# Patient Record
Sex: FEMALE | Race: WHITE | Employment: OTHER | URBAN - METROPOLITAN AREA
[De-identification: names, ages, dates, MRNs, and addresses within clinical notes are randomized per-mention and may not be internally consistent; named-entity substitution may affect disease eponyms.]

---

## 2018-04-25 ENCOUNTER — HOSPITAL ENCOUNTER (OUTPATIENT)
Dept: SURGERY | Age: 68
Discharge: HOME | End: 2018-04-25
Attending: SPECIALIST | Admitting: SPECIALIST

## 2018-04-25 VITALS
HEART RATE: 59 BPM | TEMPERATURE: 97.8 F | BODY MASS INDEX: 28.85 KG/M2 | DIASTOLIC BLOOD PRESSURE: 72 MMHG | OXYGEN SATURATION: 98 % | WEIGHT: 169 LBS | RESPIRATION RATE: 16 BRPM | SYSTOLIC BLOOD PRESSURE: 126 MMHG | HEIGHT: 64 IN

## 2018-04-25 RX ORDER — SODIUM CHLORIDE, SODIUM LACTATE, POTASSIUM CHLORIDE, CALCIUM CHLORIDE 600; 310; 30; 20 MG/100ML; MG/100ML; MG/100ML; MG/100ML
INJECTION, SOLUTION INTRAVENOUS CONTINUOUS
Status: DISCONTINUED | OUTPATIENT
Start: 2018-04-25 | End: 2018-04-26 | Stop reason: HOSPADM

## 2018-04-25 RX ADMIN — SODIUM CHLORIDE, SODIUM LACTATE, POTASSIUM CHLORIDE, CALCIUM CHLORIDE: 600; 310; 30; 20 INJECTION, SOLUTION INTRAVENOUS at 09:00

## 2018-04-25 ASSESSMENT — PAIN SCALES - GENERAL
PAINLEVEL_OUTOF10: 0
PAINLEVEL_OUTOF10: 0

## 2018-04-25 ASSESSMENT — PAIN - FUNCTIONAL ASSESSMENT: PAIN_FUNCTIONAL_ASSESSMENT: 0-10

## 2018-12-05 LAB
ALBUMIN SERPL-MCNC: 4.6 G/DL
ALP BLD-CCNC: 97 U/L
ALT SERPL-CCNC: 13 U/L
ANION GAP SERPL CALCULATED.3IONS-SCNC: NORMAL MMOL/L
AST SERPL-CCNC: 16 U/L
BILIRUB SERPL-MCNC: 0.4 MG/DL (ref 0.1–1.4)
BUN BLDV-MCNC: 16 MG/DL
CALCIUM SERPL-MCNC: 9.7 MG/DL
CHLORIDE BLD-SCNC: 104 MMOL/L
CHOLESTEROL, TOTAL: 176 MG/DL
CHOLESTEROL/HDL RATIO: 3.4
CO2: 31 MMOL/L
CREAT SERPL-MCNC: 1.1 MG/DL
GFR CALCULATED: NORMAL
GLUCOSE BLD-MCNC: 92 MG/DL
HDLC SERPL-MCNC: 52 MG/DL (ref 35–70)
LDL CHOLESTEROL CALCULATED: 84 MG/DL (ref 0–160)
POTASSIUM SERPL-SCNC: 4.4 MMOL/L
SODIUM BLD-SCNC: 144 MMOL/L
TOTAL PROTEIN: 6.8
TRIGL SERPL-MCNC: 202 MG/DL
VLDLC SERPL CALC-MCNC: 40 MG/DL

## 2019-05-10 DIAGNOSIS — F41.1 ANXIETY STATE: ICD-10-CM

## 2019-05-10 DIAGNOSIS — I10 BENIGN ESSENTIAL HTN: ICD-10-CM

## 2019-05-10 DIAGNOSIS — N63.0 BREAST LUMP: ICD-10-CM

## 2019-05-10 DIAGNOSIS — L71.9 ROSACEA: ICD-10-CM

## 2019-05-10 DIAGNOSIS — N18.2 CKD (CHRONIC KIDNEY DISEASE), STAGE II: ICD-10-CM

## 2019-06-04 ENCOUNTER — OFFICE VISIT (OUTPATIENT)
Dept: FAMILY MEDICINE CLINIC | Age: 69
End: 2019-06-04
Payer: MEDICARE

## 2019-06-04 VITALS
HEART RATE: 56 BPM | BODY MASS INDEX: 29.41 KG/M2 | WEIGHT: 166 LBS | SYSTOLIC BLOOD PRESSURE: 132 MMHG | DIASTOLIC BLOOD PRESSURE: 70 MMHG | HEIGHT: 63 IN

## 2019-06-04 DIAGNOSIS — I15.9 SECONDARY HYPERTENSION: Primary | ICD-10-CM

## 2019-06-04 DIAGNOSIS — E78.5 HYPERLIPIDEMIA, UNSPECIFIED HYPERLIPIDEMIA TYPE: ICD-10-CM

## 2019-06-04 DIAGNOSIS — I15.9 SECONDARY HYPERTENSION: ICD-10-CM

## 2019-06-04 DIAGNOSIS — K21.9 GASTROESOPHAGEAL REFLUX DISEASE WITHOUT ESOPHAGITIS: ICD-10-CM

## 2019-06-04 DIAGNOSIS — Z23 NEED FOR PROPHYLACTIC VACCINATION AGAINST STREPTOCOCCUS PNEUMONIAE (PNEUMOCOCCUS): ICD-10-CM

## 2019-06-04 DIAGNOSIS — M47.896 OTHER OSTEOARTHRITIS OF SPINE, LUMBAR REGION: ICD-10-CM

## 2019-06-04 PROBLEM — M47.899 OTHER OSTEOARTHRITIS OF SPINE: Status: ACTIVE | Noted: 2019-06-04

## 2019-06-04 LAB
A/G RATIO: 1.7 (ref 1.1–2.2)
ALBUMIN SERPL-MCNC: 4.5 G/DL (ref 3.4–5)
ALP BLD-CCNC: 113 U/L (ref 40–129)
ALT SERPL-CCNC: 15 U/L (ref 10–40)
ANION GAP SERPL CALCULATED.3IONS-SCNC: 14 MMOL/L (ref 3–16)
AST SERPL-CCNC: 21 U/L (ref 15–37)
BILIRUB SERPL-MCNC: 0.6 MG/DL (ref 0–1)
BUN BLDV-MCNC: 18 MG/DL (ref 7–20)
CALCIUM SERPL-MCNC: 9.5 MG/DL (ref 8.3–10.6)
CHLORIDE BLD-SCNC: 101 MMOL/L (ref 99–110)
CO2: 27 MMOL/L (ref 21–32)
CREAT SERPL-MCNC: 1.2 MG/DL (ref 0.6–1.2)
GFR AFRICAN AMERICAN: 54
GFR NON-AFRICAN AMERICAN: 44
GLOBULIN: 2.6 G/DL
GLUCOSE BLD-MCNC: 93 MG/DL (ref 70–99)
POTASSIUM SERPL-SCNC: 3.9 MMOL/L (ref 3.5–5.1)
SODIUM BLD-SCNC: 142 MMOL/L (ref 136–145)
TOTAL PROTEIN: 7.1 G/DL (ref 6.4–8.2)

## 2019-06-04 PROCEDURE — 99214 OFFICE O/P EST MOD 30 MIN: CPT | Performed by: FAMILY MEDICINE

## 2019-06-04 PROCEDURE — G0009 ADMIN PNEUMOCOCCAL VACCINE: HCPCS | Performed by: FAMILY MEDICINE

## 2019-06-04 PROCEDURE — 90732 PPSV23 VACC 2 YRS+ SUBQ/IM: CPT | Performed by: FAMILY MEDICINE

## 2019-06-04 RX ORDER — NABUMETONE 500 MG/1
500 TABLET, FILM COATED ORAL 2 TIMES DAILY
Qty: 180 TABLET | Refills: 1 | Status: SHIPPED | OUTPATIENT
Start: 2019-06-04 | End: 2019-12-04 | Stop reason: SDUPTHER

## 2019-06-04 RX ORDER — TRIAMTERENE AND HYDROCHLOROTHIAZIDE 37.5; 25 MG/1; MG/1
0.5 TABLET ORAL DAILY
Qty: 90 TABLET | Refills: 0 | Status: SHIPPED | OUTPATIENT
Start: 2019-06-04 | End: 2019-12-04 | Stop reason: SDUPTHER

## 2019-06-04 RX ORDER — AMLODIPINE BESYLATE 2.5 MG/1
2.5 TABLET ORAL DAILY
Qty: 90 TABLET | Refills: 1 | Status: SHIPPED | OUTPATIENT
Start: 2019-06-04 | End: 2019-12-04 | Stop reason: SDUPTHER

## 2019-06-04 RX ORDER — ATORVASTATIN CALCIUM 20 MG/1
20 TABLET, FILM COATED ORAL DAILY
Qty: 90 TABLET | Refills: 1 | Status: SHIPPED | OUTPATIENT
Start: 2019-06-04 | End: 2019-12-04 | Stop reason: SDUPTHER

## 2019-06-04 RX ORDER — ESOMEPRAZOLE MAGNESIUM 40 MG/1
40 FOR SUSPENSION ORAL DAILY
Qty: 90 PACKET | Refills: 1 | Status: SHIPPED | OUTPATIENT
Start: 2019-06-04 | End: 2019-06-11

## 2019-06-04 ASSESSMENT — ENCOUNTER SYMPTOMS
VOMITING: 0
EYE PAIN: 0
NAUSEA: 0
TROUBLE SWALLOWING: 0
ABDOMINAL PAIN: 0
CHEST TIGHTNESS: 0
BLOOD IN STOOL: 0
DIARRHEA: 0
WHEEZING: 0
SHORTNESS OF BREATH: 0

## 2019-06-04 ASSESSMENT — PATIENT HEALTH QUESTIONNAIRE - PHQ9
1. LITTLE INTEREST OR PLEASURE IN DOING THINGS: 0
SUM OF ALL RESPONSES TO PHQ QUESTIONS 1-9: 0
2. FEELING DOWN, DEPRESSED OR HOPELESS: 0
SUM OF ALL RESPONSES TO PHQ QUESTIONS 1-9: 0
SUM OF ALL RESPONSES TO PHQ9 QUESTIONS 1 & 2: 0

## 2019-06-04 NOTE — PROGRESS NOTES
6/4/2019    Isaiah Da Silva    Chief Complaint   Patient presents with    Hypertension    Hyperlipidemia    Gastroesophageal Reflux    6 Month Follow-Up     ROV, MED REFILLS, NO C/O       HPI  History was obtained from the patient. Justo Lowry is a 70 y.o. female who presents today with routine follow-up. She does continue to have mild to moderate osteoarthritis pain. Her GE reflux is well-controlled with treatment and I suggest she may try going to every other day on her PPI therapy. Meds. We will tolerated she remains active denies other issues or changes. She still takes care of her mother on a daily basis. Jacqueline Snider REVIEW OF SYMPTOMS    Review of Systems   Constitutional: Negative for activity change and fatigue. HENT: Negative for congestion, hearing loss, mouth sores and trouble swallowing. Eyes: Negative for pain and visual disturbance. Respiratory: Negative for chest tightness, shortness of breath and wheezing. Cardiovascular: Negative for chest pain and palpitations. Gastrointestinal: Negative for abdominal pain, blood in stool, diarrhea, nausea and vomiting. Genitourinary: Negative for dysuria, frequency and urgency. Musculoskeletal: Negative for arthralgias, gait problem and neck stiffness. Skin: Negative for rash. Allergic/Immunologic: Negative for environmental allergies. Neurological: Negative for dizziness, seizures, speech difficulty and weakness. Hematological: Does not bruise/bleed easily. Psychiatric/Behavioral: Negative for agitation, confusion and hallucinations.        PAST MEDICAL HISTORY  Past Medical History:   Diagnosis Date    Anxiety state     Arthritis     Benign essential HTN     Breast lump     Bunion of great toe of right foot     CKD (chronic kidney disease), stage II     Colon polyp 10-    GERD (gastroesophageal reflux disease)     Hyperlipidemia     Internal hemorrhoids 10-    Irritable bowel syndrome     Nausea & vomiting     Osteoarthritis     Rosacea        FAMILY HISTORY  Family History   Problem Relation Age of Onset    Other Son 22        CROHNS       SOCIAL HISTORY  Social History     Socioeconomic History    Marital status:      Spouse name: None    Number of children: None    Years of education: None    Highest education level: None   Occupational History    None   Social Needs    Financial resource strain: None    Food insecurity:     Worry: None     Inability: None    Transportation needs:     Medical: None     Non-medical: None   Tobacco Use    Smoking status: Never Smoker    Smokeless tobacco: Never Used   Substance and Sexual Activity    Alcohol use: No    Drug use: No    Sexual activity: None   Lifestyle    Physical activity:     Days per week: None     Minutes per session: None    Stress: None   Relationships    Social connections:     Talks on phone: None     Gets together: None     Attends Pentecostal service: None     Active member of club or organization: None     Attends meetings of clubs or organizations: None     Relationship status: None    Intimate partner violence:     Fear of current or ex partner: None     Emotionally abused: None     Physically abused: None     Forced sexual activity: None   Other Topics Concern    None   Social History Narrative    None        SURGICAL HISTORY  Past Surgical History:   Procedure Laterality Date    APPENDECTOMY  1963    BREAST SURGERY  2002    BIOPSY    BUNIONECTOMY Left    [address]    COLONOSCOPY  04/25/2018    FOOT SURGERY      Multiple Foot surgeries including pin in ankle and Heel Spurs    FOOT TENDON SURGERY      TUBAL LIGATION  1982    TUMOR REMOVAL      NECK X2                 CURRENT MEDICATIONS  Current Outpatient Medications   Medication Sig Dispense Refill    triamterene-hydrochlorothiazide (MAXZIDE-25) 37.5-25 MG per tablet Take 0.5 tablets by mouth daily 1/2 TABLET PER DAY 90 tablet 0    nabumetone (RELAFEN) 500 MG tablet Take 1 tablet by mouth 2 times daily 180 tablet 1    esomeprazole Magnesium (NEXIUM) 40 MG PACK Take 1 packet by mouth daily 90 packet 1    atorvastatin (LIPITOR) 20 MG tablet Take 1 tablet by mouth daily 90 tablet 1    amLODIPine (NORVASC) 2.5 MG tablet Take 1 tablet by mouth daily 90 tablet 1    Omega-3 Fatty Acids (FISH OIL) 1000 MG CAPS Take 3,000 mg by mouth 3 times daily      Calcium Carbonate-Vitamin D (CALCIUM 600+D HIGH POTENCY PO) Take by mouth      Multiple Vitamins-Minerals (HAIR SKIN AND NAILS FORMULA PO) Take by mouth      simethicone (MYLICON) 80 MG chewable tablet Take 80 mg by mouth every 6 hours as needed for Flatulence      Lactase (LACTAID PO) Take by mouth      hyoscyamine (LEVSIN/SL) 125 MCG sublingual tablet Place 1 tablet under the tongue 3 times daily (before meals) 270 tablet 3     No current facility-administered medications for this visit. ALLERGIES  No Known Allergies    PHYSICAL EXAM    /70 (Site: Right Upper Arm)   Pulse 56   Ht 5' 3\" (1.6 m)   Wt 166 lb (75.3 kg)   BMI 29.41 kg/m²     Physical Exam   Constitutional: She is oriented to person, place, and time. She appears well-developed and well-nourished. HENT:   Head: Normocephalic and atraumatic. Eyes: Pupils are equal, round, and reactive to light. EOM are normal.   Neck: Normal range of motion. Neck supple. Cardiovascular: Normal rate, regular rhythm and normal heart sounds. Pulmonary/Chest: Effort normal and breath sounds normal.   Abdominal: Soft. Musculoskeletal: Normal range of motion. Neurological: She is alert and oriented to person, place, and time. Skin: Skin is warm and dry. Psychiatric: She has a normal mood and affect. Thought content normal.   Nursing note and vitals reviewed. ASSESSMENT & PLAN     Diagnosis Orders   1. Secondary hypertension  Comprehensive Metabolic Panel   2.  Need for prophylactic vaccination against Streptococcus pneumoniae (pneumococcus)  Pneumococcal polysaccharide vaccine 23-valent PPSV23   3. Hyperlipidemia, unspecified hyperlipidemia type     4. Gastroesophageal reflux disease without esophagitis     5. Other osteoarthritis of spine, lumbar region     Continue with healthy lifestyle and current medications refill to be provided as needed we'll check a CMP today she is due for Pneumovax 23 which was given. Call if other problems arise    Return in about 6 months (around 12/4/2019).          Electronically signed by Vinay Vaughn MD on 6/4/2019

## 2019-06-11 ENCOUNTER — TELEPHONE (OUTPATIENT)
Dept: FAMILY MEDICINE CLINIC | Age: 69
End: 2019-06-11

## 2019-06-11 RX ORDER — ESOMEPRAZOLE MAGNESIUM 40 MG/1
40 CAPSULE, DELAYED RELEASE ORAL
Qty: 90 CAPSULE | Refills: 1 | Status: SHIPPED | OUTPATIENT
Start: 2019-06-11 | End: 2019-12-04 | Stop reason: SDUPTHER

## 2019-06-11 RX ORDER — ESOMEPRAZOLE MAGNESIUM 40 MG/1
40 CAPSULE, DELAYED RELEASE ORAL
COMMUNITY
End: 2019-06-11 | Stop reason: SDUPTHER

## 2019-06-11 NOTE — TELEPHONE ENCOUNTER
----- Message from Litzy Ortiz MA sent at 6/11/2019 10:19 AM EDT -----  Contact: PATIENT      ----- Message -----  From: Librado Brady  Sent: 6/11/2019   9:56 AM  To: Litzy Ortiz MA    JONATHAN IS IN A PACKAGE. SHE USUALLY GETS A CAP.  WANTS TO TALK TO NURSE.   [phone]

## 2019-08-26 ENCOUNTER — OFFICE VISIT (OUTPATIENT)
Dept: FAMILY MEDICINE CLINIC | Age: 69
End: 2019-08-26
Payer: MEDICARE

## 2019-08-26 VITALS
HEART RATE: 68 BPM | WEIGHT: 164.69 LBS | DIASTOLIC BLOOD PRESSURE: 62 MMHG | BODY MASS INDEX: 29.18 KG/M2 | HEIGHT: 63 IN | OXYGEN SATURATION: 96 % | SYSTOLIC BLOOD PRESSURE: 136 MMHG

## 2019-08-26 DIAGNOSIS — M10.9 ACUTE GOUT, UNSPECIFIED CAUSE, UNSPECIFIED SITE: Primary | ICD-10-CM

## 2019-08-26 PROCEDURE — 4040F PNEUMOC VAC/ADMIN/RCVD: CPT | Performed by: NURSE PRACTITIONER

## 2019-08-26 PROCEDURE — G8400 PT W/DXA NO RESULTS DOC: HCPCS | Performed by: NURSE PRACTITIONER

## 2019-08-26 PROCEDURE — 99213 OFFICE O/P EST LOW 20 MIN: CPT | Performed by: NURSE PRACTITIONER

## 2019-08-26 PROCEDURE — 1036F TOBACCO NON-USER: CPT | Performed by: NURSE PRACTITIONER

## 2019-08-26 PROCEDURE — 1090F PRES/ABSN URINE INCON ASSESS: CPT | Performed by: NURSE PRACTITIONER

## 2019-08-26 PROCEDURE — 1123F ACP DISCUSS/DSCN MKR DOCD: CPT | Performed by: NURSE PRACTITIONER

## 2019-08-26 PROCEDURE — 3017F COLORECTAL CA SCREEN DOC REV: CPT | Performed by: NURSE PRACTITIONER

## 2019-08-26 PROCEDURE — G8427 DOCREV CUR MEDS BY ELIG CLIN: HCPCS | Performed by: NURSE PRACTITIONER

## 2019-08-26 PROCEDURE — G8419 CALC BMI OUT NRM PARAM NOF/U: HCPCS | Performed by: NURSE PRACTITIONER

## 2019-08-26 RX ORDER — PREDNISONE 20 MG/1
40 TABLET ORAL DAILY
Qty: 10 TABLET | Refills: 0 | Status: SHIPPED | OUTPATIENT
Start: 2019-08-26 | End: 2019-08-31

## 2019-08-26 ASSESSMENT — ENCOUNTER SYMPTOMS
DIARRHEA: 0
VOMITING: 0
NAUSEA: 0
RESPIRATORY NEGATIVE: 1

## 2019-08-26 NOTE — PROGRESS NOTES
Results   Component Value Date     06/04/2019    K 3.9 06/04/2019     06/04/2019    CO2 27 06/04/2019    BUN 18 06/04/2019    CREATININE 1.2 06/04/2019    GLUCOSE 93 06/04/2019    CALCIUM 9.5 06/04/2019    PROT 7.1 06/04/2019    LABALBU 4.5 06/04/2019    BILITOT 0.6 06/04/2019    ALKPHOS 113 06/04/2019    AST 21 06/04/2019    ALT 15 06/04/2019    LABGLOM 44 (A) 06/04/2019    GFRAA 54 (A) 06/04/2019    AGRATIO 1.7 06/04/2019    GLOB 2.6 06/04/2019     Lab Results   Component Value Date    CHOL 176 12/05/2018     Lab Results   Component Value Date    TRIG 202 12/05/2018     Lab Results   Component Value Date    HDL 52 12/05/2018     Lab Results   Component Value Date    LDLCALC 84 12/05/2018     No results found for: LABA1C  No results found for: TSHFT4, TSH, TSHHS      ASSESSMENT/PLAN:      1. Acute gout, unspecified cause, unspecified site  Gout of DIP joint of left index finger. Advised patient if no improvement in the next 24 to 48 hours to call or follow up. - predniSONE (DELTASONE) 20 MG tablet; Take 2 tablets by mouth daily for 5 days  Dispense: 10 tablet; Refill: 0          There are no discontinued medications. No orders of the defined types were placed in this encounter. Care discussed with patient. Questions answered. Patient verbalizes understanding and agrees with plan. After visit summary provided. Advised to call for any problems, questions, or concerns. Return if symptoms worsen or fail to improve.                                              Signed:  KENYATTA Page CNP  08/26/19  4:05 PM

## 2019-10-22 ENCOUNTER — TELEPHONE (OUTPATIENT)
Dept: FAMILY MEDICINE CLINIC | Age: 69
End: 2019-10-22

## 2019-10-23 ENCOUNTER — TELEPHONE (OUTPATIENT)
Dept: FAMILY MEDICINE CLINIC | Age: 69
End: 2019-10-23

## 2019-12-04 ENCOUNTER — OFFICE VISIT (OUTPATIENT)
Dept: FAMILY MEDICINE CLINIC | Age: 69
End: 2019-12-04
Payer: MEDICARE

## 2019-12-04 VITALS
WEIGHT: 164 LBS | HEIGHT: 63 IN | SYSTOLIC BLOOD PRESSURE: 128 MMHG | BODY MASS INDEX: 29.06 KG/M2 | DIASTOLIC BLOOD PRESSURE: 74 MMHG | HEART RATE: 64 BPM

## 2019-12-04 DIAGNOSIS — I10 BENIGN ESSENTIAL HTN: ICD-10-CM

## 2019-12-04 DIAGNOSIS — I10 BENIGN ESSENTIAL HTN: Primary | ICD-10-CM

## 2019-12-04 DIAGNOSIS — E78.5 HYPERLIPIDEMIA, UNSPECIFIED HYPERLIPIDEMIA TYPE: ICD-10-CM

## 2019-12-04 DIAGNOSIS — K21.9 GASTROESOPHAGEAL REFLUX DISEASE WITHOUT ESOPHAGITIS: ICD-10-CM

## 2019-12-04 DIAGNOSIS — R30.0 DYSURIA: ICD-10-CM

## 2019-12-04 DIAGNOSIS — Z00.00 ROUTINE GENERAL MEDICAL EXAMINATION AT A HEALTH CARE FACILITY: ICD-10-CM

## 2019-12-04 LAB
A/G RATIO: 2 (ref 1.1–2.2)
ALBUMIN SERPL-MCNC: 4.4 G/DL (ref 3.4–5)
ALP BLD-CCNC: 105 U/L (ref 40–129)
ALT SERPL-CCNC: 15 U/L (ref 10–40)
ANION GAP SERPL CALCULATED.3IONS-SCNC: 14 MMOL/L (ref 3–16)
AST SERPL-CCNC: 21 U/L (ref 15–37)
BILIRUB SERPL-MCNC: 0.6 MG/DL (ref 0–1)
BUN BLDV-MCNC: 22 MG/DL (ref 7–20)
CALCIUM SERPL-MCNC: 9.4 MG/DL (ref 8.3–10.6)
CHLORIDE BLD-SCNC: 103 MMOL/L (ref 99–110)
CHOLESTEROL, TOTAL: 158 MG/DL (ref 0–199)
CO2: 25 MMOL/L (ref 21–32)
CREAT SERPL-MCNC: 1 MG/DL (ref 0.6–1.2)
EPITHELIAL CELLS, UA: 0 /HPF (ref 0–5)
GFR AFRICAN AMERICAN: >60
GFR NON-AFRICAN AMERICAN: 55
GLOBULIN: 2.2 G/DL
GLUCOSE BLD-MCNC: 89 MG/DL (ref 70–99)
HCT VFR BLD CALC: 37.4 % (ref 36–48)
HDLC SERPL-MCNC: 59 MG/DL (ref 40–60)
HEMOGLOBIN: 12.6 G/DL (ref 12–16)
HYALINE CASTS: 0 /LPF (ref 0–8)
LDL CHOLESTEROL CALCULATED: 68 MG/DL
MCH RBC QN AUTO: 31.4 PG (ref 26–34)
MCHC RBC AUTO-ENTMCNC: 33.7 G/DL (ref 31–36)
MCV RBC AUTO: 93.4 FL (ref 80–100)
PDW BLD-RTO: 13.9 % (ref 12.4–15.4)
PLATELET # BLD: 188 K/UL (ref 135–450)
PMV BLD AUTO: 9.1 FL (ref 5–10.5)
POTASSIUM SERPL-SCNC: 4.1 MMOL/L (ref 3.5–5.1)
RBC # BLD: 4.01 M/UL (ref 4–5.2)
RBC UA: 2 /HPF (ref 0–4)
SODIUM BLD-SCNC: 142 MMOL/L (ref 136–145)
TOTAL PROTEIN: 6.6 G/DL (ref 6.4–8.2)
TRIGL SERPL-MCNC: 156 MG/DL (ref 0–150)
TSH REFLEX: 2.82 UIU/ML (ref 0.27–4.2)
URINE TYPE: NORMAL
VLDLC SERPL CALC-MCNC: 31 MG/DL
WBC # BLD: 6.7 K/UL (ref 4–11)
WBC UA: 0 /HPF (ref 0–5)

## 2019-12-04 PROCEDURE — G0438 PPPS, INITIAL VISIT: HCPCS | Performed by: FAMILY MEDICINE

## 2019-12-04 RX ORDER — ESOMEPRAZOLE MAGNESIUM 40 MG/1
40 CAPSULE, DELAYED RELEASE ORAL
Qty: 90 CAPSULE | Refills: 1 | Status: ON HOLD | OUTPATIENT
Start: 2019-12-04 | End: 2020-07-01 | Stop reason: HOSPADM

## 2019-12-04 RX ORDER — NABUMETONE 500 MG/1
500 TABLET, FILM COATED ORAL 2 TIMES DAILY
Qty: 180 TABLET | Refills: 1 | Status: SHIPPED | OUTPATIENT
Start: 2019-12-04 | End: 2020-06-26 | Stop reason: SDUPTHER

## 2019-12-04 RX ORDER — ATORVASTATIN CALCIUM 20 MG/1
20 TABLET, FILM COATED ORAL DAILY
Qty: 90 TABLET | Refills: 1 | Status: SHIPPED | OUTPATIENT
Start: 2019-12-04 | End: 2020-06-26 | Stop reason: SDUPTHER

## 2019-12-04 RX ORDER — AMLODIPINE BESYLATE 2.5 MG/1
2.5 TABLET ORAL DAILY
Qty: 90 TABLET | Refills: 1 | Status: SHIPPED | OUTPATIENT
Start: 2019-12-04 | End: 2020-06-26 | Stop reason: SDUPTHER

## 2019-12-04 RX ORDER — SODIUM FLUORIDE 1.1 G/100G
1 GEL, DENTIFRICE ORAL NIGHTLY
Refills: 3 | COMMUNITY
Start: 2019-10-14

## 2019-12-04 RX ORDER — TRIAMTERENE AND HYDROCHLOROTHIAZIDE 37.5; 25 MG/1; MG/1
0.5 TABLET ORAL DAILY
Qty: 45 TABLET | Refills: 1 | Status: SHIPPED | OUTPATIENT
Start: 2019-12-04 | End: 2020-06-26 | Stop reason: SDUPTHER

## 2019-12-04 ASSESSMENT — PATIENT HEALTH QUESTIONNAIRE - PHQ9
SUM OF ALL RESPONSES TO PHQ QUESTIONS 1-9: 0
SUM OF ALL RESPONSES TO PHQ QUESTIONS 1-9: 0

## 2019-12-04 ASSESSMENT — LIFESTYLE VARIABLES
HAS A RELATIVE, FRIEND, DOCTOR, OR ANOTHER HEALTH PROFESSIONAL EXPRESSED CONCERN ABOUT YOUR DRINKING OR SUGGESTED YOU CUT DOWN: 0
HAVE YOU OR SOMEONE ELSE BEEN INJURED AS A RESULT OF YOUR DRINKING: 0
HOW OFTEN DURING THE LAST YEAR HAVE YOU FOUND THAT YOU WERE NOT ABLE TO STOP DRINKING ONCE YOU HAD STARTED: 0
HOW MANY STANDARD DRINKS CONTAINING ALCOHOL DO YOU HAVE ON A TYPICAL DAY: 0
HOW OFTEN DURING THE LAST YEAR HAVE YOU FAILED TO DO WHAT WAS NORMALLY EXPECTED FROM YOU BECAUSE OF DRINKING: 0
HOW OFTEN DURING THE LAST YEAR HAVE YOU NEEDED AN ALCOHOLIC DRINK FIRST THING IN THE MORNING TO GET YOURSELF GOING AFTER A NIGHT OF HEAVY DRINKING: 0
AUDIT-C TOTAL SCORE: 1
HOW OFTEN DO YOU HAVE SIX OR MORE DRINKS ON ONE OCCASION: 0
HOW OFTEN DO YOU HAVE A DRINK CONTAINING ALCOHOL: 1
HOW OFTEN DURING THE LAST YEAR HAVE YOU BEEN UNABLE TO REMEMBER WHAT HAPPENED THE NIGHT BEFORE BECAUSE YOU HAD BEEN DRINKING: 0
HOW OFTEN DURING THE LAST YEAR HAVE YOU HAD A FEELING OF GUILT OR REMORSE AFTER DRINKING: 0
AUDIT TOTAL SCORE: 1

## 2020-06-26 ENCOUNTER — VIRTUAL VISIT (OUTPATIENT)
Dept: FAMILY MEDICINE CLINIC | Age: 70
End: 2020-06-26
Payer: MEDICARE

## 2020-06-26 PROCEDURE — 1123F ACP DISCUSS/DSCN MKR DOCD: CPT | Performed by: FAMILY MEDICINE

## 2020-06-26 PROCEDURE — 3017F COLORECTAL CA SCREEN DOC REV: CPT | Performed by: FAMILY MEDICINE

## 2020-06-26 PROCEDURE — G8427 DOCREV CUR MEDS BY ELIG CLIN: HCPCS | Performed by: FAMILY MEDICINE

## 2020-06-26 PROCEDURE — 1036F TOBACCO NON-USER: CPT | Performed by: FAMILY MEDICINE

## 2020-06-26 PROCEDURE — 1090F PRES/ABSN URINE INCON ASSESS: CPT | Performed by: FAMILY MEDICINE

## 2020-06-26 PROCEDURE — 4040F PNEUMOC VAC/ADMIN/RCVD: CPT | Performed by: FAMILY MEDICINE

## 2020-06-26 PROCEDURE — 99213 OFFICE O/P EST LOW 20 MIN: CPT | Performed by: FAMILY MEDICINE

## 2020-06-26 PROCEDURE — G8417 CALC BMI ABV UP PARAM F/U: HCPCS | Performed by: FAMILY MEDICINE

## 2020-06-26 PROCEDURE — G8400 PT W/DXA NO RESULTS DOC: HCPCS | Performed by: FAMILY MEDICINE

## 2020-06-26 RX ORDER — ESOMEPRAZOLE MAGNESIUM 40 MG/1
40 CAPSULE, DELAYED RELEASE ORAL
Qty: 90 CAPSULE | Refills: 1 | Status: CANCELLED | OUTPATIENT
Start: 2020-06-26

## 2020-06-26 RX ORDER — AMLODIPINE BESYLATE 2.5 MG/1
2.5 TABLET ORAL DAILY
Qty: 90 TABLET | Refills: 1 | Status: ON HOLD | OUTPATIENT
Start: 2020-06-26 | End: 2020-07-01 | Stop reason: HOSPADM

## 2020-06-26 RX ORDER — TRIAMTERENE AND HYDROCHLOROTHIAZIDE 37.5; 25 MG/1; MG/1
0.5 TABLET ORAL DAILY
Qty: 45 TABLET | Refills: 1 | Status: SHIPPED | OUTPATIENT
Start: 2020-06-26 | End: 2021-01-05 | Stop reason: SDUPTHER

## 2020-06-26 RX ORDER — ATORVASTATIN CALCIUM 20 MG/1
20 TABLET, FILM COATED ORAL DAILY
Qty: 90 TABLET | Refills: 1 | Status: SHIPPED | OUTPATIENT
Start: 2020-06-26 | End: 2021-01-05 | Stop reason: SDUPTHER

## 2020-06-26 RX ORDER — NABUMETONE 500 MG/1
500 TABLET, FILM COATED ORAL 2 TIMES DAILY
Qty: 180 TABLET | Refills: 1 | Status: SHIPPED | OUTPATIENT
Start: 2020-06-26 | End: 2021-01-05 | Stop reason: SDUPTHER

## 2020-06-26 ASSESSMENT — PATIENT HEALTH QUESTIONNAIRE - PHQ9
SUM OF ALL RESPONSES TO PHQ QUESTIONS 1-9: 0
1. LITTLE INTEREST OR PLEASURE IN DOING THINGS: 0
SUM OF ALL RESPONSES TO PHQ QUESTIONS 1-9: 0
2. FEELING DOWN, DEPRESSED OR HOPELESS: 0
SUM OF ALL RESPONSES TO PHQ9 QUESTIONS 1 & 2: 0

## 2020-06-30 ENCOUNTER — HOSPITAL ENCOUNTER (OUTPATIENT)
Age: 70
Setting detail: OBSERVATION
Discharge: HOME | End: 2020-07-01
Attending: EMERGENCY MEDICINE | Admitting: INTERNAL MEDICINE
Payer: MEDICARE

## 2020-06-30 ENCOUNTER — APPOINTMENT (OUTPATIENT)
Dept: GENERAL RADIOLOGY | Age: 70
End: 2020-06-30
Payer: MEDICARE

## 2020-06-30 ENCOUNTER — APPOINTMENT (OUTPATIENT)
Dept: CT IMAGING | Age: 70
End: 2020-06-30
Payer: MEDICARE

## 2020-06-30 PROBLEM — R07.9 CHEST PAIN: Status: ACTIVE | Noted: 2020-06-30

## 2020-06-30 PROBLEM — R07.2 PRECORDIAL CHEST PAIN: Status: ACTIVE | Noted: 2020-06-30

## 2020-06-30 LAB
ALBUMIN SERPL-MCNC: 3.8 GM/DL (ref 3.4–5)
ALP BLD-CCNC: 95 IU/L (ref 40–129)
ALT SERPL-CCNC: 14 U/L (ref 10–40)
ANION GAP SERPL CALCULATED.3IONS-SCNC: 15 MMOL/L (ref 4–16)
AST SERPL-CCNC: 20 IU/L (ref 15–37)
BASOPHILS ABSOLUTE: 0 K/CU MM
BASOPHILS RELATIVE PERCENT: 0.3 % (ref 0–1)
BILIRUB SERPL-MCNC: 0.5 MG/DL (ref 0–1)
BUN BLDV-MCNC: 13 MG/DL (ref 6–23)
CALCIUM SERPL-MCNC: 9.3 MG/DL (ref 8.3–10.6)
CHLORIDE BLD-SCNC: 105 MMOL/L (ref 99–110)
CO2: 22 MMOL/L (ref 21–32)
CREAT SERPL-MCNC: 1.1 MG/DL (ref 0.6–1.1)
DIFFERENTIAL TYPE: ABNORMAL
EKG ATRIAL RATE: 64 BPM
EKG DIAGNOSIS: NORMAL
EKG P AXIS: 50 DEGREES
EKG P-R INTERVAL: 154 MS
EKG Q-T INTERVAL: 390 MS
EKG QRS DURATION: 84 MS
EKG QTC CALCULATION (BAZETT): 402 MS
EKG R AXIS: -29 DEGREES
EKG T AXIS: 10 DEGREES
EKG VENTRICULAR RATE: 64 BPM
EOSINOPHILS ABSOLUTE: 0.1 K/CU MM
EOSINOPHILS RELATIVE PERCENT: 1.2 % (ref 0–3)
GFR AFRICAN AMERICAN: 59 ML/MIN/1.73M2
GFR NON-AFRICAN AMERICAN: 49 ML/MIN/1.73M2
GLUCOSE BLD-MCNC: 106 MG/DL (ref 70–99)
HCT VFR BLD CALC: 36.7 % (ref 37–47)
HEMOGLOBIN: 11.9 GM/DL (ref 12.5–16)
IMMATURE NEUTROPHIL %: 0.3 % (ref 0–0.43)
LYMPHOCYTES ABSOLUTE: 1.5 K/CU MM
LYMPHOCYTES RELATIVE PERCENT: 22.1 % (ref 24–44)
MCH RBC QN AUTO: 30.5 PG (ref 27–31)
MCHC RBC AUTO-ENTMCNC: 32.4 % (ref 32–36)
MCV RBC AUTO: 94.1 FL (ref 78–100)
MONOCYTES ABSOLUTE: 0.5 K/CU MM
MONOCYTES RELATIVE PERCENT: 6.9 % (ref 0–4)
PDW BLD-RTO: 12.8 % (ref 11.7–14.9)
PLATELET # BLD: 186 K/CU MM (ref 140–440)
PMV BLD AUTO: 10.4 FL (ref 7.5–11.1)
POTASSIUM SERPL-SCNC: 3.9 MMOL/L (ref 3.5–5.1)
RBC # BLD: 3.9 M/CU MM (ref 4.2–5.4)
SEGMENTED NEUTROPHILS ABSOLUTE COUNT: 4.6 K/CU MM
SEGMENTED NEUTROPHILS RELATIVE PERCENT: 69.2 % (ref 36–66)
SODIUM BLD-SCNC: 142 MMOL/L (ref 135–145)
TOTAL IMMATURE NEUTOROPHIL: 0.02 K/CU MM
TOTAL PROTEIN: 6.5 GM/DL (ref 6.4–8.2)
TROPONIN T: <0.01 NG/ML
WBC # BLD: 6.6 K/CU MM (ref 4–10.5)

## 2020-06-30 PROCEDURE — 80053 COMPREHEN METABOLIC PANEL: CPT

## 2020-06-30 PROCEDURE — 6370000000 HC RX 637 (ALT 250 FOR IP): Performed by: EMERGENCY MEDICINE

## 2020-06-30 PROCEDURE — 85025 COMPLETE CBC W/AUTO DIFF WBC: CPT

## 2020-06-30 PROCEDURE — 36415 COLL VENOUS BLD VENIPUNCTURE: CPT

## 2020-06-30 PROCEDURE — 84484 ASSAY OF TROPONIN QUANT: CPT

## 2020-06-30 PROCEDURE — 93010 ELECTROCARDIOGRAM REPORT: CPT | Performed by: INTERNAL MEDICINE

## 2020-06-30 PROCEDURE — 71275 CT ANGIOGRAPHY CHEST: CPT

## 2020-06-30 PROCEDURE — 99204 OFFICE O/P NEW MOD 45 MIN: CPT | Performed by: INTERNAL MEDICINE

## 2020-06-30 PROCEDURE — G0378 HOSPITAL OBSERVATION PER HR: HCPCS

## 2020-06-30 PROCEDURE — 6360000004 HC RX CONTRAST MEDICATION: Performed by: EMERGENCY MEDICINE

## 2020-06-30 PROCEDURE — 99285 EMERGENCY DEPT VISIT HI MDM: CPT

## 2020-06-30 PROCEDURE — 71045 X-RAY EXAM CHEST 1 VIEW: CPT

## 2020-06-30 PROCEDURE — 93005 ELECTROCARDIOGRAM TRACING: CPT | Performed by: EMERGENCY MEDICINE

## 2020-06-30 PROCEDURE — 6370000000 HC RX 637 (ALT 250 FOR IP): Performed by: INTERNAL MEDICINE

## 2020-06-30 PROCEDURE — 2580000003 HC RX 258: Performed by: INTERNAL MEDICINE

## 2020-06-30 RX ORDER — SODIUM CHLORIDE 0.9 % (FLUSH) 0.9 %
10 SYRINGE (ML) INJECTION PRN
Status: DISCONTINUED | OUTPATIENT
Start: 2020-06-30 | End: 2020-07-01 | Stop reason: HOSPADM

## 2020-06-30 RX ORDER — TRIAMTERENE AND HYDROCHLOROTHIAZIDE 37.5; 25 MG/1; MG/1
0.5 TABLET ORAL DAILY
Status: DISCONTINUED | OUTPATIENT
Start: 2020-06-30 | End: 2020-07-01 | Stop reason: HOSPADM

## 2020-06-30 RX ORDER — SODIUM CHLORIDE 0.9 % (FLUSH) 0.9 %
10 SYRINGE (ML) INJECTION EVERY 12 HOURS SCHEDULED
Status: DISCONTINUED | OUTPATIENT
Start: 2020-06-30 | End: 2020-07-01 | Stop reason: HOSPADM

## 2020-06-30 RX ORDER — ONDANSETRON 2 MG/ML
4 INJECTION INTRAMUSCULAR; INTRAVENOUS EVERY 6 HOURS PRN
Status: DISCONTINUED | OUTPATIENT
Start: 2020-06-30 | End: 2020-07-01 | Stop reason: HOSPADM

## 2020-06-30 RX ORDER — ACETAMINOPHEN 650 MG/1
650 SUPPOSITORY RECTAL EVERY 6 HOURS PRN
Status: DISCONTINUED | OUTPATIENT
Start: 2020-06-30 | End: 2020-07-01 | Stop reason: HOSPADM

## 2020-06-30 RX ORDER — ASPIRIN 81 MG/1
81 TABLET ORAL DAILY
Status: DISCONTINUED | OUTPATIENT
Start: 2020-06-30 | End: 2020-07-01 | Stop reason: HOSPADM

## 2020-06-30 RX ORDER — ACETAMINOPHEN 325 MG/1
650 TABLET ORAL EVERY 6 HOURS PRN
Status: DISCONTINUED | OUTPATIENT
Start: 2020-06-30 | End: 2020-07-01 | Stop reason: HOSPADM

## 2020-06-30 RX ORDER — POLYETHYLENE GLYCOL 3350 17 G/17G
17 POWDER, FOR SOLUTION ORAL DAILY PRN
Status: DISCONTINUED | OUTPATIENT
Start: 2020-06-30 | End: 2020-07-01 | Stop reason: HOSPADM

## 2020-06-30 RX ORDER — PANTOPRAZOLE SODIUM 40 MG/1
40 TABLET, DELAYED RELEASE ORAL
Status: DISCONTINUED | OUTPATIENT
Start: 2020-07-01 | End: 2020-07-01 | Stop reason: HOSPADM

## 2020-06-30 RX ORDER — SIMETHICONE 80 MG
80 TABLET,CHEWABLE ORAL EVERY 6 HOURS PRN
Status: DISCONTINUED | OUTPATIENT
Start: 2020-06-30 | End: 2020-07-01 | Stop reason: HOSPADM

## 2020-06-30 RX ORDER — ATORVASTATIN CALCIUM 20 MG/1
20 TABLET, FILM COATED ORAL NIGHTLY
Status: DISCONTINUED | OUTPATIENT
Start: 2020-06-30 | End: 2020-07-01 | Stop reason: HOSPADM

## 2020-06-30 RX ORDER — PROMETHAZINE HYDROCHLORIDE 12.5 MG/1
12.5 TABLET ORAL EVERY 6 HOURS PRN
Status: DISCONTINUED | OUTPATIENT
Start: 2020-06-30 | End: 2020-07-01 | Stop reason: HOSPADM

## 2020-06-30 RX ORDER — NITROGLYCERIN 0.4 MG/1
0.4 TABLET SUBLINGUAL EVERY 5 MIN PRN
Status: DISCONTINUED | OUTPATIENT
Start: 2020-06-30 | End: 2020-07-01 | Stop reason: HOSPADM

## 2020-06-30 RX ORDER — ASPIRIN 81 MG/1
81 TABLET, CHEWABLE ORAL DAILY
Status: DISCONTINUED | OUTPATIENT
Start: 2020-06-30 | End: 2020-06-30 | Stop reason: SDUPTHER

## 2020-06-30 RX ORDER — ASPIRIN 81 MG/1
324 TABLET, CHEWABLE ORAL ONCE
Status: COMPLETED | OUTPATIENT
Start: 2020-06-30 | End: 2020-06-30

## 2020-06-30 RX ORDER — CARVEDILOL 3.12 MG/1
3.12 TABLET ORAL 2 TIMES DAILY WITH MEALS
Status: DISCONTINUED | OUTPATIENT
Start: 2020-06-30 | End: 2020-06-30

## 2020-06-30 RX ORDER — ISOSORBIDE MONONITRATE 30 MG/1
30 TABLET, EXTENDED RELEASE ORAL DAILY
Status: DISCONTINUED | OUTPATIENT
Start: 2020-06-30 | End: 2020-07-01 | Stop reason: HOSPADM

## 2020-06-30 RX ADMIN — ASPIRIN 81 MG 324 MG: 81 TABLET ORAL at 11:41

## 2020-06-30 RX ADMIN — SODIUM CHLORIDE, PRESERVATIVE FREE 10 ML: 5 INJECTION INTRAVENOUS at 20:50

## 2020-06-30 RX ADMIN — ATORVASTATIN CALCIUM 20 MG: 20 TABLET, FILM COATED ORAL at 20:50

## 2020-06-30 RX ADMIN — NITROGLYCERIN 0.4 MG: 0.4 TABLET SUBLINGUAL at 16:51

## 2020-06-30 RX ADMIN — IOPAMIDOL 75 ML: 755 INJECTION, SOLUTION INTRAVENOUS at 11:39

## 2020-06-30 RX ADMIN — ISOSORBIDE MONONITRATE 30 MG: 30 TABLET, EXTENDED RELEASE ORAL at 17:38

## 2020-06-30 RX ADMIN — NITROGLYCERIN 0.4 MG: 0.4 TABLET SUBLINGUAL at 16:46

## 2020-06-30 ASSESSMENT — PAIN DESCRIPTION - DESCRIPTORS
DESCRIPTORS: PRESSURE
DESCRIPTORS: HEAVINESS

## 2020-06-30 ASSESSMENT — PAIN DESCRIPTION - LOCATION
LOCATION: CHEST
LOCATION: CHEST

## 2020-06-30 ASSESSMENT — PAIN SCALES - GENERAL
PAINLEVEL_OUTOF10: 0
PAINLEVEL_OUTOF10: 4
PAINLEVEL_OUTOF10: 0
PAINLEVEL_OUTOF10: 0

## 2020-06-30 ASSESSMENT — PAIN DESCRIPTION - PAIN TYPE
TYPE: ACUTE PAIN
TYPE: ACUTE PAIN

## 2020-06-30 ASSESSMENT — PAIN DESCRIPTION - FREQUENCY
FREQUENCY: CONTINUOUS
FREQUENCY: CONTINUOUS

## 2020-06-30 NOTE — ED NOTES
Dr. Ryne Barnett called back, speaking with Dr. Aldo ELLIS, [facility]. Charles Mak.  [place], RN  06/30/20 8665

## 2020-06-30 NOTE — ED PROVIDER NOTES
46s and 62s, brother had stroke and MI in his 76s. son had MI in 45s other son stroke at young age   [facility] GERD (gastroesophageal reflux disease)     Hyperlipidemia     Internal hemorrhoids 10-    Irritable bowel syndrome     Nausea & vomiting     Osteoarthritis     Rosacea        CURRENT MEDICATIONS:   Home medications reviewed.     SURGICAL HISTORY:   Past Surgical History:   Procedure Laterality Date    APPENDECTOMY  1963    BREAST SURGERY  2002    BIOPSY    BUNIONECTOMY Left     CARDIAC CATHETERIZATION       SECTION      CHOLECYSTECTOMY      COLONOSCOPY  2018    FOOT SURGERY      Multiple Foot surgeries including pin in ankle and Heel Spurs    FOOT TENDON SURGERY      TUBAL LIGATION  1982    TUMOR REMOVAL      NECK X2       FAMILY HISTORY:   Family History   Problem Relation Age of Onset    Other Son 22        CROHNS       SOCIAL HISTORY:   Social History     Socioeconomic History    Marital status:      Spouse name: Not on file    Number of children: Not on file    Years of education: Not on file    Highest education level: Not on file   Occupational History    Not on file   Social Needs    Financial resource strain: Not on file    Food insecurity     Worry: Not on file     Inability: Not on file    Transportation needs     Medical: Not on file     Non-medical: Not on file   Tobacco Use    Smoking status: Never Smoker    Smokeless tobacco: Never Used   Substance and Sexual Activity    Alcohol use: No    Drug use: No    Sexual activity: Yes     Partners: Male   Lifestyle    Physical activity     Days per week: Not on file     Minutes per session: Not on file    Stress: Not on file   Relationships    Social connections     Talks on phone: Not on file     Gets together: Not on file     Attends Islam service: Not on file     Active member of club or organization: Not on file     Attends meetings of clubs or organizations: Not on file Relationship status: Not on file    Intimate partner violence     Fear of current or ex partner: Not on file     Emotionally abused: Not on file     Physically abused: Not on file     Forced sexual activity: Not on file   Other Topics Concern    Not on file   Social History Narrative    Not on file       ALLERGIES: Patient has no known allergies. PHYSICAL EXAM:  VITAL SIGNS:   ED Triage Vitals   Enc Vitals Group      BP       Pulse       Resp       Temp       Temp src       SpO2       Weight       Height       Head Circumference       Peak Flow       Pain Score       Pain Loc       Pain Edu? Excl. in [address]? Constitutional:  Non-toxic appearance  HENT: Normocephalic, Atraumatic, Bilateral external ears normal, Oropharynx moist, No oral exudates, Nose normal.  Eyes:  PERRL, Conjunctiva normal, No discharge. Neck: Normal range of motion, No tenderness, Supple, No stridor, No lymphadenopathy  Cardiovascular:  Normal heart rate, Normal rhythm  Pulmonary/Chest:  Normal breath sounds, No respiratory distress, No wheezing, no chest tenderness  Abdomen:   Bowel sounds normal, Soft, No tenderness, No masses, No pulsatile masses  Back:  No tenderness, No CVA tenderness  Extremities:  Normal range of motion, Intact distal pulses, No edema, No tenderness  Skin:  Warm, Dry, No erythema, No rash    EKG Interpretation  Interpreted by me  No prior to compare to  Rhythm: normal sinus  Rate: normal 64  Axis: normal  Ectopy: none  Conduction: normal  ST Segments: normal  T Waves: inverted in lead 3, otherwise normal  Clinical Impression: normal sinus rhythm, inverted t-waves in lead 3    Cardiac Monitor Strip Interpretation  Interpreted by me  Monitor strip interpreted for greater than 10 seconds  Rhythm: normal sinus  Rate: normal  Ectopy: none  ST Segments: normal      Radiology / Procedures:  CTA CHEST W CONTRAST (Final result)   Result time 06/30/20 11:54:04   Final result by Dieudonne Jauregui MD (06/30/20 11:54:04) Impression:    1. No evidence of aortic dissection or other acute process within the chest.  2. Air trapping is present, suggestive of small airways disease. Narrative:    EXAMINATION:  CTA OF THE CHEST 6/30/2020 11:16 am    TECHNIQUE:  CTA of the chest was performed after the administration of intravenous  contrast.  Multiplanar reformatted images are provided for review.  MIP  images are provided for review. Dose modulation, iterative reconstruction,  and/or weight based adjustment of the mA/kV was utilized to reduce the  radiation dose to as low as reasonably achievable. COMPARISON:  None. HISTORY:  ORDERING SYSTEM PROVIDED HISTORY: Chest pain radiating to back, evaluate for  aortic dissection  TECHNOLOGIST PROVIDED HISTORY:  Reason for exam:->Chest pain radiating to back, evaluate for aortic dissection  Reason for Exam: Chest pain radiating to back, evaluate for aortic dissection  Acuity: Acute  Type of Exam: Initial    FINDINGS:  Aorta: Motion artifact limits evaluation of the ascending aorta.  Mild aortic  atherosclerosis.  No dissection or aneurysmal dilation. Mediastinum: No evidence of mediastinal lymphadenopathy.  The heart and  pericardium demonstrate no acute abnormality. Lungs/Pleura: Air trapping is present on this expiratory phase exam.  No  focal consolidation or pulmonary edema.  No evidence of pleural effusion or  pneumothorax. Upper Abdomen: Status post cholecystectomy.  Possible hepatic steatosis. Soft Tissues/Bones: No acute bone or soft tissue abnormality.                    XR CHEST PORTABLE (Final result)   Result time 06/30/20 10:26:39   Final result by Dayna Quezada MD (06/30/20 10:26:39)                Impression:    No acute process. Narrative:    EXAMINATION:  ONE XRAY VIEW OF THE CHEST    6/30/2020 10:05 am    COMPARISON:  None.     HISTORY:  ORDERING SYSTEM PROVIDED HISTORY: chest pain  TECHNOLOGIST PROVIDED HISTORY:  Reason for exam:->chest pain    FINDINGS:  No focal consolidations.  No pleural effusion or pneumothorax.  Heart size is  within normal limits.  Cholecystectomy clips. Labs Reviewed   CBC WITH AUTO DIFFERENTIAL - Abnormal; Notable for the following components:       Result Value    RBC 3.90 (*)     Hemoglobin 11.9 (*)     Hematocrit 36.7 (*)     Segs Relative 69.2 (*)     Lymphocytes % 22.1 (*)     Monocytes % 6.9 (*)     All other components within normal limits   COMPREHENSIVE METABOLIC PANEL - Abnormal; Notable for the following components:    Glucose 106 (*)     GFR Non- 49 (*)     GFR  59 (*)     All other components within normal limits   TROPONIN       ED COURSE & MEDICAL DECISION MAKING:  Pertinent Labs & Imaging studies reviewed. (See chart for details)  On exam, the patient is afebrile and nontoxic appearing. She is hypertensive with a known history of hypertension and is asymptomatic. She is otherwise hemodynamically stable and neurologically intact. EKG shows a normal sinus rhythm with no ST elevation or depression. Labs are obtained and are significant for mild anemia with no other clinically significant lab abnormalities. Chest x-ray is negative. CTA of the chest is negative for aortic dissection or other acute process. Air trapping is present suggestive of small airway disease. The patient was treated with aspirin. The etiology of the patient's chest pain is unclear. I am unable to exclude cardiac etiology. Score is 4. I have a low suspicion for STEMI, thoracic aortic dissection, pulmonary embolism, pericardial effusion or pneumothorax. I recommended admission to the hospital and the patient was agreeable. I discussed the case with with the hospitalist who will admit the patient for further treatment and care. The patient is currently in stable condition awaiting admission. Clinical Impression:  1.  Chest pain, unspecified type        Disposition referral (if applicable):  Rajwinder Mohamud MD  [address] [place] [address]  [phone]            Disposition medications (if applicable):  Current Discharge Medication List            Comment: Please note this report has been produced using speech recognition software and may contain errors related to that system including errors in grammar, punctuation, and spelling, as well as words and phrases that may be inappropriate. If there are any questions or concerns please feel free to contact the dictating provider for clarification.         Ivonne Denise MD  06/30/20 9810

## 2020-06-30 NOTE — PROGRESS NOTES
Chitra Poole here to consult and is at patients bedside. Chitra Poole requesting giving patient nitroglycerin to see if the pressure goes away for patient. Patient c/o of chest pain at this time and is rating it a 4 out of 10 on pain scale and describing the pain as a \"heaviness\". Patient given nitro at this time per PRN EMAR.

## 2020-06-30 NOTE — PROGRESS NOTES
Admission skin assessment complete by this nurse and [facility].  No issues noted

## 2020-06-30 NOTE — CONSULTS
[facility]  1950      Referring physician:   Juana Hawkins MD     Primary care physician:  Becky Flores MD    Reason for consultation: Chest pain        History of present illness:  60-year-old female admitted for emergency room for further evaluation of chest pain. She reports chest discomfort to be pressure midsternal radiating in between the shoulder blade and to the right shoulder and to the throat on a scale of 1-10 it was 9 in severity at around 8:30 AM today. She denies any nausea vomiting or diaphoresis. She denies any belching or burping. She continues to have some residual pressure-like feeling on a scale of 1-10 it is 3 in severity. She was on Nexium until recently which has been taken off from her medications. She has no known cardiac history except history of cardiac murmur. She mentioned these symptoms to her . They were going for grocery shopping to \[facility]\"" and on their way to store they change their mind and he drove her to emergency room to be checked in [place]. She has hypertension and hyperlipidemia in addition to family history of coronary artery disease. She has never been a smoker. She does not exercise but stays active with chores and yard maintenance. She apparently had a stress test several years ago at [facility] for different types of symptoms. She never had a cardiac cath. REVIEW OF SYSTEMS:   Constitutional.  Denies any fevers chills or weight loss or weight gain. Eyes. . Negative for blood in the region  ENT. . Negative for recent runny nose  [facility] as in HPI  Respiratory. Negative for cough or shortness of breath  Gastroenterology. Has history of heartburns was on Nexium. Has history of colonic polyps  Neurologic. Negative for TIA CVA. Musculoskeletal.  Negative for back back problems. Has arthritis . Endocrine.   Negative for thyroid or suppository 650 mg  650 mg Rectal Q6H PRN Mickie Villa MD        polyethylene glycol (GLYCOLAX) packet 17 g  17 g Oral Daily PRN Mickie Villa MD        promethazine (PHENERGAN) tablet 12.5 mg  12.5 mg Oral Q6H PRN Mickie Villa MD        Or    ondansetron (ZOFRAN) injection 4 mg  4 mg Intravenous Q6H PRN Mickie iVlla MD        enoxaparin (LOVENOX) injection 40 mg  40 mg Subcutaneous Daily Mickie Villa MD        nitroGLYCERIN (NITROSTAT) SL tablet 0.4 mg  0.4 mg Sublingual Q5 Min PRN Mickie Villa MD   0.4 mg at 06/30/20 1651    aspirin EC tablet 81 mg  81 mg Oral Daily Mickie Villa MD        isosorbide mononitrate (IMDUR) extended release tablet 30 mg  30 mg Oral Daily Mickie Villa MD         Physical Examination:      Vitals:    06/30/20 1045 06/30/20 1100 06/30/20 1120 06/30/20 1356   BP: (!) 141/72 (!) 158/60 139/60 136/62   Pulse: 56 51 50 56   Resp: 12 10 16 16   Temp:    98.2 °F (36.8 °C)   TempSrc:    Oral   SpO2: 97% 100% 97% 98%   Weight:    166 lb 11.2 oz (75.6 kg)   Height:    5' 4\" (1.626 m)   Body mass index is 28.61 kg/m². Wt Readings from Last 3 Encounters:   06/30/20 166 lb 11.2 oz (75.6 kg)   12/04/19 164 lb (74.4 kg)   08/26/19 164 lb 11 oz (74.7 kg)     General appearance: Mildly overweight pleasant female no apparent distress. Eyes: Pupils are equal.  No conjunctival pallor noted. ENT: Unremarkable    NECK: No JVP or thyromegaly    Cardiovascular: Auscultation: Normal S1 and S2.  2/6 systolic ejection murmur noted in the aortic area and left sternal border. No bruits noted in both carotids. Abdominal aorta is nonpalpable. No epigastric bruit noted. Peripheral pulses: Pedal pulses are 2+ equal in both feet. Respiratory:  Respiratory effort is normal.  Breath sounds are clear to auscultation    Extremities:  No edema, clubbing,  Cyanosis, petechiae. SKIN: Warm and well perfused, no pallor or cyanosis    Abdomen:  No masses or tenderness.  No organomegaly noted. Musculoskeletal:  Negative for any obvious Spinal deformities Muscle strength is normal.    Neurologic:  Oriented to time, place, and person and non-anxious. No focal neurological deficit noted. Psychiatric: Judgment/Insight and Mood is normal    Lab Review   Recent Labs     06/30/20  1015   WBC 6.6   HGB 11.9*   HCT 36.7*         Recent Labs     06/30/20  1015      K 3.9      CO2 22   BUN 13   CREATININE 1.1     Recent Labs     06/30/20  1015   AST 20   ALT 14   BILITOT 0.5   ALKPHOS 95     Lab Results   Component Value Date    CHOL 158 12/04/2019    TRIG 156 (H) 12/04/2019    HDL 59 12/04/2019    LDLCALC 68 12/04/2019     Recent Labs     06/30/20  1000 06/30/20  1400   TROPONINT <0.010 <0.010     OTHER TEST RESULTS REVIEWED    EKG:  Normal sinus rhythm 64 bpm.    Moderate voltage criteria for LVH, may be normal variant   Borderline ECG   No previous ECGs available     Chest Xray:  Done today reported  FINDINGS:   No focal consolidations. No pleural effusion or pneumothorax. Heart size is   within normal limits. Cholecystectomy clips. Impression   No acute process. CTA chest done today reported  1. No evidence of aortic dissection or other acute process within the chest. 2. Air trapping is present, suggestive of small airways disease. Assessment and Recommendations: Active Hospital Problems    Diagnosis Date Noted    Precordial chest pain [R07.2] 06/30/2020    Family history of early CAD [Z82.49]     Benign essential HTN [I10]     Anxiety state [F41.1]     Hyperlipidemia [E78.5]        Will review echocardiogram to evaluate heart murmur and for any regional wall motion abnormality. Will try sublingual nitroglycerin to see if it helps the minimal discomfort she has. We'll start her on Imdur 30 mg a day. Her heart rate was in 50s earlier so she may not tolerate beta blocker therapy.   If she continues to have symptoms or has elevated troponins. Consider cardiac cath otherwise prefer to evaluate it noninvasively. Multiple questions are answered and she will bless understanding. Discussed with the nurse taking care of her. Discussed with Dr. Reena Hillman. Shade Nation MD, 6/30/2020 4:57 PM     Please note this report has been produced using speech recognition software and may contain errors related to that system including errors in grammar, punctuation, and spelling, as well as words and phrases that may be inappropriate.  If there are any questions or concerns please feel free to contact the dictating provider for clarification

## 2020-06-30 NOTE — H&P
History and Physical  Juno Cunningham MD    2020  Radha Peter  1950    PCP: Karley Elena MD    ASSESSMENT AND PLAN:      1. Typical chest pain, concerning for angina related to acs  - Cardiology consulted  - trend troponin  Repeat ekg in am, walk in am  - order covid for heart cath Friday; may be sooner if echo is abnormal tomorrow. if cp free in am or cp returns or has while here then to go to [facility]. 2. Bradycardia  - dc amlodipine and holding bb for now  - discussed with cardiology    3. htn  Can continue with maxzide     DVT prophy -  lovenox    Expected LOS  At least 24-48 hours    Cc: chest pain    HPI: Radha Peter is a 78 y.o. Came to ed for chest pressure. It started around 9 this morning and lasted 30 minutes started with intense right jaw pain and came into the teeth, then down into the throat. She had chest pain radiate across the front of her chest then go to the back. She was taken off nexium a week ago, and had a whopper and pizza yesterday, but does not feel this is consistent with heart burn. Last stress test was over ten years ago. Presently rated cp as 3-4/10 and was relieved with 2 sublingual nitroglycerine. PMHX:   Past Medical History:   Diagnosis Date    Anxiety state     Arthritis     Benign essential HTN     Breast lump     Bunion of great toe of right foot     CKD (chronic kidney disease), stage II     Colon polyp 10-    GERD (gastroesophageal reflux disease)     Hyperlipidemia     Internal hemorrhoids 10-    Irritable bowel syndrome     Nausea & vomiting     Osteoarthritis     Rosacea      PSHX:  has a past surgical history that includes  section (); Tubal ligation (); Appendectomy (); Breast surgery (); Cholecystectomy (); Cardiac catheterization (); tumor removal; Foot Tendon Surgery; Bunionectomy (Left); Foot surgery; and Colonoscopy (2018).     Meds - list of home medications reviewed in electronic chart and confirmed  Allergies: No Known Allergies    FAM HX: family history includes Other (age of onset: 22) in her son. Soc HX:   Social History     Socioeconomic History    Marital status:      Spouse name: None    Number of children: None    Years of education: None    Highest education level: None   Occupational History    None   Social Needs    Financial resource strain: None    Food insecurity     Worry: None     Inability: None    Transportation needs     Medical: None     Non-medical: None   Tobacco Use    Smoking status: Never Smoker    Smokeless tobacco: Never Used   Substance and Sexual Activity    Alcohol use: No    Drug use: No    Sexual activity: Yes     Partners: Male   Lifestyle    Physical activity     Days per week: None     Minutes per session: None    Stress: None   Relationships    Social connections     Talks on phone: None     Gets together: None     Attends [employer] service: None     Active member of club or organization: None     Attends meetings of clubs or organizations: None     Relationship status: None    Intimate partner violence     Fear of current or ex partner: None     Emotionally abused: None     Physically abused: None     Forced sexual activity: None   Other Topics Concern    None   Social History Narrative    None       ROS: a ten point ros with pertinent positives and negatives in HPI, otherwise negative    EXAM:  Blood pressure 139/60, pulse 50, temperature 98.4 °F (36.9 °C), temperature source Oral, resp. rate 16, height 5' 4\" (1.626 m), weight 167 lb (75.8 kg), SpO2 97 %, not currently breastfeeding.   Gen:  awake, alert, cooperative, no apparent distress   EYES:  Lids and lashes normal, pupils equal, round and reactive to light, extra ocular muscles intact, sclera clear, conjunctiva normal  ENT:  Normocephalic, oral pharynx with moist mucus membranes, tonsils without erythema or exudates,  NECK:  Supple, symmetrical, trachea midline, no adenopathy,  LUNGS:  Clear to auscultate bilaterally, no rales ronchi or wheezing noted. CARDIOVASCULAR:  regular rate and rhythm, normal S1 and S2,no murmur/gallop/rub  ABDOMEN: Normal BS, Non tender, non distended, no HSM noted. MUSCULOSKELETAL:  ROM of all extremities grossly wnl  NEUROLOGIC: AOx 3,  Cranial nerves II-XII are grossly intact. Motor is 5 out of 5 bilaterally. Sensory is intact  SKIN:  no bruising or bleeding, normal skin color, texture, turgor and no redness, warmth, or swelling        LABS in ER reviewed    Xr Chest Portable    Result Date: 6/30/2020  EXAMINATION: ONE XRAY VIEW OF THE CHEST 6/30/2020 10:05 am COMPARISON: None. HISTORY: ORDERING SYSTEM PROVIDED HISTORY: chest pain TECHNOLOGIST PROVIDED HISTORY: Reason for exam:->chest pain FINDINGS: No focal consolidations. No pleural effusion or pneumothorax. Heart size is within normal limits. Cholecystectomy clips. No acute process. Cta Chest W Contrast    Result Date: 6/30/2020  EXAMINATION: CTA OF THE CHEST 6/30/2020 11:16 am TECHNIQUE: CTA of the chest was performed after the administration of intravenous contrast.  Multiplanar reformatted images are provided for review. MIP images are provided for review. Dose modulation, iterative reconstruction, and/or weight based adjustment of the mA/kV was utilized to reduce the radiation dose to as low as reasonably achievable. COMPARISON: None. HISTORY: ORDERING SYSTEM PROVIDED HISTORY: Chest pain radiating to back, evaluate for aortic dissection TECHNOLOGIST PROVIDED HISTORY: Reason for exam:->Chest pain radiating to back, evaluate for aortic dissection Reason for Exam: Chest pain radiating to back, evaluate for aortic dissection Acuity: Acute Type of Exam: Initial FINDINGS: Aorta: Motion artifact limits evaluation of the ascending aorta. Mild aortic atherosclerosis. No dissection or aneurysmal dilation. Mediastinum: No evidence of mediastinal lymphadenopathy.   The heart and pericardium demonstrate no acute abnormality. Lungs/Pleura: Air trapping is present on this expiratory phase exam.  No focal consolidation or pulmonary edema. No evidence of pleural effusion or pneumothorax. Upper Abdomen: Status post cholecystectomy. Possible hepatic steatosis. Soft Tissues/Bones: No acute bone or soft tissue abnormality.      1. No evidence of aortic dissection or other acute process within the chest. 2. Air trapping is present, suggestive of small airways disease.   -  Patient Active Problem List   Diagnosis    Rectal pain    Diarrhea    IBS (irritable bowel syndrome)    Breast lump    Benign essential HTN    Colon polyp    Hyperlipidemia    CKD (chronic kidney disease), stage II    Anxiety state    Rosacea    Gastroesophageal reflux disease without esophagitis    Other osteoarthritis of spine    Precordial chest pain     ______________________________________________________________    Electronically signed by Bruce Webb MD on 6/30/2020 at 1:59 PM

## 2020-07-01 VITALS
WEIGHT: 166 LBS | RESPIRATION RATE: 16 BRPM | TEMPERATURE: 96.9 F | OXYGEN SATURATION: 97 % | BODY MASS INDEX: 28.34 KG/M2 | DIASTOLIC BLOOD PRESSURE: 55 MMHG | SYSTOLIC BLOOD PRESSURE: 111 MMHG | HEART RATE: 55 BPM | HEIGHT: 64 IN

## 2020-07-01 LAB
CHOLESTEROL: 147 MG/DL
EKG ATRIAL RATE: 59 BPM
EKG DIAGNOSIS: NORMAL
EKG P AXIS: 59 DEGREES
EKG P-R INTERVAL: 166 MS
EKG Q-T INTERVAL: 444 MS
EKG QRS DURATION: 74 MS
EKG QTC CALCULATION (BAZETT): 439 MS
EKG R AXIS: -30 DEGREES
EKG T AXIS: 13 DEGREES
EKG VENTRICULAR RATE: 59 BPM
HCT VFR BLD CALC: 34.4 % (ref 37–47)
HDLC SERPL-MCNC: 46 MG/DL
HEMOGLOBIN: 11.1 GM/DL (ref 12.5–16)
LDL CHOLESTEROL DIRECT: 77 MG/DL
LV EF: 58 %
LVEF MODALITY: NORMAL
MCH RBC QN AUTO: 30.9 PG (ref 27–31)
MCHC RBC AUTO-ENTMCNC: 32.3 % (ref 32–36)
MCV RBC AUTO: 95.8 FL (ref 78–100)
PDW BLD-RTO: 12.9 % (ref 11.7–14.9)
PLATELET # BLD: 171 K/CU MM (ref 140–440)
PMV BLD AUTO: 10.2 FL (ref 7.5–11.1)
RBC # BLD: 3.59 M/CU MM (ref 4.2–5.4)
SARS-COV-2, NAAT: NOT DETECTED
TRIGL SERPL-MCNC: 214 MG/DL
WBC # BLD: 7.5 K/CU MM (ref 4–10.5)

## 2020-07-01 PROCEDURE — 96372 THER/PROPH/DIAG INJ SC/IM: CPT

## 2020-07-01 PROCEDURE — 99226 PR SBSQ OBSERVATION CARE/DAY 35 MINUTES: CPT | Performed by: INTERNAL MEDICINE

## 2020-07-01 PROCEDURE — 6370000000 HC RX 637 (ALT 250 FOR IP): Performed by: INTERNAL MEDICINE

## 2020-07-01 PROCEDURE — G0378 HOSPITAL OBSERVATION PER HR: HCPCS

## 2020-07-01 PROCEDURE — 93010 ELECTROCARDIOGRAM REPORT: CPT | Performed by: INTERNAL MEDICINE

## 2020-07-01 PROCEDURE — 85027 COMPLETE CBC AUTOMATED: CPT

## 2020-07-01 PROCEDURE — 6360000002 HC RX W HCPCS: Performed by: INTERNAL MEDICINE

## 2020-07-01 PROCEDURE — 83721 ASSAY OF BLOOD LIPOPROTEIN: CPT

## 2020-07-01 PROCEDURE — U0002 COVID-19 LAB TEST NON-CDC: HCPCS

## 2020-07-01 PROCEDURE — 93306 TTE W/DOPPLER COMPLETE: CPT

## 2020-07-01 PROCEDURE — 2580000003 HC RX 258: Performed by: INTERNAL MEDICINE

## 2020-07-01 PROCEDURE — 80061 LIPID PANEL: CPT

## 2020-07-01 PROCEDURE — 36415 COLL VENOUS BLD VENIPUNCTURE: CPT

## 2020-07-01 PROCEDURE — 93005 ELECTROCARDIOGRAM TRACING: CPT | Performed by: INTERNAL MEDICINE

## 2020-07-01 RX ORDER — ASPIRIN 81 MG/1
81 TABLET ORAL DAILY
Qty: 30 TABLET | Refills: 3 | Status: SHIPPED | OUTPATIENT
Start: 2020-07-01 | End: 2020-07-21 | Stop reason: SDUPTHER

## 2020-07-01 RX ORDER — NITROGLYCERIN 0.4 MG/1
TABLET SUBLINGUAL
Qty: 25 TABLET | Refills: 3 | Status: SHIPPED | OUTPATIENT
Start: 2020-07-01 | End: 2021-09-29 | Stop reason: SDUPTHER

## 2020-07-01 RX ORDER — ISOSORBIDE MONONITRATE 30 MG/1
30 TABLET, EXTENDED RELEASE ORAL DAILY
Qty: 30 TABLET | Refills: 3 | Status: SHIPPED | OUTPATIENT
Start: 2020-07-01 | End: 2020-07-21 | Stop reason: SDUPTHER

## 2020-07-01 RX ADMIN — ASPIRIN 81 MG: 81 TABLET, COATED ORAL at 09:15

## 2020-07-01 RX ADMIN — PANTOPRAZOLE SODIUM 40 MG: 40 TABLET, DELAYED RELEASE ORAL at 06:12

## 2020-07-01 RX ADMIN — TRIAMTERENE AND HYDROCHLOROTHIAZIDE 0.5 TABLET: 37.5; 25 TABLET ORAL at 09:15

## 2020-07-01 RX ADMIN — ENOXAPARIN SODIUM 40 MG: 40 INJECTION SUBCUTANEOUS at 09:15

## 2020-07-01 RX ADMIN — SODIUM CHLORIDE, PRESERVATIVE FREE 10 ML: 5 INJECTION INTRAVENOUS at 09:16

## 2020-07-01 RX ADMIN — ISOSORBIDE MONONITRATE 30 MG: 30 TABLET, EXTENDED RELEASE ORAL at 09:15

## 2020-07-01 ASSESSMENT — PAIN SCALES - GENERAL: PAINLEVEL_OUTOF10: 0

## 2020-07-01 NOTE — PLAN OF CARE
Problem: Falls - Risk of:  Goal: Will remain free from falls  Description: Will remain free from falls  6/30/2020 2238 by Medina Teague RN  Outcome: Ongoing  6/30/2020 2236 by Medina Teague RN  Outcome: Ongoing  Goal: Absence of physical injury  Description: Absence of physical injury  6/30/2020 2238 by Medina Teague RN  Outcome: Ongoing  6/30/2020 2236 by Medina Teague RN  Outcome: Ongoing     Problem: Pain:  Goal: Pain level will decrease  Description: Pain level will decrease  6/30/2020 2238 by Medina Teague RN  Outcome: Ongoing  6/30/2020 2236 by Medina Teague RN  Outcome: Ongoing  Goal: Control of acute pain  Description: Control of acute pain  6/30/2020 2238 by Medina Teague RN  Outcome: Ongoing  6/30/2020 2236 by Medina Teague RN  Outcome: Ongoing  Goal: Control of chronic pain  Description: Control of chronic pain  6/30/2020 2238 by Medina Teague RN  Outcome: Ongoing  6/30/2020 2236 by Medina Teague RN  Outcome: Ongoing     Problem: Discharge Planning:  Goal: Discharged to appropriate level of care  Description: Discharged to appropriate level of care  6/30/2020 2238 by Medina Teague RN  Outcome: Ongoing  6/30/2020 2236 by Medina Teague RN  Outcome: Ongoing yes

## 2020-07-01 NOTE — DISCHARGE SUMMARY
isosorbide mononitrate (IMDUR) 30 MG extended release tablet  Take 1 tablet by mouth daily             Lactase (LACTAID PO)  Take by mouth             Multiple Vitamins-Minerals (HAIR SKIN AND NAILS FORMULA PO)  Take by mouth             nabumetone (RELAFEN) 500 MG tablet  Take 1 tablet by mouth 2 times daily             nitroGLYCERIN (NITROSTAT) 0.4 MG SL tablet  up to max of 3 total doses. If no relief after 1 dose, call 911. Omega-3 Fatty Acids (FISH OIL) 1000 MG CAPS  Take 3,000 mg by mouth 3 times daily             simethicone (MYLICON) 80 MG chewable tablet  Take 80 mg by mouth every 6 hours as needed for Flatulence             triamterene-hydroCHLOROthiazide (MAXZIDE-25) 37.5-25 MG per tablet  Take 0.5 tablets by mouth daily                  Code Status: Full Code     Consults: none and cardiology  IP CONSULT TO CARDIOLOGY    Diet: cardiac diet    Activity: activity as tolerated   Work:    Discharged Condition: fair    Prognosis: Fair    Disposition: home    Follow-up with   1. PCP within   5-7    Days  2. cardiologist in a couple of days       Discharge Physician Signed: Amanda Parsons M.D. The patient was seen and examined on day of discharge and this discharge summary is in conjunction with any daily progress note from day of discharge.   Time spent on discharge in the examination, evaluation, counseling and review of medications and discharge plan: 22 minutes

## 2020-07-01 NOTE — PLAN OF CARE
Problem: Falls - Risk of:  Goal: Will remain free from falls  7/1/2020 1054 by Danielito Khan RN  Outcome: Completed  6/30/2020 2238 by Silvana Morales RN  Outcome: Ongoing  6/30/2020 2236 by Silvana Morales RN  Outcome: Ongoing  Goal: Absence of physical injury  7/1/2020 1054 by Danielito Khan RN  Outcome: Completed  6/30/2020 2238 by Silvana Morales RN  Outcome: Ongoing  6/30/2020 2236 by Silvana Morales RN  Outcome: Ongoing     Problem: Pain:  Goal: Pain level will decrease  7/1/2020 1054 by Danielito Khan RN  Outcome: Completed  6/30/2020 2238 by Silvana Morales RN  Outcome: Ongoing  6/30/2020 2236 by Silvana Morales RN  Outcome: Ongoing  Goal: Control of acute pain  7/1/2020 1054 by Danielito Khan RN  Outcome: Completed  6/30/2020 2238 by Silvana Morales RN  Outcome: Ongoing  6/30/2020 2236 by Silvana Morales RN  Outcome: Ongoing  Goal: Control of chronic pain  7/1/2020 1054 by Danielito Khan RN  Outcome: Completed  6/30/2020 2238 by Silvana Morales RN  Outcome: Ongoing  6/30/2020 2236 by Silvana Morales RN  Outcome: Ongoing     Problem: Discharge Planning:  Goal: Discharged to appropriate level of care  7/1/2020 1054 by Danielito Khan RN  Outcome: Completed  6/30/2020 2238 by Silvana Morales RN  Outcome: Ongoing  6/30/2020 2236 by Silvana Morales RN  Outcome: Ongoing

## 2020-07-01 NOTE — PROGRESS NOTES
Cardiology Progress Note/ this is a video with    Radha Peter (78 y.o., female)  7/1/2020    Subjective:   Patient had complete relief of symptoms yesterday with nitroglycerin ×2 and had not had any recurrence. She had residual right and denied any motor symptoms. Her troponin is negative. Covid-19 is also negative. Allergies:  Patient has no known allergies. Scheduled Meds:   atorvastatin  20 mg Oral Nightly    pantoprazole  40 mg Oral QAM AC    triamterene-hydroCHLOROthiazide  0.5 tablet Oral Daily    sodium chloride flush  10 mL Intravenous 2 times per day    enoxaparin  40 mg Subcutaneous Daily    aspirin  81 mg Oral Daily    isosorbide mononitrate  30 mg Oral Daily       Objective:      Physical Exam:  Vitals:    06/30/20 1826   BP: (!) 117/56   Pulse: 63   Resp: 16   Temp: 97.2 °F (36.2 °C)   SpO2: 97%      General: AAO, NAD  Follows commands and asks relevant questions. She is not anxious when she is comfortable. Lab Review   Recent Labs     07/01/20  0523   WBC 7.5   HGB 11.1*   HCT 34.4*         Recent Labs     06/30/20  1015      K 3.9      CO2 22   BUN 13   CREATININE 1.1     Recent Labs     06/30/20  1015   AST 20   ALT 14   BILITOT 0.5   ALKPHOS 95     Lab Results   Component Value Date    CHOL 147 07/01/2020    TRIG 214 (H) 07/01/2020    HDL 46 07/01/2020    LDLCALC 68 12/04/2019    LDLDIRECT 77 07/01/2020     Recent Labs     06/30/20  1000 06/30/20  1400 06/30/20  1700   TROPONINT <0.010 <0.010 <0.010     EKG/Rhytyhm review:      Assessment:     Active Problems:    Benign essential HTN    Hyperlipidemia    Anxiety state    Chest pain    Family history of early CAD  Resolved Problems:    * No resolved hospital problems. *      Plan: Will ambulate her in the hallways. We'll follow up on echocardiogram.  If she is symptom free she could be discharged for outpatient follow-up for stress Cardiolite for further evaluation.   If she has recurrent symptoms requiring nitroglycerin will consider aggressive invasive evaluation on urgent and emergent basis. Details are discussed with her and she verbalized understanding and asked relevant questions.   Discussed with Dr. Alma Rosa Lopez MD, 7/1/2020 8:49 AM

## 2020-07-02 ENCOUNTER — TELEPHONE (OUTPATIENT)
Dept: CARDIOLOGY CLINIC | Age: 70
End: 2020-07-02

## 2020-07-07 ENCOUNTER — PROCEDURE VISIT (OUTPATIENT)
Dept: CARDIOLOGY CLINIC | Age: 70
End: 2020-07-07
Payer: MEDICARE

## 2020-07-07 LAB
LV EF: 71 %
LVEF MODALITY: NORMAL

## 2020-07-07 PROCEDURE — 93015 CV STRESS TEST SUPVJ I&R: CPT | Performed by: INTERNAL MEDICINE

## 2020-07-07 PROCEDURE — A9500 TC99M SESTAMIBI: HCPCS | Performed by: INTERNAL MEDICINE

## 2020-07-07 PROCEDURE — 78452 HT MUSCLE IMAGE SPECT MULT: CPT | Performed by: INTERNAL MEDICINE

## 2020-07-09 ENCOUNTER — TELEPHONE (OUTPATIENT)
Dept: CARDIOLOGY CLINIC | Age: 70
End: 2020-07-09

## 2020-07-09 NOTE — TELEPHONE ENCOUNTER
Normal Study negative for ischemia. Normal LV size and systolic function.    Ejection fraction is 71 %.    Hypertension with hypertensive blood pressure response to exercise is noted.    Exercise tolerance is fair for age, patient exercised for 3 minutes on    standard Matthew protocol.        Recommendation    Continue lifestyle and risk modification for primary prevention. Pt notified of Stress test results; pt voiced understanding.

## 2020-07-15 ENCOUNTER — TELEPHONE (OUTPATIENT)
Dept: FAMILY MEDICINE CLINIC | Age: 70
End: 2020-07-15

## 2020-07-21 ENCOUNTER — OFFICE VISIT (OUTPATIENT)
Dept: CARDIOLOGY CLINIC | Age: 70
End: 2020-07-21
Payer: MEDICARE

## 2020-07-21 VITALS
HEIGHT: 64 IN | SYSTOLIC BLOOD PRESSURE: 154 MMHG | TEMPERATURE: 97.3 F | DIASTOLIC BLOOD PRESSURE: 72 MMHG | HEART RATE: 72 BPM | BODY MASS INDEX: 28.51 KG/M2 | WEIGHT: 167 LBS | RESPIRATION RATE: 16 BRPM

## 2020-07-21 PROCEDURE — 1090F PRES/ABSN URINE INCON ASSESS: CPT | Performed by: INTERNAL MEDICINE

## 2020-07-21 PROCEDURE — 3017F COLORECTAL CA SCREEN DOC REV: CPT | Performed by: INTERNAL MEDICINE

## 2020-07-21 PROCEDURE — 99214 OFFICE O/P EST MOD 30 MIN: CPT | Performed by: INTERNAL MEDICINE

## 2020-07-21 PROCEDURE — 1036F TOBACCO NON-USER: CPT | Performed by: INTERNAL MEDICINE

## 2020-07-21 PROCEDURE — G8417 CALC BMI ABV UP PARAM F/U: HCPCS | Performed by: INTERNAL MEDICINE

## 2020-07-21 PROCEDURE — G8400 PT W/DXA NO RESULTS DOC: HCPCS | Performed by: INTERNAL MEDICINE

## 2020-07-21 PROCEDURE — 1123F ACP DISCUSS/DSCN MKR DOCD: CPT | Performed by: INTERNAL MEDICINE

## 2020-07-21 PROCEDURE — G8427 DOCREV CUR MEDS BY ELIG CLIN: HCPCS | Performed by: INTERNAL MEDICINE

## 2020-07-21 PROCEDURE — 4040F PNEUMOC VAC/ADMIN/RCVD: CPT | Performed by: INTERNAL MEDICINE

## 2020-07-21 RX ORDER — ASPIRIN 81 MG/1
81 TABLET ORAL DAILY
Qty: 90 TABLET | Refills: 3 | Status: SHIPPED | OUTPATIENT
Start: 2020-07-21

## 2020-07-21 RX ORDER — ISOSORBIDE MONONITRATE 30 MG/1
30 TABLET, EXTENDED RELEASE ORAL DAILY
Qty: 90 TABLET | Refills: 3 | Status: SHIPPED | OUTPATIENT
Start: 2020-07-21 | End: 2021-01-26 | Stop reason: SDUPTHER

## 2020-07-21 NOTE — ASSESSMENT & PLAN NOTE
Has component of whitecoat syndrome otherwise well controlled. She is counseled to start monitoring blood pressure regularly at home and bring the results. Goal is to keep it below 140/90. Weight management regularly exercises and salt restriction counseled.

## 2020-07-21 NOTE — ASSESSMENT & PLAN NOTE
Counseled for stress management and regular exercises. Test results are discussed. Questions answered.

## 2020-07-21 NOTE — ASSESSMENT & PLAN NOTE
Had not had much recurrence since last visit in the hospital.  Continue aggressive risk modification for primary prevention and carry nitroglycerin for when necessary usage and call us if she has recurrence of symptoms. Had negative stress test for ischemia.

## 2020-07-21 NOTE — PROGRESS NOTES
Jacob Clark  1950  Lesa Killian MD    Chief complaint and HPI:  Jacob Clark  is a 78 y.o. female following up with chest tightness which led her to be admitted to the hospital.  She hadn't had much discomfort since then. She does not exercise regularly. She is taking her medications regularly. She usually has a tendency to run high blood pressure when she is in doctor's office otherwise her blood pressure at home has been 634 to 190 systolic which she monitors sporadically. Her medications are reviewed. She does not smoke. Rest of the Cardiovascular system review is otherwise unchanged from prior encounter. Past medical history:  has a past medical history of Anxiety state, Arthritis, Benign essential HTN, Breast lump, Bunion of great toe of right foot, CKD (chronic kidney disease), stage II, Colon polyp, Family history of early CAD, GERD (gastroesophageal reflux disease), History of nuclear stress test, Hyperlipidemia, Internal hemorrhoids, Irritable bowel syndrome, Nausea & vomiting, Osteoarthritis, and Rosacea. Past surgical history:  has a past surgical history that includes  section (); Tubal ligation (); Appendectomy (); Breast surgery (); Cholecystectomy (); Cardiac catheterization (); tumor removal; Foot Tendon Surgery; Bunionectomy (Left); Foot surgery; Colonoscopy (2018); and Diagnostic Cardiac Cath Lab Procedure. Social History:   Social History     Tobacco Use    Smoking status: Never Smoker    Smokeless tobacco: Never Used   Substance Use Topics    Alcohol use: No     Family history: family history includes Other (age of onset: 22) in her son. ALLERGIES:  Patient has no known allergies. Prior to Admission medications    Medication Sig Start Date End Date Taking?  Authorizing Provider   isosorbide mononitrate (IMDUR) 30 MG extended release tablet Take 1 tablet by mouth daily 20  Yes Kj Chan MD   aspirin 81 MG EC tablet Take 1 tablet by mouth daily 7/21/20  Yes Radha Ware MD   nitroGLYCERIN (NITROSTAT) 0.4 MG SL tablet up to max of 3 total doses. If no relief after 1 dose, call 911. 7/1/20  Yes Katarzyna Strange MD   atorvastatin (LIPITOR) 20 MG tablet Take 1 tablet by mouth daily 6/26/20  Yes Michaela Lew MD   nabumetone (RELAFEN) 500 MG tablet Take 1 tablet by mouth 2 times daily 6/26/20  Yes Michaela Lew MD   triamterene-hydroCHLOROthiazide [facility]) 37.5-25 MG per tablet Take 0.5 tablets by mouth daily 6/26/20  Yes Michaela Lew MD   DENTA 5000 PLUS 1.1 % CREA Take 1 Dose by mouth nightly 10/14/19  Yes Historical Provider, MD   Omega-3 Fatty Acids (FISH OIL) 1000 MG CAPS Take 3,000 mg by mouth 3 times daily   Yes Historical Provider, MD   Calcium Carbonate-Vitamin D (CALCIUM 600+D HIGH POTENCY PO) Take by mouth   Yes Historical Provider, MD   Multiple Vitamins-Minerals (HAIR SKIN AND NAILS FORMULA PO) Take by mouth   Yes Historical Provider, MD   simethicone (MYLICON) 80 MG chewable tablet Take 80 mg by mouth every 6 hours as needed for Flatulence   Yes Historical Provider, MD   Lactase (LACTAID PO) Take by mouth   Yes Historical Provider, MD     Vitals:    07/21/20 0904 07/21/20 0915   BP: (!) 164/80 (!) 154/72   Site: Left Upper Arm    Position: Sitting    Cuff Size: Medium Adult    Pulse: 72    Resp: 16    Temp: 97.3 °F (36.3 °C)    Weight: 167 lb (75.8 kg)    Height: 5' 4\" (1.626 m)       Body mass index is 28.67 kg/m². Wt Readings from Last 3 Encounters:   07/21/20 167 lb (75.8 kg)   07/01/20 166 lb (75.3 kg)   12/04/19 164 lb (74.4 kg)     Cardiovascular: Auscultation: Normal S1 and S2.  2/6 systolic ejection murmur noted in the aortic area and left sternal border. No bruits noted in both carotids. Abdominal aorta is nonpalpable. No epigastric bruit noted.  Peripheral pulses: Pedal pulses are 2+ equal in both feet.     Respiratory:  Respiratory effort is normal.  Breath sounds are clear to auscultation     Extremities:  No edema, clubbing,  Cyanosis, petechiae.      SKIN: Warm and well perfused, no pallor or cyanosis     Abdomen:  No masses or tenderness. No organomegaly noted.     Musculoskeletal:  Negative for any obvious Spinal deformities Muscle strength is normal.     Neurologic:  Oriented to time, place, and person and non-anxious. No focal neurological deficit noted.     Psychiatric: Judgment/Insight and Mood is normal     Normal left ventricle structure and function. EF is 55-60 % . Impaired relaxation compatible with diastolic dysfunction. Mild tricuspid regurgitation and trace pulmonic regurgitation present. Right ventricular systolic pressure of 38 mm Hg . No evidence of pericardial effusion. Normal Study negative for ischemia. Normal LV size and systolic function. Ejection fraction is 71 %. Hypertension with hypertensive blood pressure response to exercise is noted. Exercise tolerance is fair for age, patient exercised for 3 minutes on  standard Matthew protocol. LAB REVIEW:  CBC:   Lab Results   Component Value Date    WBC 7.5 07/01/2020    HGB 11.1 07/01/2020    HCT 34.4 07/01/2020     07/01/2020     Lipids:   Lab Results   Component Value Date    CHOL 147 07/01/2020    TRIG 214 (H) 07/01/2020    HDL 46 07/01/2020    LDLCALC 68 12/04/2019    LDLDIRECT 77 07/01/2020     Renal:   Lab Results   Component Value Date    BUN 13 06/30/2020    CREATININE 1.1 06/30/2020     06/30/2020    K 3.9 06/30/2020     IMPRESSION and RECOMMENDATIONS:      Benign essential HTN  Has component of whitecoat syndrome otherwise well controlled. She is counseled to start monitoring blood pressure regularly at home and bring the results. Goal is to keep it below 140/90. Weight management regularly exercises and salt restriction counseled. Hyperlipidemia  Controlled on atorvastatin 20 mg daily. Continue the same.     Anxiety state  Counseled for stress management and regular exercises. Test results are discussed. Questions answered. Chest pain  Had not had much recurrence since last visit in the hospital.  Continue aggressive risk modification for primary prevention and carry nitroglycerin for when necessary usage and call us if she has recurrence of symptoms. Had negative stress test for ischemia. Primary prevention is the goal by aggressive risk modification, healthy and therapeutic life style changes for cardiovascular risk reduction. Various goals are discussed and questions answered. Counseled for regular brisk walking for exercise for 20-30 minutes and weight management. Continue current cardiovascular medications which have been reviewed and discussed individually with you. Appropriate prescriptions if needed on this visit are addressed. After visit summery is provided. Questions answered and patient verbalizes understanding. Follow up in 6 months,  sooner if needed. Patient is fully trained in using the smart phone they have for virtual video office visit if needed in near future. [employer] verbalized understanding the technology and consents to use it if needed. Multiple questions answered. Patient appreciated this time spent in training. We have discussed Coronavirus precaution including handwashing practice, wiping items touched in public such as gas pumps, door handles, shopping carts, etc. Self monitoring for infection - fever, chills, cough, SOB. Should they develop symptoms they should call PCP office for further instructions. Yasmine Proctor MD, 7/21/2020 9:42 AM     Please note this report has been partially produced using speech recognition software and may contain errors related to that system including errors in grammar, punctuation, and spelling, as well as words and phrases that may be inappropriate. If there are any questions or concerns please feel free to contact the dictating provider for clarification.

## 2021-01-05 ENCOUNTER — OFFICE VISIT (OUTPATIENT)
Dept: FAMILY MEDICINE CLINIC | Age: 71
End: 2021-01-05
Payer: MEDICARE

## 2021-01-05 VITALS
WEIGHT: 159 LBS | BODY MASS INDEX: 27.14 KG/M2 | SYSTOLIC BLOOD PRESSURE: 140 MMHG | TEMPERATURE: 97.2 F | HEART RATE: 60 BPM | HEIGHT: 64 IN | DIASTOLIC BLOOD PRESSURE: 76 MMHG

## 2021-01-05 DIAGNOSIS — E78.2 MIXED HYPERLIPIDEMIA: ICD-10-CM

## 2021-01-05 DIAGNOSIS — F41.1 ANXIETY STATE: ICD-10-CM

## 2021-01-05 DIAGNOSIS — K21.9 GASTROESOPHAGEAL REFLUX DISEASE WITHOUT ESOPHAGITIS: ICD-10-CM

## 2021-01-05 DIAGNOSIS — I10 BENIGN ESSENTIAL HTN: ICD-10-CM

## 2021-01-05 DIAGNOSIS — M47.896 OTHER OSTEOARTHRITIS OF SPINE, LUMBAR REGION: ICD-10-CM

## 2021-01-05 DIAGNOSIS — I10 BENIGN ESSENTIAL HTN: Primary | ICD-10-CM

## 2021-01-05 DIAGNOSIS — N18.2 CKD (CHRONIC KIDNEY DISEASE), STAGE II: ICD-10-CM

## 2021-01-05 LAB
A/G RATIO: 1.8 (ref 1.1–2.2)
ALBUMIN SERPL-MCNC: 4.4 G/DL (ref 3.4–5)
ALP BLD-CCNC: 95 U/L (ref 40–129)
ALT SERPL-CCNC: 14 U/L (ref 10–40)
ANION GAP SERPL CALCULATED.3IONS-SCNC: 10 MMOL/L (ref 3–16)
AST SERPL-CCNC: 18 U/L (ref 15–37)
BILIRUB SERPL-MCNC: 0.7 MG/DL (ref 0–1)
BUN BLDV-MCNC: 16 MG/DL (ref 7–20)
CALCIUM SERPL-MCNC: 9.9 MG/DL (ref 8.3–10.6)
CHLORIDE BLD-SCNC: 102 MMOL/L (ref 99–110)
CHOLESTEROL, TOTAL: 176 MG/DL (ref 0–199)
CO2: 28 MMOL/L (ref 21–32)
CREAT SERPL-MCNC: 1 MG/DL (ref 0.6–1.2)
GFR AFRICAN AMERICAN: >60
GFR NON-AFRICAN AMERICAN: 55
GLOBULIN: 2.4 G/DL
GLUCOSE BLD-MCNC: 89 MG/DL (ref 70–99)
HCT VFR BLD CALC: 35.8 % (ref 36–48)
HDLC SERPL-MCNC: 62 MG/DL (ref 40–60)
HEMOGLOBIN: 12.4 G/DL (ref 12–16)
LDL CHOLESTEROL CALCULATED: 80 MG/DL
MCH RBC QN AUTO: 31.3 PG (ref 26–34)
MCHC RBC AUTO-ENTMCNC: 34.6 G/DL (ref 31–36)
MCV RBC AUTO: 90.7 FL (ref 80–100)
PDW BLD-RTO: 13.9 % (ref 12.4–15.4)
PLATELET # BLD: 182 K/UL (ref 135–450)
PMV BLD AUTO: 8.8 FL (ref 5–10.5)
POTASSIUM SERPL-SCNC: 4.4 MMOL/L (ref 3.5–5.1)
RBC # BLD: 3.94 M/UL (ref 4–5.2)
SODIUM BLD-SCNC: 140 MMOL/L (ref 136–145)
TOTAL PROTEIN: 6.8 G/DL (ref 6.4–8.2)
TRIGL SERPL-MCNC: 170 MG/DL (ref 0–150)
VLDLC SERPL CALC-MCNC: 34 MG/DL
WBC # BLD: 6.6 K/UL (ref 4–11)

## 2021-01-05 PROCEDURE — G8427 DOCREV CUR MEDS BY ELIG CLIN: HCPCS | Performed by: FAMILY MEDICINE

## 2021-01-05 PROCEDURE — 99214 OFFICE O/P EST MOD 30 MIN: CPT | Performed by: FAMILY MEDICINE

## 2021-01-05 PROCEDURE — G8400 PT W/DXA NO RESULTS DOC: HCPCS | Performed by: FAMILY MEDICINE

## 2021-01-05 PROCEDURE — G8417 CALC BMI ABV UP PARAM F/U: HCPCS | Performed by: FAMILY MEDICINE

## 2021-01-05 PROCEDURE — G8484 FLU IMMUNIZE NO ADMIN: HCPCS | Performed by: FAMILY MEDICINE

## 2021-01-05 PROCEDURE — 1036F TOBACCO NON-USER: CPT | Performed by: FAMILY MEDICINE

## 2021-01-05 PROCEDURE — 1090F PRES/ABSN URINE INCON ASSESS: CPT | Performed by: FAMILY MEDICINE

## 2021-01-05 PROCEDURE — 4040F PNEUMOC VAC/ADMIN/RCVD: CPT | Performed by: FAMILY MEDICINE

## 2021-01-05 PROCEDURE — 1123F ACP DISCUSS/DSCN MKR DOCD: CPT | Performed by: FAMILY MEDICINE

## 2021-01-05 PROCEDURE — 3017F COLORECTAL CA SCREEN DOC REV: CPT | Performed by: FAMILY MEDICINE

## 2021-01-05 RX ORDER — NABUMETONE 500 MG/1
500 TABLET, FILM COATED ORAL 2 TIMES DAILY
Qty: 180 TABLET | Refills: 1 | Status: SHIPPED | OUTPATIENT
Start: 2021-01-05 | End: 2021-07-06 | Stop reason: SDUPTHER

## 2021-01-05 RX ORDER — ATORVASTATIN CALCIUM 20 MG/1
20 TABLET, FILM COATED ORAL DAILY
Qty: 90 TABLET | Refills: 1 | Status: SHIPPED | OUTPATIENT
Start: 2021-01-05 | End: 2021-07-06 | Stop reason: SDUPTHER

## 2021-01-05 RX ORDER — TRIAMTERENE AND HYDROCHLOROTHIAZIDE 37.5; 25 MG/1; MG/1
0.5 TABLET ORAL DAILY
Qty: 45 TABLET | Refills: 1 | Status: SHIPPED | OUTPATIENT
Start: 2021-01-05 | End: 2021-07-06 | Stop reason: SDUPTHER

## 2021-01-05 ASSESSMENT — ENCOUNTER SYMPTOMS
DIARRHEA: 0
NAUSEA: 0
WHEEZING: 0
SHORTNESS OF BREATH: 0
ABDOMINAL PAIN: 0
BLOOD IN STOOL: 0
TROUBLE SWALLOWING: 0
BACK PAIN: 1
VOMITING: 0
CHEST TIGHTNESS: 0
EYE PAIN: 0

## 2021-01-05 ASSESSMENT — PATIENT HEALTH QUESTIONNAIRE - PHQ9
SUM OF ALL RESPONSES TO PHQ QUESTIONS 1-9: 0
SUM OF ALL RESPONSES TO PHQ QUESTIONS 1-9: 0

## 2021-01-05 NOTE — PROGRESS NOTES
1/5/2021    Jorge Sick    Chief Complaint   Patient presents with    6 Month Follow-Up    Arthritis     bilateral hand, worse left hand       HPI  History was obtained from the patient. Teresa Armstrong is a 69 y.o. female who presents today with follow-up on anxiety, hypertension, CKD 2, hyperlipidemia, GE reflux and osteoarthritis of spine. Patient does need lab work today she has been trying to socially distance is having some increased arthritic pain in hands and back. She remains active taking care of her own home and watching over her mother and her mother's home she does cooking for and laundry for her mother also. She had recent valuation by cardiology medications were titrated somewhat. Cardiac work-up was normal she been hospitalized for chest pain felt to be noncardiac. Overall she seems to be doing well other than ongoing stress from the Covid. We discussed the importance of getting a Covid vaccine and continue with social distancing. REVIEW OF SYMPTOMS    Review of Systems   Constitutional: Negative for activity change and fatigue. HENT: Negative for congestion, hearing loss, mouth sores and trouble swallowing. Eyes: Negative for pain and visual disturbance. Respiratory: Negative for chest tightness, shortness of breath and wheezing. Cardiovascular: Negative for chest pain and palpitations. Gastrointestinal: Negative for abdominal pain, blood in stool, diarrhea, nausea and vomiting. Endocrine: Negative. Genitourinary: Negative for dysuria, frequency and urgency. Musculoskeletal: Positive for arthralgias and back pain. Negative for gait problem and neck stiffness. Skin: Negative for rash. Allergic/Immunologic: Negative for environmental allergies. Neurological: Negative for dizziness, seizures, speech difficulty and weakness. Hematological: Does not bruise/bleed easily. Psychiatric/Behavioral: Negative for agitation, confusion and hallucinations.  The patient is nervous/anxious. PAST MEDICAL HISTORY  Past Medical History:   Diagnosis Date    Anxiety state     Arthritis     Benign essential HTN     Breast lump     Bunion of great toe of right foot     CKD (chronic kidney disease), stage II     Colon polyp 10-    Family history of early CAD     father had CABG in his 46s and 62s, brother had stroke and MI in his 76s. son had MI in 45s other son stroke at young age   [facility] GERD (gastroesophageal reflux disease)     History of nuclear stress test 07/07/2020    EF 71%. Normal study.     Hyperlipidemia     Internal hemorrhoids 10-    Irritable bowel syndrome     Nausea & vomiting     Osteoarthritis     Rosacea        FAMILY HISTORY  Family History   Problem Relation Age of Onset    Other Son 22        CROHNS       SOCIAL HISTORY  Social History     Socioeconomic History    Marital status:      Spouse name: Not on file    Number of children: Not on file    Years of education: Not on file    Highest education level: Not on file   Occupational History    Not on file   Social Needs    Financial resource strain: Not on file    Food insecurity     Worry: Not on file     Inability: Not on file    Transportation needs     Medical: Not on file     Non-medical: Not on file   Tobacco Use    Smoking status: Never Smoker    Smokeless tobacco: Never Used   Substance and Sexual Activity    Alcohol use: No    Drug use: No    Sexual activity: Yes     Partners: Male   Lifestyle    Physical activity     Days per week: Not on file     Minutes per session: Not on file    Stress: Not on file   Relationships    Social connections     Talks on phone: Not on file     Gets together: Not on file     Attends [employer]ars[employer] service: Not on file     Active member of club or organization: Not on file     Attends meetings of clubs or organizations: Not on file     Relationship status: Not on file    Intimate partner violence     Fear of current or ex partner: EXAM    BP (!) 140/76   Pulse 60   Temp 97.2 °F (36.2 °C)   Ht 5' 4\" (1.626 m)   Wt 159 lb (72.1 kg)   BMI 27.29 kg/m²     Physical Exam  Vitals signs and nursing note reviewed. Constitutional:       General: She is not in acute distress. Appearance: She is well-developed. She is not ill-appearing. HENT:      Head: Normocephalic and atraumatic. Nose: Nose normal.      Mouth/Throat:      Mouth: Mucous membranes are moist.      Pharynx: Oropharynx is clear. No posterior oropharyngeal erythema. Eyes:      Pupils: Pupils are equal, round, and reactive to light. Neck:      Musculoskeletal: Normal range of motion and neck supple. Cardiovascular:      Rate and Rhythm: Normal rate and regular rhythm. Heart sounds: Normal heart sounds. Pulmonary:      Effort: Pulmonary effort is normal.      Breath sounds: Normal breath sounds. Abdominal:      Palpations: Abdomen is soft. Musculoskeletal: Normal range of motion. General: No deformity. Skin:     General: Skin is warm and dry. Neurological:      General: No focal deficit present. Mental Status: She is alert and oriented to person, place, and time. Mental status is at baseline. Cranial Nerves: No cranial nerve deficit. Motor: No weakness. Gait: Gait normal.   Psychiatric:         Mood and Affect: Mood normal.         Behavior: Behavior normal.         Thought Content: Thought content normal.         ASSESSMENT & PLAN     Diagnosis Orders   1. Benign essential HTN  CBC    COMPREHENSIVE METABOLIC PANEL   2. Anxiety state     3. CKD (chronic kidney disease), stage II     4. Mixed hyperlipidemia  LIPID PANEL   5. Gastroesophageal reflux disease without esophagitis     6. Other osteoarthritis of spine, lumbar region     Today we will provide refills on meds. I suggested she try some over-the-counter Voltaren gel be applied to 3 times a day to painful areas of her hands.   If renal function has gone down on today's labs- will probably have to stop the nabumetone. She is only taking 1 a day at this point. We will check CBC ,CMP, and lipid panel- she is to follow-up for all these results. Pt advised to continue with healthy lifestyle, social distancing, and get Covid vaccine when available. She is to call with questions or issues. Return in about 6 months (around 7/5/2021).          Electronically signed by Jd Ignacio MD on 1/5/2021

## 2021-01-11 ENCOUNTER — OFFICE VISIT (OUTPATIENT)
Dept: FAMILY MEDICINE CLINIC | Age: 71
End: 2021-01-11
Payer: MEDICARE

## 2021-01-11 VITALS
DIASTOLIC BLOOD PRESSURE: 80 MMHG | WEIGHT: 158.4 LBS | SYSTOLIC BLOOD PRESSURE: 146 MMHG | HEART RATE: 57 BPM | TEMPERATURE: 97.7 F | BODY MASS INDEX: 27.04 KG/M2 | OXYGEN SATURATION: 92 % | HEIGHT: 64 IN

## 2021-01-11 DIAGNOSIS — Z00.00 ROUTINE GENERAL MEDICAL EXAMINATION AT A HEALTH CARE FACILITY: Primary | ICD-10-CM

## 2021-01-11 PROCEDURE — 4040F PNEUMOC VAC/ADMIN/RCVD: CPT | Performed by: FAMILY MEDICINE

## 2021-01-11 PROCEDURE — G8484 FLU IMMUNIZE NO ADMIN: HCPCS | Performed by: FAMILY MEDICINE

## 2021-01-11 PROCEDURE — G0439 PPPS, SUBSEQ VISIT: HCPCS | Performed by: FAMILY MEDICINE

## 2021-01-11 PROCEDURE — 3017F COLORECTAL CA SCREEN DOC REV: CPT | Performed by: FAMILY MEDICINE

## 2021-01-11 PROCEDURE — 1123F ACP DISCUSS/DSCN MKR DOCD: CPT | Performed by: FAMILY MEDICINE

## 2021-01-11 ASSESSMENT — LIFESTYLE VARIABLES
AUDIT TOTAL SCORE: INCOMPLETE
HOW OFTEN DO YOU HAVE A DRINK CONTAINING ALCOHOL: NEVER
HOW OFTEN DO YOU HAVE A DRINK CONTAINING ALCOHOL: 0
AUDIT-C TOTAL SCORE: INCOMPLETE

## 2021-01-11 ASSESSMENT — PATIENT HEALTH QUESTIONNAIRE - PHQ9
2. FEELING DOWN, DEPRESSED OR HOPELESS: 0
SUM OF ALL RESPONSES TO PHQ QUESTIONS 1-9: 0

## 2021-01-11 NOTE — PROGRESS NOTES
Medicare Annual Wellness Visit  Name: Kayla Godwin Date: 2021   MRN: [id number] Sex: Female   Age: 69 y.o. Ethnicity: Non-/Non    : 1950 Race: Vincent Mari is here for Medicare AWV    Screenings for behavioral, psychosocial and functional/safety risks, and cognitive dysfunction are all negative except as indicated below. These results, as well as other patient data from the [address] form, are documented in Flowsheets linked to this Encounter. No Known Allergies      Prior to Visit Medications    Medication Sig Taking? Authorizing Provider   atorvastatin (LIPITOR) 20 MG tablet Take 1 tablet by mouth daily Yes Darin Estrella MD   nabumetone (RELAFEN) 500 MG tablet Take 1 tablet by mouth 2 times daily Yes Darin Estrella MD   triamterene-hydroCHLOROthiazide [facility]) 37.5-25 MG per tablet Take 0.5 tablets by mouth daily Yes Darin Estrella MD   isosorbide mononitrate (IMDUR) 30 MG extended release tablet Take 1 tablet by mouth daily Yes Anna Aguilar MD   aspirin 81 MG EC tablet Take 1 tablet by mouth daily Yes Anna Aguilar MD   nitroGLYCERIN (NITROSTAT) 0.4 MG SL tablet up to max of 3 total doses. If no relief after 1 dose, call 911.  Yes Tricia Calvillo MD   DENTA 5000 PLUS 1.1 % CREA Take 1 Dose by mouth nightly Yes Historical Provider, MD   Omega-3 Fatty Acids (FISH OIL) 1000 MG CAPS Take 3,000 mg by mouth 3 times daily Yes Historical Provider, MD   Calcium Carbonate-Vitamin D (CALCIUM 600+D HIGH POTENCY PO) Take by mouth Yes Historical Provider, MD   Multiple Vitamins-Minerals (HAIR SKIN AND NAILS FORMULA PO) Take by mouth Yes Historical Provider, MD   simethicone (MYLICON) 80 MG chewable tablet Take 80 mg by mouth every 6 hours as needed for Flatulence Yes Historical Provider, MD   Lactase (LACTAID PO) Take by mouth Yes Historical Provider, MD         Past Medical History:   Diagnosis Date    Anxiety state     Arthritis  Benign essential HTN     Breast lump     Bunion of great toe of right foot     CKD (chronic kidney disease), stage II     Colon polyp 10-    Family history of early CAD     father had CABG in his 46s and 62s, brother had stroke and MI in his 76s. son had MI in 45s other son stroke at young age   [facility] GERD (gastroesophageal reflux disease)     History of nuclear stress test 07/07/2020    EF 71%. Normal study.  Hyperlipidemia     Internal hemorrhoids 10-    Irritable bowel syndrome     Nausea & vomiting     Osteoarthritis     Rosacea        Past Surgical History:   Procedure Laterality Date    APPENDECTOMY  1963    BREAST SURGERY  2002    BIOPSY    BUNIONECTOMY Left     [address]HETERIZATION  1988   [address]    COLONOSCOPY  04/25/2018    DIAGNOSTIC CARDIAC CATH LAB PROCEDURE      FOOT SURGERY      Multiple Foot surgeries including pin in ankle and Heel Spurs    FOOT TENDON SURGERY      TUBAL LIGATION  1982    TUMOR REMOVAL      NECK X2         Family History   Problem Relation Age of Onset    Other Son 22        CROHNS       CareTeam (Including outside providers/suppliers regularly involved in providing care):   Patient Care Team:  Minna Muhammad MD as PCP - General  Minna Muhammad MD as PCP - [facility] Empaneled Provider    Wt Readings from Last 3 Encounters:   01/11/21 158 lb 6.4 oz (71.8 kg)   01/05/21 159 lb (72.1 kg)   07/21/20 167 lb (75.8 kg)     Vitals:    01/11/21 1027   BP: (!) 146/80   Site: Right Upper Arm   Position: Sitting   Cuff Size: Medium Adult   Pulse: 57   Temp: 97.7 °F (36.5 °C)   SpO2: 92%   Weight: 158 lb 6.4 oz (71.8 kg)   Height: 5' 4\" (1.626 m)     Body mass index is 27.19 kg/m². Based upon direct observation of the patient, evaluation of cognition reveals recent and remote memory intact.         Patient's complete Health Risk Assessment and screening values have been reviewed and are found in Flowsheets. The following problems were reviewed today and where indicated follow up appointments were made and/or referrals ordered.     Positive Risk Factor Screenings with Interventions:           Health Habits/Nutrition:  Health Habits/Nutrition  Do you exercise for at least 20 minutes 2-3 times per week?: (!) No  Have you lost any weight without trying in the past 3 months?: (!) Yes  Do you eat fewer than 2 meals per day?: No  Have you seen a dentist within the past year?: Yes  Body mass index: (!) 27.19  Health Habits/Nutrition Interventions:  · work on incr exercise     Safety:  Safety  Do you have working smoke detectors?: Yes  Have all throw rugs been removed or fastened?: (!) No  Do you have non-slip mats or surfaces in all bathtubs/showers?: Yes  Do all of your stairways have a railing or banister?: (!) No  Are your doorways, halls and stairs free of clutter?: Yes  Do you always fasten your seatbelt when you are in a car?: Yes  Safety Interventions:  · Home safety tips provided     Personalized Preventive Plan   Current Health Maintenance Status  Immunization History   Administered Date(s) Administered    Influenza, High Dose (Fluzone 65 yrs and older) 10/05/2016, 10/12/2017, 09/20/2018, 10/02/2019    Pneumococcal Conjugate 13-valent ([id number]) 08/05/2015    Pneumococcal Polysaccharide ([id number]) 06/04/2019    Tdap (Boostrix, Adacel) 08/08/2016    Zoster Live (Zostavax) 08/31/2018, 11/27/2018    Zoster Recombinant (Shingrix) 08/31/2018, 11/27/2018        Health Maintenance   Topic Date Due    Hepatitis C screen  1950    Shingles Vaccine (2 of 3) 01/22/2019    Annual Wellness Visit (AWV)  06/04/2019    Lipid screen  01/05/2022    Potassium monitoring  01/05/2022    Creatinine monitoring  01/05/2022    Breast cancer screen  12/08/2022    DTaP/Tdap/Td vaccine (2 - Td) 08/08/2026    Colon cancer screen colonoscopy  04/25/2028    DEXA (modify frequency per FRAX score)  Completed    Flu vaccine  Completed    Pneumococcal 65+ years Vaccine  Completed    Hepatitis A vaccine  Aged Out    Hepatitis B vaccine  Aged Out    Hib vaccine  Aged Out    Meningococcal (ACWY) vaccine  Aged Out     Recommendations for [employer] Due: see orders and patient instructions/AVS.  . Recommended screening schedule for the next 5-10 years is provided to the patient in written form: see Patient Instructions/AVS.  incr activity and get Covid vaccine! There are no diagnoses linked to this encounter.

## 2021-01-26 ENCOUNTER — TELEMEDICINE (OUTPATIENT)
Dept: CARDIOLOGY CLINIC | Age: 71
End: 2021-01-26
Payer: MEDICARE

## 2021-01-26 VITALS — DIASTOLIC BLOOD PRESSURE: 76 MMHG | SYSTOLIC BLOOD PRESSURE: 132 MMHG | HEART RATE: 64 BPM

## 2021-01-26 DIAGNOSIS — Z82.49 FAMILY HISTORY OF EARLY CAD: ICD-10-CM

## 2021-01-26 DIAGNOSIS — I20.8 OTHER FORMS OF ANGINA PECTORIS (HCC): ICD-10-CM

## 2021-01-26 DIAGNOSIS — F41.1 ANXIETY STATE: ICD-10-CM

## 2021-01-26 DIAGNOSIS — E78.00 PURE HYPERCHOLESTEROLEMIA: ICD-10-CM

## 2021-01-26 DIAGNOSIS — I10 BENIGN ESSENTIAL HTN: Primary | ICD-10-CM

## 2021-01-26 PROCEDURE — 99213 OFFICE O/P EST LOW 20 MIN: CPT | Performed by: INTERNAL MEDICINE

## 2021-01-26 RX ORDER — ISOSORBIDE MONONITRATE 30 MG/1
30 TABLET, EXTENDED RELEASE ORAL DAILY
Qty: 90 TABLET | Refills: 3 | Status: SHIPPED | OUTPATIENT
Start: 2021-01-26 | End: 2022-01-24 | Stop reason: SDUPTHER

## 2021-01-26 NOTE — ASSESSMENT & PLAN NOTE
Atypical  by description. She had negative noninvasive work-up last year. Continue current medical management.

## 2021-01-26 NOTE — PROGRESS NOTES
Clark Yates (:  1950) is a 69 y.o. female,Established patient, here for evaluation of the following chief complaint(s): Hypertension (Pt denies any new cardiac concerns. Pt is non-smoker.) and Hyperlipidemia      Prior to Admission medications    Medication Sig Start Date End Date Taking? Authorizing Provider   isosorbide mononitrate (IMDUR) 30 MG extended release tablet Take 1 tablet by mouth daily 21  Yes Brittany Clarke MD   atorvastatin (LIPITOR) 20 MG tablet Take 1 tablet by mouth daily 21  Yes Lorna Lennox, MD   nabumetone (RELAFEN) 500 MG tablet Take 1 tablet by mouth 2 times daily  Patient taking differently: Take 500 mg by mouth daily  21  Yes Lorna Lennox, MD   triamterene-hydroCHLOROthiazide [facility]) 37.5-25 MG per tablet Take 0.5 tablets by mouth daily 21  Yes Lorna Lennox, MD   aspirin 81 MG EC tablet Take 1 tablet by mouth daily 20  Yes Brittany Clarke MD   nitroGLYCERIN (NITROSTAT) 0.4 MG SL tablet up to max of 3 total doses.  If no relief after 1 dose, call 911. 20  Yes Marie Lee MD   DENTA 5000 PLUS 1.1 % CREA Take 1 Dose by mouth nightly 10/14/19  Yes Historical Provider, MD   Omega-3 Fatty Acids (FISH OIL) 1000 MG CAPS Take 3,000 mg by mouth 3 times daily   Yes Historical Provider, MD   Calcium Carbonate-Vitamin D (CALCIUM 600+D HIGH POTENCY PO) Take by mouth   Yes Historical Provider, MD   Multiple Vitamins-Minerals (HAIR SKIN AND NAILS FORMULA PO) Take by mouth   Yes Historical Provider, MD   simethicone (MYLICON) 80 MG chewable tablet Take 80 mg by mouth every 6 hours as needed for Flatulence   Yes Historical Provider, MD   Lactase (LACTAID PO) Take by mouth as needed    Yes Historical Provider, MD     No Known Allergies    SUBJECTIVE/OBJECTIVE:    HPI 76-year-old female follows up for history of atypical chest pains and has chronic hypertension and anxiety and family history of premature coronary artery disease and history of hyperlipidemia on statin therapy. She had only one episode of chest pain lasting only for few seconds. He does have anxiety and reads book to calm down. She walks on the treadmill when the weather is good as a treadmill is not in the house and it is in the garage and she does floor exercises and stretching when she could not go out in the garage to walk. She has been compliant with medications. She does not smoke. Review of Systems: Denies any palpitations syncope near syncope TIA or CVA or claudication. Patient-Reported Vitals 1/26/2021   Patient-Reported Weight 157lbs   Patient-Reported Height 5'4\"   Patient-Reported Systolic 739   Patient-Reported Diastolic 75   Patient-Reported Pulse 57   Repeat blood pressure was 132/64 and heart rate 60 bpm.  Reports she was anxious with the first reading. Patient reports she has lost 10 pounds since last visit 6 months ago. Physical Exam  Lab Results   Component Value Date    WBC 6.6 01/05/2021    HGB 12.4 01/05/2021    HCT 35.8 01/05/2021     01/05/2021     Lab Results   Component Value Date    CHOL 176 01/05/2021    TRIG 170 (H) 01/05/2021    HDL 62 (H) 01/05/2021    LDLCALC 80 01/05/2021     Lab Results   Component Value Date    BUN 16 01/05/2021    CREATININE 1.0 01/05/2021     01/05/2021    K 4.4 01/05/2021     ASSESSMENT/PLAN:  1. Benign essential HTN  Assessment & Plan:  Fairly well-controlled on current medications. Continue the same. 2. Other forms of angina pectoris [facility])  Assessment & Plan:  Atypical  by description. She had negative noninvasive work-up last year. Continue current medical management. 3. Anxiety state  Assessment & Plan:  Counseled for stress management and follow-up with PCP. 4. Pure hypercholesterolemia  Assessment & Plan:  LDL reported is 80. Continue current statin therapy.   5. Family history of early CAD  Assessment & Plan:  Counseled to continue aggressive risk factor modification for primary prevention. On this date 01/26/21 I have spent 20 minutes reviewing previous notes, test results and face to face with the patient discussing the diagnosis and importance of compliance with the treatment plan as well as documenting on the day of the visit. Yulia Watkins is a 69 y.o. female being evaluated by a Virtual Visit (video visit) encounter to address concerns as mentioned above. A caregiver was present when appropriate. Due to this being a TeleHealth encounter (During [facility]- public health emergency), evaluation of the following organ systems was limited: Vitals/Constitutional/EENT/Resp/CV/GI//MS/Neuro/Skin/Heme-Lymph-Imm. Pursuant to the emergency declaration under the [address] [place] [address] authority and the [employer] Act, this Virtual Visit was conducted with patient's (and/or legal guardian's) consent, to reduce the patient's risk of exposure to COVID-19 and provide necessary medical care. The patient (and/or legal guardian) has also been advised to contact this office for worsening conditions or problems, and seek emergency medical treatment and/or call 911 if deemed necessary. Patient identification was verified at the start of the visit: Yes      Services were provided through synchronized video discussion virtually to substitute for in-person clinic visit. Patient and provider were located at their individual homes. An electronic signature was used to authenticate this note.     --Zelda Peabody, MD

## 2021-07-06 ENCOUNTER — OFFICE VISIT (OUTPATIENT)
Dept: FAMILY MEDICINE CLINIC | Age: 71
End: 2021-07-06
Payer: MEDICARE

## 2021-07-06 VITALS
HEART RATE: 53 BPM | SYSTOLIC BLOOD PRESSURE: 138 MMHG | BODY MASS INDEX: 26.98 KG/M2 | WEIGHT: 158 LBS | DIASTOLIC BLOOD PRESSURE: 76 MMHG | HEIGHT: 64 IN | OXYGEN SATURATION: 99 %

## 2021-07-06 DIAGNOSIS — M54.12 CERVICAL RADICULOPATHY: Primary | ICD-10-CM

## 2021-07-06 DIAGNOSIS — I10 BENIGN ESSENTIAL HTN: ICD-10-CM

## 2021-07-06 DIAGNOSIS — N18.2 CKD (CHRONIC KIDNEY DISEASE), STAGE II: ICD-10-CM

## 2021-07-06 DIAGNOSIS — F41.1 ANXIETY STATE: ICD-10-CM

## 2021-07-06 DIAGNOSIS — E78.00 PURE HYPERCHOLESTEROLEMIA: ICD-10-CM

## 2021-07-06 LAB
ANION GAP SERPL CALCULATED.3IONS-SCNC: 12 MMOL/L (ref 3–16)
BUN BLDV-MCNC: 17 MG/DL (ref 7–20)
CALCIUM SERPL-MCNC: 9.5 MG/DL (ref 8.3–10.6)
CHLORIDE BLD-SCNC: 103 MMOL/L (ref 99–110)
CO2: 26 MMOL/L (ref 21–32)
CREAT SERPL-MCNC: 1.2 MG/DL (ref 0.6–1.2)
GFR AFRICAN AMERICAN: 53
GFR NON-AFRICAN AMERICAN: 44
GLUCOSE BLD-MCNC: 89 MG/DL (ref 70–99)
POTASSIUM SERPL-SCNC: 4.5 MMOL/L (ref 3.5–5.1)
SODIUM BLD-SCNC: 141 MMOL/L (ref 136–145)

## 2021-07-06 PROCEDURE — 99214 OFFICE O/P EST MOD 30 MIN: CPT | Performed by: FAMILY MEDICINE

## 2021-07-06 RX ORDER — TRIAMTERENE AND HYDROCHLOROTHIAZIDE 37.5; 25 MG/1; MG/1
0.5 TABLET ORAL DAILY
Qty: 45 TABLET | Refills: 1 | Status: SHIPPED | OUTPATIENT
Start: 2021-07-06 | End: 2022-01-06 | Stop reason: SDUPTHER

## 2021-07-06 RX ORDER — NABUMETONE 500 MG/1
500 TABLET, FILM COATED ORAL DAILY
Qty: 90 TABLET | Refills: 1 | Status: CANCELLED | OUTPATIENT
Start: 2021-07-06

## 2021-07-06 RX ORDER — ATORVASTATIN CALCIUM 20 MG/1
20 TABLET, FILM COATED ORAL DAILY
Qty: 90 TABLET | Refills: 1 | Status: SHIPPED | OUTPATIENT
Start: 2021-07-06 | End: 2022-01-06 | Stop reason: SDUPTHER

## 2021-07-06 RX ORDER — METHYLPREDNISOLONE 4 MG/1
TABLET ORAL
Qty: 1 KIT | Refills: 0 | Status: SHIPPED | OUTPATIENT
Start: 2021-07-06 | End: 2021-07-12

## 2021-07-06 RX ORDER — NABUMETONE 500 MG/1
500 TABLET, FILM COATED ORAL DAILY
Qty: 30 TABLET | Refills: 3 | Status: SHIPPED | OUTPATIENT
Start: 2021-07-06 | End: 2022-01-06

## 2021-07-06 ASSESSMENT — ENCOUNTER SYMPTOMS
VOMITING: 0
CHEST TIGHTNESS: 0
NAUSEA: 0
TROUBLE SWALLOWING: 0
EYE PAIN: 0
BLOOD IN STOOL: 0
DIARRHEA: 0
SHORTNESS OF BREATH: 0
ABDOMINAL PAIN: 0
WHEEZING: 0

## 2021-07-06 NOTE — PROGRESS NOTES
7/6/2021    Alireza Rae    Chief Complaint   Patient presents with    6 Month Follow-Up    Arthritis     getting worse recently, shoulders/arms. wakes up in the night       HPI  History was obtained from the patient. Selvin Higuera is a 69 y.o. female who presents today with history of hypertension hyperlipidemia, CKD 2, anxiety, GERD, and osteoarthritis. In addition she is having increasing arthritic symptoms in her hands and is having some paresthesia in her right upper extremity. Last labs were January 2021. She does have radicular type symptoms does get little bit of relief with elevation of the right upper extremity. Anxiety is unchanged GERD symptoms are controlled meds are tolerated. Blood pressures are reasonable. REVIEW OF SYMPTOMS    Review of Systems   Constitutional: Negative for activity change and fatigue. HENT: Negative for congestion, hearing loss, mouth sores and trouble swallowing. Eyes: Negative for pain and visual disturbance. Respiratory: Negative for chest tightness, shortness of breath and wheezing. Cardiovascular: Negative for chest pain and palpitations. Gastrointestinal: Negative for abdominal pain, blood in stool, diarrhea, nausea and vomiting. Endocrine: Negative. Genitourinary: Negative for dysuria, frequency and urgency. Musculoskeletal: Positive for arthralgias and neck pain. Negative for gait problem and neck stiffness. Skin: Negative for rash. Allergic/Immunologic: Negative for environmental allergies. Neurological: Positive for numbness. Negative for dizziness, seizures, speech difficulty and weakness. Patient with right cervical radicular symptoms. Hematological: Does not bruise/bleed easily. Psychiatric/Behavioral: Negative for agitation, confusion, hallucinations and suicidal ideas. The patient is nervous/anxious.         PAST MEDICAL HISTORY  Past Medical History:   Diagnosis Date    Anxiety state     Arthritis     Benign essential HTN  Breast lump     Bunion of great toe of right foot     CKD (chronic kidney disease), stage II     Colon polyp 10-    Family history of early CAD     father had CABG in his 46s and 62s, brother had stroke and MI in his 76s. son had MI in 45s other son stroke at young age   [facility] GERD (gastroesophageal reflux disease)     History of nuclear stress test 07/07/2020    EF 71%. Normal study.  Hyperlipidemia     Internal hemorrhoids 10-    Irritable bowel syndrome     Nausea & vomiting     Osteoarthritis     Rosacea        FAMILY HISTORY  Family History   Problem Relation Age of Onset    Other Son 22        CROHNS       SOCIAL HISTORY  Social History     Socioeconomic History    Marital status:      Spouse name: None    Number of children: None    Years of education: None    Highest education level: None   Occupational History    None   Tobacco Use    Smoking status: Never Smoker    Smokeless tobacco: Never Used   Vaping Use    Vaping Use: Never used   Substance and Sexual Activity    Alcohol use: No    Drug use: No    Sexual activity: Yes     Partners: Male   Other Topics Concern    None   Social History Narrative    None     Social Determinants of Health     Financial Resource Strain:     Difficulty of Paying Living Expenses:    Food Insecurity:     Worried About Running Out of Food in the Last Year:     Ran Out of Food in the Last Year:    Transportation Needs:     Lack of Transportation (Medical):      Lack of Transportation (Non-Medical):    Physical Activity:     Days of Exercise per Week:     Minutes of Exercise per Session:    Stress:     Feeling of Stress :    Social Connections:     Frequency of Communication with Friends and Family:     Frequency of Social Gatherings with Friends and Family:     Attends Advent Services:     Active Member of Clubs or Organizations:     Attends Club or Organization Meetings:     Marital Status:    Intimate Partner Violence:     Fear of Current or Ex-Partner:     Emotionally Abused:     Physically Abused:     Sexually Abused:         SURGICAL HISTORY  Past Surgical History:   Procedure Laterality Date    APPENDECTOMY  1963    BREAST SURGERY  2002    BIOPSY    BUNIONECTOMY Left     [address]TERIZATION  [address]    COLONOSCOPY  04/25/2018    DIAGNOSTIC CARDIAC CATH LAB PROCEDURE      FOOT SURGERY      Multiple Foot surgeries including pin in ankle and Heel Spurs    FOOT TENDON SURGERY      TUBAL LIGATION  1982    TUMOR REMOVAL      NECK X2                 CURRENT MEDICATIONS  Current Outpatient Medications   Medication Sig Dispense Refill    atorvastatin (LIPITOR) 20 MG tablet Take 1 tablet by mouth daily 90 tablet 1    triamterene-hydroCHLOROthiazide (MAXZIDE-25) 37.5-25 MG per tablet Take 0.5 tablets by mouth daily 45 tablet 1    methylPREDNISolone (MEDROL DOSEPACK) 4 MG tablet Take by mouth. 1 kit 0    nabumetone (RELAFEN) 500 MG tablet Take 1 tablet by mouth daily 30 tablet 3    isosorbide mononitrate (IMDUR) 30 MG extended release tablet Take 1 tablet by mouth daily 90 tablet 3    aspirin 81 MG EC tablet Take 1 tablet by mouth daily 90 tablet 3    nitroGLYCERIN (NITROSTAT) 0.4 MG SL tablet up to max of 3 total doses. If no relief after 1 dose, call 911. 25 tablet 3    Omega-3 Fatty Acids (FISH OIL) 1000 MG CAPS Take 3,000 mg by mouth 3 times daily      Calcium Carbonate-Vitamin D (CALCIUM 600+D HIGH POTENCY PO) Take by mouth      Multiple Vitamins-Minerals (HAIR SKIN AND NAILS FORMULA PO) Take by mouth      simethicone (MYLICON) 80 MG chewable tablet Take 80 mg by mouth every 6 hours as needed for Flatulence      Lactase (LACTAID PO) Take by mouth as needed       DENTA 5000 PLUS 1.1 % CREA Take 1 Dose by mouth nightly  3     No current facility-administered medications for this visit.        ALLERGIES  No Known Allergies    PHYSICAL EXAM    BP 138/76 (Site: Right Upper Arm, Position: Sitting, Cuff Size: Medium Adult)   Pulse 53   Ht 5' 4\" (1.626 m)   Wt 158 lb (71.7 kg)   SpO2 99%   BMI 27.12 kg/m²     Physical Exam  Vitals and nursing note reviewed. Constitutional:       General: She is not in acute distress. Appearance: She is well-developed. She is not ill-appearing or toxic-appearing. HENT:      Head: Normocephalic and atraumatic. Nose: Nose normal.      Mouth/Throat:      Mouth: Mucous membranes are moist.      Pharynx: Oropharynx is clear. Eyes:      Pupils: Pupils are equal, round, and reactive to light. Cardiovascular:      Rate and Rhythm: Normal rate and regular rhythm. Heart sounds: Normal heart sounds. No murmur heard. No friction rub. No gallop. Pulmonary:      Effort: Pulmonary effort is normal. No respiratory distress. Breath sounds: Normal breath sounds. No wheezing, rhonchi or rales. Abdominal:      Palpations: Abdomen is soft. Musculoskeletal:         General: No swelling or deformity. Normal range of motion. Cervical back: Normal range of motion and neck supple. Skin:     General: Skin is warm and dry. Neurological:      General: No focal deficit present. Mental Status: She is alert and oriented to person, place, and time. Mental status is at baseline. Cranial Nerves: No cranial nerve deficit. Motor: No weakness. Psychiatric:         Mood and Affect: Mood normal.         Behavior: Behavior normal.         Thought Content: Thought content normal.         ASSESSMENT & PLAN     Diagnosis Orders   1. Cervical radiculopathy  XR CERVICAL SPINE FLEXION AND EXTENSION   2. Benign essential HTN  BASIC METABOLIC PANEL   3. Pure hypercholesterolemia     4. CKD (chronic kidney disease), stage II  BASIC METABOLIC PANEL   5. Anxiety state      At this point discussion carried out & encouragement given. Provide refills on meds as needed and we will check C-spine x-rays.  She will be treated with a Medrol 4 mg Dosepak  And is to hold any anti-inflammatory while she is on the 300 [address]. Also recheck a BMP to keep an eye on her kidney filtration. Return in about 6 months (around 1/6/2022).          Electronically signed by John Delgadillo MD on 7/6/2021

## 2021-07-07 ENCOUNTER — TELEPHONE (OUTPATIENT)
Dept: FAMILY MEDICINE CLINIC | Age: 71
End: 2021-07-07

## 2021-07-07 ENCOUNTER — HOSPITAL ENCOUNTER (OUTPATIENT)
Age: 71
Discharge: HOME | End: 2021-07-07
Payer: MEDICARE

## 2021-07-07 ENCOUNTER — HOSPITAL ENCOUNTER (OUTPATIENT)
Dept: GENERAL RADIOLOGY | Age: 71
Discharge: HOME | End: 2021-07-07
Payer: MEDICARE

## 2021-07-07 DIAGNOSIS — M54.12 CERVICAL RADICULOPATHY: ICD-10-CM

## 2021-07-07 DIAGNOSIS — N18.2 CKD (CHRONIC KIDNEY DISEASE), STAGE II: Primary | ICD-10-CM

## 2021-07-07 PROCEDURE — 72050 X-RAY EXAM NECK SPINE 4/5VWS: CPT

## 2021-07-07 NOTE — TELEPHONE ENCOUNTER
Pt with mild to moderate OA changes of C-spine with sl movement of one vert on the other. P: observe symptoms with medication and if symptoms persist-may consider PT, etc

## 2021-07-08 ENCOUNTER — TELEPHONE (OUTPATIENT)
Dept: FAMILY MEDICINE CLINIC | Age: 71
End: 2021-07-08

## 2021-07-08 DIAGNOSIS — M54.12 CERVICAL RADICULOPATHY: Primary | ICD-10-CM

## 2021-07-08 RX ORDER — GABAPENTIN 100 MG/1
100 CAPSULE ORAL NIGHTLY PRN
Qty: 20 CAPSULE | Refills: 1 | Status: SHIPPED | OUTPATIENT
Start: 2021-07-08 | End: 2022-01-06

## 2021-07-08 NOTE — TELEPHONE ENCOUNTER
Okay to stay off the Medrol Dosepak use Benadryl if needed. If she still having symptoms of pinched nerve we could switch to just gabapentin 100 mg in the evening to see if that might help she wants to try that give her 20 with 1 refill. Incidentally all labs were good except GFR has been slightly low that is why we spoke to her yesterday via [facility] to push fluids and avoid nonsteroidal.  And have a recheck in the next few weeks.

## 2021-07-08 NOTE — TELEPHONE ENCOUNTER
Work up this morning and face and neck were all red. No pain, itching, or trouble breathing. Thinks it could possibly be the medrol dose pack she started couple days ago. Advised to stop the medrol dose pack, if starts to have any trouble breathing or strange feelings to go to the ER to get checked out, and to take some benedryl as directed on the bottle to help with the redness. States the medicine did not help with the arm/shoulder pain much.   Do we want to send anything else in?

## 2021-07-28 DIAGNOSIS — N18.2 CKD (CHRONIC KIDNEY DISEASE), STAGE II: ICD-10-CM

## 2021-07-28 LAB
ANION GAP SERPL CALCULATED.3IONS-SCNC: 12 MMOL/L (ref 3–16)
BUN BLDV-MCNC: 16 MG/DL (ref 7–20)
CALCIUM SERPL-MCNC: 9.4 MG/DL (ref 8.3–10.6)
CHLORIDE BLD-SCNC: 104 MMOL/L (ref 99–110)
CO2: 26 MMOL/L (ref 21–32)
CREAT SERPL-MCNC: 0.9 MG/DL (ref 0.6–1.2)
GFR AFRICAN AMERICAN: >60
GFR NON-AFRICAN AMERICAN: >60
GLUCOSE BLD-MCNC: 86 MG/DL (ref 70–99)
POTASSIUM SERPL-SCNC: 4.4 MMOL/L (ref 3.5–5.1)
SODIUM BLD-SCNC: 142 MMOL/L (ref 136–145)

## 2021-09-29 RX ORDER — NITROGLYCERIN 0.4 MG/1
TABLET SUBLINGUAL
Qty: 25 TABLET | Refills: 3 | Status: SHIPPED | OUTPATIENT
Start: 2021-09-29

## 2021-09-29 NOTE — TELEPHONE ENCOUNTER
Patient states nitro prescription has  and is requesting new prescription be sent to [facility]. States she has not needed to use, but just noticed it is now . Will pend prescription to Dr Nancy Caro to review and approve.

## 2022-01-05 ASSESSMENT — LIFESTYLE VARIABLES
HOW OFTEN DO YOU HAVE A DRINK CONTAINING ALCOHOL: NEVER
AUDIT-C TOTAL SCORE: INCOMPLETE
AUDIT TOTAL SCORE: INCOMPLETE
HOW OFTEN DO YOU HAVE A DRINK CONTAINING ALCOHOL: 0

## 2022-01-05 ASSESSMENT — PATIENT HEALTH QUESTIONNAIRE - PHQ9
SUM OF ALL RESPONSES TO PHQ9 QUESTIONS 1 & 2: 0
SUM OF ALL RESPONSES TO PHQ QUESTIONS 1-9: 0
SUM OF ALL RESPONSES TO PHQ QUESTIONS 1-9: 0
1. LITTLE INTEREST OR PLEASURE IN DOING THINGS: 0
SUM OF ALL RESPONSES TO PHQ QUESTIONS 1-9: 0
2. FEELING DOWN, DEPRESSED OR HOPELESS: 0
SUM OF ALL RESPONSES TO PHQ QUESTIONS 1-9: 0

## 2022-01-06 ENCOUNTER — OFFICE VISIT (OUTPATIENT)
Dept: FAMILY MEDICINE CLINIC | Age: 72
End: 2022-01-06
Payer: MEDICARE

## 2022-01-06 ENCOUNTER — TELEPHONE (OUTPATIENT)
Dept: FAMILY MEDICINE CLINIC | Age: 72
End: 2022-01-06

## 2022-01-06 VITALS
SYSTOLIC BLOOD PRESSURE: 164 MMHG | BODY MASS INDEX: 26.8 KG/M2 | HEART RATE: 65 BPM | WEIGHT: 157 LBS | OXYGEN SATURATION: 99 % | DIASTOLIC BLOOD PRESSURE: 80 MMHG | HEIGHT: 64 IN

## 2022-01-06 DIAGNOSIS — Z11.59 NEED FOR HEPATITIS C SCREENING TEST: ICD-10-CM

## 2022-01-06 DIAGNOSIS — E78.00 PURE HYPERCHOLESTEROLEMIA: ICD-10-CM

## 2022-01-06 DIAGNOSIS — M47.896 OTHER OSTEOARTHRITIS OF SPINE, LUMBAR REGION: ICD-10-CM

## 2022-01-06 DIAGNOSIS — N18.2 CKD (CHRONIC KIDNEY DISEASE), STAGE II: ICD-10-CM

## 2022-01-06 DIAGNOSIS — F41.1 ANXIETY STATE: ICD-10-CM

## 2022-01-06 DIAGNOSIS — I10 BENIGN ESSENTIAL HTN: ICD-10-CM

## 2022-01-06 DIAGNOSIS — I10 BENIGN ESSENTIAL HTN: Primary | ICD-10-CM

## 2022-01-06 DIAGNOSIS — M62.838 MUSCLE SPASM: ICD-10-CM

## 2022-01-06 LAB
A/G RATIO: 2.1 (ref 1.1–2.2)
ALBUMIN SERPL-MCNC: 4.5 G/DL (ref 3.4–5)
ALP BLD-CCNC: 106 U/L (ref 40–129)
ALT SERPL-CCNC: 17 U/L (ref 10–40)
ANION GAP SERPL CALCULATED.3IONS-SCNC: 13 MMOL/L (ref 3–16)
AST SERPL-CCNC: 21 U/L (ref 15–37)
BILIRUB SERPL-MCNC: 0.8 MG/DL (ref 0–1)
BUN BLDV-MCNC: 16 MG/DL (ref 7–20)
CALCIUM SERPL-MCNC: 10 MG/DL (ref 8.3–10.6)
CHLORIDE BLD-SCNC: 103 MMOL/L (ref 99–110)
CHOLESTEROL, TOTAL: 152 MG/DL (ref 0–199)
CO2: 27 MMOL/L (ref 21–32)
CREAT SERPL-MCNC: 1.1 MG/DL (ref 0.6–1.2)
GFR AFRICAN AMERICAN: 59
GFR NON-AFRICAN AMERICAN: 49
GLUCOSE BLD-MCNC: 92 MG/DL (ref 70–99)
HDLC SERPL-MCNC: 72 MG/DL (ref 40–60)
HEPATITIS C ANTIBODY INTERPRETATION: NORMAL
LDL CHOLESTEROL CALCULATED: 56 MG/DL
POTASSIUM SERPL-SCNC: 4.2 MMOL/L (ref 3.5–5.1)
SODIUM BLD-SCNC: 143 MMOL/L (ref 136–145)
TOTAL PROTEIN: 6.6 G/DL (ref 6.4–8.2)
TRIGL SERPL-MCNC: 120 MG/DL (ref 0–150)
VLDLC SERPL CALC-MCNC: 24 MG/DL

## 2022-01-06 PROCEDURE — 99214 OFFICE O/P EST MOD 30 MIN: CPT | Performed by: FAMILY MEDICINE

## 2022-01-06 RX ORDER — TRIAMTERENE AND HYDROCHLOROTHIAZIDE 37.5; 25 MG/1; MG/1
0.5 TABLET ORAL DAILY
Qty: 45 TABLET | Refills: 1 | Status: SHIPPED | OUTPATIENT
Start: 2022-01-06 | End: 2022-07-06

## 2022-01-06 RX ORDER — ATORVASTATIN CALCIUM 20 MG/1
20 TABLET, FILM COATED ORAL DAILY
Qty: 90 TABLET | Refills: 1 | Status: SHIPPED | OUTPATIENT
Start: 2022-01-06 | End: 2022-07-06

## 2022-01-06 ASSESSMENT — ENCOUNTER SYMPTOMS
WHEEZING: 0
DIARRHEA: 0
EYE PAIN: 0
TROUBLE SWALLOWING: 0
CHEST TIGHTNESS: 0
SHORTNESS OF BREATH: 0
BLOOD IN STOOL: 0
ABDOMINAL PAIN: 0
VOMITING: 0
NAUSEA: 0

## 2022-01-06 NOTE — TELEPHONE ENCOUNTER
Patient called and requested the mg for magnesium supplement she was to get OTC  Call patient and advise

## 2022-01-06 NOTE — PROGRESS NOTES
1/6/2022    Gregg Mishra    Chief Complaint   Patient presents with    6 Month Follow-Up    Medication Problem     did not take gabapentin, made her feel sick/dizzy    Immunizations     questions on shingles vaccine, had 2, epic showing 3 needed. HPI  History was obtained from the patient. Roberta Menjivar is a 66 y.o. female who presents today with routine follow-up on hypertension, hyperlipidemia, CKD 2, anxiety, and osteoarthritis. Patient is actually been remaining remaining active and without complaint. She could not tolerate the gabapentin and that was discontinued because of the dizziness and nausea. On review of immunizations she is up-to-date but does need a Covid booster. Pressure was up a bit today but on recheck it was 140/78. She normally gets in the 125-135/70 range at home. Mood remains a positive. Only issue of the ordinary is some increased muscle spasming we discussed this she does need to drink more fluids during the day consider taking some tonic water 6 to 8 ounces daily and over-the-counter magnesium supplement and do appropriate stretches. She states she will give this a try. Alek Coburn REVIEW OF SYMPTOMS    Review of Systems   Constitutional: Negative for activity change and fatigue. HENT: Negative for congestion, hearing loss, mouth sores and trouble swallowing. Eyes: Negative for pain and visual disturbance. Respiratory: Negative for chest tightness, shortness of breath and wheezing. Cardiovascular: Negative for chest pain and palpitations. Gastrointestinal: Negative for abdominal pain, blood in stool, diarrhea, nausea and vomiting. Endocrine: Negative for polydipsia and polyuria. Genitourinary: Negative for dysuria, frequency and urgency. Musculoskeletal: Negative for arthralgias, gait problem and neck stiffness. Patient with complaints of muscle spasming and lower extremities and feet. Skin: Negative for rash.    Allergic/Immunologic: Negative for environmental allergies. Neurological: Negative for dizziness, seizures, speech difficulty and weakness. Hematological: Does not bruise/bleed easily. Psychiatric/Behavioral: Negative for agitation, confusion and hallucinations. PAST MEDICAL HISTORY  Past Medical History:   Diagnosis Date    Anxiety state     Arthritis     Benign essential HTN     Breast lump     Bunion of great toe of right foot     CKD (chronic kidney disease), stage II     Colon polyp 10-    Family history of early CAD     father had CABG in his 46s and 62s, brother had stroke and MI in his 76s. son had MI in 45s other son stroke at young age   [facility] GERD (gastroesophageal reflux disease)     History of nuclear stress test 07/07/2020    EF 71%. Normal study.  Hyperlipidemia     Internal hemorrhoids 10-    Irritable bowel syndrome     Nausea & vomiting     Osteoarthritis     Rosacea        FAMILY HISTORY  Family History   Problem Relation Age of Onset    Other Son 22        CROHNS       SOCIAL HISTORY  Social History     Socioeconomic History    Marital status:      Spouse name: None    Number of children: None    Years of education: None    Highest education level: None   Occupational History    None   Tobacco Use    Smoking status: Never Smoker    Smokeless tobacco: Never Used   Vaping Use    Vaping Use: Never used   Substance and Sexual Activity    Alcohol use: No    Drug use: No    Sexual activity: Yes     Partners: Male   Other Topics Concern    None   Social History Narrative    None     Social Determinants of Health     Financial Resource Strain:     Difficulty of Paying Living Expenses: Not on file   Food Insecurity:     Worried About Running Out of Food in the Last Year: Not on file    [place]orah of Food in the Last Year: Not on file   Transportation Needs:     Lack of Transportation (Medical): Not on file    Lack of Transportation (Non-Medical):  Not on file   Physical Activity:     Days of Exercise per Week: Not on file    Minutes of Exercise per Session: Not on file   Stress:     Feeling of Stress : Not on file   Social Connections:     Frequency of Communication with Friends and Family: Not on file    Frequency of Social Gatherings with Friends and Family: Not on file    Attends Bahai Services: Not on file    Active Member of Clubs or Organizations: Not on file    Attends Club or Organization Meetings: Not on file    Marital Status: Not on file   Intimate Partner Violence:     Fear of Current or Ex-Partner: Not on file    Emotionally Abused: Not on file    Physically Abused: Not on file    Sexually Abused: Not on file   Housing Stability:     Unable to Pay for Housing in the Last Year: Not on file    Number of [place] in the Last Year: Not on file    Unstable Housing in the Last Year: Not on file        SURGICAL HISTORY  Past Surgical History:   Procedure Laterality Date   [address]    [address]  04/25/2018    DIAGNOSTIC CARDIAC CATH LAB PROCEDURE      FOOT SURGERY      Multiple Foot surgeries including pin in ankle and Heel Spurs    FOOT TENDON SURGERY      TUBAL LIGATION  1982    TUMOR REMOVAL      NECK X2                 CURRENT MEDICATIONS  Current Outpatient Medications   Medication Sig Dispense Refill    triamterene-hydroCHLOROthiazide (MAXZIDE-25) 37.5-25 MG per tablet Take 0.5 tablets by mouth daily 45 tablet 1    atorvastatin (LIPITOR) 20 MG tablet Take 1 tablet by mouth daily 90 tablet 1    nitroGLYCERIN (NITROSTAT) 0.4 MG SL tablet up to max of 3 total doses.  If no relief after 1 dose, call 911. 25 tablet 3    isosorbide mononitrate (IMDUR) 30 MG extended release tablet Take 1 tablet by mouth daily 90 tablet 3    aspirin 81 MG EC tablet Take 1 tablet by mouth daily 90 tablet 3    DENTA 5000 PLUS is warm and dry. Coloration: Skin is not jaundiced. Findings: No bruising. Neurological:      General: No focal deficit present. Mental Status: She is alert and oriented to person, place, and time. Cranial Nerves: No cranial nerve deficit. Motor: No weakness. Gait: Gait normal.   Psychiatric:         Mood and Affect: Mood normal.         Behavior: Behavior normal.         Thought Content: Thought content normal.         ASSESSMENT & PLAN     Diagnosis Orders   1. Benign essential HTN  COMPREHENSIVE METABOLIC PANEL   2. Pure hypercholesterolemia  LIPID PANEL   3. CKD (chronic kidney disease), stage II     4. Other osteoarthritis of spine, lumbar region     5. Anxiety state     6. Need for hepatitis C screening test  HEPATITIS C ANTIBODY   7. Muscle spasm     This point med list reviewed and refills provided. Advised to get Covid booster ASAP. She is to get her mammogram next week. She will push fluids use magnesium supplement over-the-counter and consider tonic water supplementation. We will check hepatitis C, lipid panel and CMP today and have her follow-up for results. Continue with otherwise healthy lifestyle and call with questions or issues. Return in about 6 months (around 7/6/2022).          Electronically signed by Jami Galo MD on 1/6/2022

## 2022-01-07 ENCOUNTER — TELEPHONE (OUTPATIENT)
Dept: FAMILY MEDICINE CLINIC | Age: 72
End: 2022-01-07

## 2022-01-07 DIAGNOSIS — N18.2 CKD (CHRONIC KIDNEY DISEASE), STAGE II: Primary | ICD-10-CM

## 2022-01-07 NOTE — TELEPHONE ENCOUNTER
To Dr. Cooper Garcia,    Patient was able to see lab results in [address] and then they vanished---can you \"re-release\" them so she can see them ----also, can you call her with the explained results.     Phone:  [phone]

## 2022-01-07 NOTE — TELEPHONE ENCOUNTER
I think very released the lab results. As I discussed with you all she needs to do is push fluids we can think about rate check and BMP in 3 to 4 weeks. I think we discussed this with her earlier in the day. I did discuss this with her at the time of the visit Mag-Ox 400 one or two daily for magnesium supplement and importance of drinking more fluids. I think this small bump downward in her kidney filtration is from lack of daily fluids.

## 2022-01-12 ENCOUNTER — OFFICE VISIT (OUTPATIENT)
Dept: FAMILY MEDICINE CLINIC | Age: 72
End: 2022-01-12
Payer: MEDICARE

## 2022-01-12 VITALS
HEART RATE: 69 BPM | BODY MASS INDEX: 26.46 KG/M2 | OXYGEN SATURATION: 99 % | DIASTOLIC BLOOD PRESSURE: 80 MMHG | HEIGHT: 64 IN | WEIGHT: 155 LBS | SYSTOLIC BLOOD PRESSURE: 158 MMHG

## 2022-01-12 DIAGNOSIS — Z00.00 WELL ADULT HEALTH CHECK: Primary | ICD-10-CM

## 2022-01-12 DIAGNOSIS — Z00.00 ROUTINE GENERAL MEDICAL EXAMINATION AT A HEALTH CARE FACILITY: ICD-10-CM

## 2022-01-12 PROCEDURE — G0439 PPPS, SUBSEQ VISIT: HCPCS | Performed by: FAMILY MEDICINE

## 2022-01-12 NOTE — PROGRESS NOTES
Medicare Annual Wellness Visit  Name: Emily Cline Date: 2022   MRN: [id number] Sex: Female   Age: 66 y.o. Ethnicity: Non- / Non    : 1950 Race: White (non-)      Marda Rubinstein is here for Medicare AWV    Screenings for behavioral, psychosocial and functional/safety risks, and cognitive dysfunction are all negative except as indicated below. These results, as well as other patient data from the [address] form, are documented in Flowsheets linked to this Encounter. Allergies   Allergen Reactions    Clindamycin/Lincomycin Swelling     Lips and tongue    Gabapentin Nausea Only and Other (See Comments)     Dizziness/nausea    Methylprednisolone Rash     Red face       Prior to Visit Medications    Medication Sig Taking? Authorizing Provider   triamterene-hydroCHLOROthiazide (MAXZIDE-25) 37.5-25 MG per tablet Take 0.5 tablets by mouth daily Yes Sugey Acevedo MD   atorvastatin (LIPITOR) 20 MG tablet Take 1 tablet by mouth daily Yes Sugey Acevedo MD   nitroGLYCERIN (NITROSTAT) 0.4 MG SL tablet up to max of 3 total doses. If no relief after 1 dose, call 911.  Yes Jessica Garcia MD   isosorbide mononitrate (IMDUR) 30 MG extended release tablet Take 1 tablet by mouth daily Yes Jessica Garcia MD   aspirin 81 MG EC tablet Take 1 tablet by mouth daily Yes Jessica Garcia MD   DENTA 5000 PLUS 1.1 % CREA Take 1 Dose by mouth nightly Yes Historical Provider, MD   Omega-3 Fatty Acids (FISH OIL) 1000 MG CAPS Take 3,000 mg by mouth 3 times daily Yes Historical Provider, MD   Calcium Carbonate-Vitamin D (CALCIUM 600+D HIGH POTENCY PO) Take by mouth Yes Historical Provider, MD   Multiple Vitamins-Minerals (HAIR SKIN AND NAILS FORMULA PO) Take by mouth Yes Historical Provider, MD   simethicone (MYLICON) 80 MG chewable tablet Take 80 mg by mouth every 6 hours as needed for Flatulence Yes Historical Provider, MD   Lactase (LACTAID PO) Take by mouth as needed Yes Historical Provider, MD       Past Medical History:   Diagnosis Date    Anxiety state     Arthritis     Benign essential HTN     Breast lump     Bunion of great toe of right foot     CKD (chronic kidney disease), stage II     Colon polyp 10-    Family history of early CAD     father had CABG in his 46s and 62s, brother had stroke and MI in his 76s. son had MI in 45s other son stroke at young age   [employer] GERD (gastroesophageal reflux disease)     History of nuclear stress test 07/07/2020    EF 71%. Normal study.  Hyperlipidemia     Internal hemorrhoids 10-    Irritable bowel syndrome     Nausea & vomiting     Osteoarthritis     Rosacea        Past Surgical History:   Procedure Laterality Date    APPENDECTOMY  1963    BREAST SURGERY  2002    BIOPSY    BUNIONECTOMY Left     [address]IAC CATHETERIZATION  1988   [address]    COLONOSCOPY  04/25/2018    DIAGNOSTIC CARDIAC CATH LAB PROCEDURE      FOOT SURGERY      Multiple Foot surgeries including pin in ankle and Heel Spurs    FOOT TENDON SURGERY      TUBAL LIGATION  1982    TUMOR REMOVAL      NECK X2       Family History   Problem Relation Age of Onset    Other Son 22        CROHNS       CareTeam (Including outside providers/suppliers regularly involved in providing care):   Patient Care Team:  Susy Munoz MD as PCP - General  Susy Munoz MD as PCP - REHABILITATION [facility] Empaneled Provider    Wt Readings from Last 3 Encounters:   01/12/22 155 lb (70.3 kg)   01/06/22 157 lb (71.2 kg)   07/06/21 158 lb (71.7 kg)     Vitals:    01/12/22 0902   BP: (!) 158/80   Site: Right Upper Arm   Position: Sitting   Cuff Size: Medium Adult   Pulse: 69   SpO2: 99%   Weight: 155 lb (70.3 kg)   Height: 5' 4\" (1.626 m)     Body mass index is 26.61 kg/m². Based upon direct observation of the patient, evaluation of cognition reveals recent and remote memory intact.         Patient's complete Health Risk Assessment and screening values have been reviewed and are found in Flowsheets. The following problems were reviewed today and where indicated follow up appointments were made and/or referrals ordered.       Positive Risk Factor Screenings with Interventions:             Health Habits/Nutrition:  Health Habits/Nutrition  Do you exercise for at least 20 minutes 2-3 times per week?: (!) No  Have you lost any weight without trying in the past 3 months?: No  Do you eat only one meal per day?: No  Have you seen the dentist within the past year?: Yes  Body mass index: (!) 26.6  Health Habits/Nutrition Interventions:  · increase regular execrcise    Hearing/Vision:  No exam data present  Hearing/Vision  Do you or your family notice any trouble with your hearing that hasn't been managed with hearing aids?: No  Do you have difficulty driving, watching TV, or doing any of your daily activities because of your eyesight?: No  Have you had an eye exam within the past year?: (!) No  Hearing/Vision Interventions:  · get regular eye and hearing evals    Safety:  Safety  Do you have working smoke detectors?: Yes  Have all throw rugs been removed or fastened?: (!) No  Do you have non-slip mats or surfaces in all bathtubs/showers?: Yes  Do all of your stairways have a railing or banister?: (!) No  Are your doorways, halls and stairs free of clutter?: Yes  Do you always fasten your seatbelt when you are in a car?: Yes  Safety Interventions:  · Home safety tips provided       Personalized Preventive Plan   Current Health Maintenance Status  Immunization History   Administered Date(s) Administered    COVID-19, Alin Infante, Primary or Immunocompromised, PF, 100mcg/0.5mL 02/25/2021, 03/25/2021    Influenza, High Dose (Fluzone 65 yrs and older) 10/05/2016, 10/12/2017, 09/20/2018, 10/02/2019    Influenza, High-dose, Joseph Hum, 65 yrs +, IM (Fluzone) 09/23/2020    Influenza, Quadv, adjuvanted, 65 yrs +, IM, PF (Fluad) 10/06/2021    Pneumococcal Conjugate 13-valent ([id number]) 08/05/2015    Pneumococcal Polysaccharide ([id number]) 06/04/2019    Tdap (Boostrix, Adacel) 08/08/2016    Zoster Live (Zostavax) 08/31/2018, 11/27/2018    Zoster Recombinant (Shingrix) 08/31/2018, 11/27/2018        Health Maintenance   Topic Date Due    Shingles Vaccine (2 of 3) 01/22/2019    COVID-19 Vaccine (3 - Booster for Moderna series) 09/25/2021    Annual Wellness Visit (AWV)  01/12/2022    Breast cancer screen  12/08/2022    Depression Screen  01/05/2023    Lipid screen  01/06/2023    Potassium monitoring  01/06/2023    Creatinine monitoring  01/06/2023    DTaP/Tdap/Td vaccine (2 - Td or Tdap) 08/08/2026    Colon cancer screen colonoscopy  04/25/2028    DEXA (modify frequency per FRAX score)  Completed    Flu vaccine  Completed    Pneumococcal 65+ years Vaccine  Completed    Hepatitis C screen  Completed    Hepatitis A vaccine  Aged Out    Hepatitis B vaccine  Aged Out    Hib vaccine  Aged Out    Meningococcal (ACWY) vaccine  Aged Out     Recommendations for Social Median Due: see orders and patient instructions/AVS.  . Recommended screening schedule for the next 5-10 years is provided to the patient in written form: see Patient Instructions/AVS.    Babak Brown was seen today for medicare awv.     Diagnoses and all orders for this visit:    Well adult health check

## 2022-01-12 NOTE — PATIENT INSTRUCTIONS
Personalized Preventive Plan for Celia Eddy - 1/12/2022  Medicare offers a range of preventive health benefits. Some of the tests and screenings are paid in full while other may be subject to a deductible, co-insurance, and/or copay. Some of these benefits include a comprehensive review of your medical history including lifestyle, illnesses that may run in your family, and various assessments and screenings as appropriate. After reviewing your medical record and screening and assessments performed today your provider may have ordered immunizations, labs, imaging, and/or referrals for you. A list of these orders (if applicable) as well as your Preventive Care list are included within your After Visit Summary for your review. Other Preventive Recommendations:    · A preventive eye exam performed by an eye specialist is recommended every 1-2 years to screen for glaucoma; cataracts, macular degeneration, and other eye disorders. · A preventive dental visit is recommended every 6 months. · Try to get at least 150 minutes of exercise per week or 10,000 steps per day on a pedometer . · Order or download the FREE \"Exercise & Physical Activity: Your Everyday Guide\" from [employer]. Call 1-165.567.7155 or search [employer] online. · You need 8405-9366 mg of calcium and 5997-2442 IU of vitamin D per day. It is possible to meet your calcium requirement with diet alone, but a vitamin D supplement is usually necessary to meet this goal.  · When exposed to the sun, use a sunscreen that protects against both UVA and UVB radiation with an SPF of 30 or greater. Reapply every 2 to 3 hours or after sweating, drying off with a towel, or swimming. · Always wear a seat belt when traveling in a car. Always wear a helmet when riding a bicycle or motorcycle.

## 2022-01-13 LAB — MAMMOGRAPHY, EXTERNAL: NORMAL

## 2022-01-24 ENCOUNTER — OFFICE VISIT (OUTPATIENT)
Dept: CARDIOLOGY CLINIC | Age: 72
End: 2022-01-24
Payer: MEDICARE

## 2022-01-24 VITALS
SYSTOLIC BLOOD PRESSURE: 188 MMHG | HEART RATE: 62 BPM | HEIGHT: 64 IN | BODY MASS INDEX: 26.46 KG/M2 | WEIGHT: 155 LBS | DIASTOLIC BLOOD PRESSURE: 86 MMHG

## 2022-01-24 DIAGNOSIS — E78.00 PURE HYPERCHOLESTEROLEMIA: ICD-10-CM

## 2022-01-24 DIAGNOSIS — I20.9 ANGINA PECTORIS, UNSPECIFIED (HCC): ICD-10-CM

## 2022-01-24 DIAGNOSIS — I10 BENIGN ESSENTIAL HTN: Primary | ICD-10-CM

## 2022-01-24 DIAGNOSIS — Z82.49 FAMILY HISTORY OF EARLY CAD: ICD-10-CM

## 2022-01-24 PROBLEM — I25.119 ATHEROSCLEROTIC HEART DISEASE OF NATIVE CORONARY ARTERY WITH UNSPECIFIED ANGINA PECTORIS (HCC): Status: ACTIVE | Noted: 2022-01-24

## 2022-01-24 PROBLEM — R07.9 CHEST PAIN: Status: RESOLVED | Noted: 2020-06-30 | Resolved: 2022-01-24

## 2022-01-24 PROBLEM — I25.119 ATHEROSCLEROTIC HEART DISEASE OF NATIVE CORONARY ARTERY WITH UNSPECIFIED ANGINA PECTORIS (HCC): Status: RESOLVED | Noted: 2022-01-24 | Resolved: 2022-01-24

## 2022-01-24 PROCEDURE — 93000 ELECTROCARDIOGRAM COMPLETE: CPT | Performed by: INTERNAL MEDICINE

## 2022-01-24 PROCEDURE — 99214 OFFICE O/P EST MOD 30 MIN: CPT | Performed by: INTERNAL MEDICINE

## 2022-01-24 RX ORDER — ISOSORBIDE MONONITRATE 30 MG/1
30 TABLET, EXTENDED RELEASE ORAL DAILY
Qty: 90 TABLET | Refills: 3 | Status: SHIPPED | OUTPATIENT
Start: 2022-01-24

## 2022-01-24 NOTE — ASSESSMENT & PLAN NOTE
Well-controlled on current medications. Her numbers are high in the office however home numbers are good. She is counseled to bring the home to the office for validation.

## 2022-01-24 NOTE — PATIENT INSTRUCTIONS
Continue current cardiovascular medications which have been reviewed and discussed individually with you. Primary prevention is the goal by aggressive risk modification, healthy and therapeutic life style changes for cardiovascular risk reduction. Various goals are discussed and questions answered. Follow-up in 12 months with EKG, sooner if needed.

## 2022-01-24 NOTE — PROGRESS NOTES
Allan Gorman (:  1950) is a 66 y.o. female,     Chief Complaint   Patient presents with    Hypertension     OV for 1 year check. Pt denies any chest pain,SOB,dizziness,swelling to ankles,or palpirtations.  Hyperlipidemia    1 Year Follow Up     Patient is here for follow up for hypertension and hyperlipidemia and history of angina controlled on the current medications. She does not do any regular exercises. She has taken 2 vaccines for COVID-19 and is due to have booster. She has been compliant to her medications and monitor her blood pressure at home and she brought the readings and her blood pressure at home is much lower and within normal range compared to in the office suggesting whitecoat syndrome. She is somewhat anxious. 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 Medications are reviewed and discussed with her. Allergies   Allergen Reactions    Clindamycin/Lincomycin Swelling     Lips and tongue    Gabapentin Nausea Only and Other (See Comments)     Dizziness/nausea    Methylprednisolone Rash     Red face     Prior to Admission medications    Medication Sig Start Date End Date Taking? Authorizing Provider   magnesium hydroxide (MILK OF MAGNESIA) 400 MG/5ML suspension Take by mouth daily as needed for Constipation   Yes Historical Provider, MD   isosorbide mononitrate (IMDUR) 30 MG extended release tablet Take 1 tablet by mouth daily 22  Yes Aspen Hernandez MD   triamterene-hydroCHLOROthiazide [facility]) 37.5-25 MG per tablet Take 0.5 tablets by mouth daily 22  Yes Lauren Alvarez MD   atorvastatin (LIPITOR) 20 MG tablet Take 1 tablet by mouth daily 22  Yes Lauren Alvarez MD   nitroGLYCERIN (NITROSTAT) 0.4 MG SL tablet up to max of 3 total doses.  If no relief after 1 dose, call 911. 21  Yes Aspen Hernandez MD   aspirin 81 MG EC tablet Take 1 tablet by mouth daily 20  Yes Aspen Hernandez MD   DENTA 5000 PLUS 1.1 % CREA Take 1 Dose by mouth nightly 10/14/19  Yes Historical Provider, MD   Omega-3 Fatty Acids (FISH OIL) 1000 MG CAPS Take 3,000 mg by mouth 3 times daily   Yes Historical Provider, MD   Calcium Carbonate-Vitamin D (CALCIUM 600+D HIGH POTENCY PO) Take by mouth   Yes Historical Provider, MD   Multiple Vitamins-Minerals (HAIR SKIN AND NAILS FORMULA PO) Take by mouth   Yes Historical Provider, MD   simethicone (MYLICON) 80 MG chewable tablet Take 80 mg by mouth every 6 hours as needed for Flatulence   Yes Historical Provider, MD   Lactase (LACTAID PO) Take by mouth as needed    Yes Historical Provider, MD     Past Medical History:   Diagnosis Date    Anxiety state     Arthritis     Benign essential HTN     Breast lump     Bunion of great toe of right foot     CKD (chronic kidney disease), stage II     Colon polyp 10-    Family history of early CAD     father had CABG in his 46s and 62s, brother had stroke and MI in his 76s. son had MI in 45s other son stroke at young age   [place] GERD (gastroesophageal reflux disease)     History of nuclear stress test 07/07/2020    EF 71%. Normal study.  Hyperlipidemia     Internal hemorrhoids 10-    Irritable bowel syndrome     Nausea & vomiting     Osteoarthritis     Rosacea       Vitals:    01/24/22 0928 01/24/22 0939   BP: (!) 188/90 (!) 188/86   Pulse: 62    Weight: 155 lb (70.3 kg)    Height: 5' 4\" (1.626 m)       Body mass index is 26.61 kg/m². Wt Readings from Last 3 Encounters:   01/24/22 155 lb (70.3 kg)   01/12/22 155 lb (70.3 kg)   01/06/22 157 lb (71.2 kg)     Constitutional:  Patient is normally built and nourished female in no apparent distress. HEENT: Wearing glasses and facemask. Cardiovascular: Auscultation: Normal S1 and S2. No murmurs or gallops noted. Carotids are negative for bruits. A  Respiratory:  Respiratory effort is normal. Breath sounds are clear to auscultation. Extremities:  No edema, clubbing,  Cyanosis, petechiae.    Abdomen:  No masses or tenderness. No organomegaly noted. Neurologic:  Oriented to time, place, and person and non-anxious. No focal neurological deficit noted. Psychiatric: Normal mood and effect. EKG is consistent with normal sinus rhythm 62 bpm normal EKG. Pertinent records reviewed and discussed with patient and results are as follow:    Lab Results   Component Value Date    WBC 6.6 01/05/2021    HGB 12.4 01/05/2021    HCT 35.8 01/05/2021     01/05/2021     Lab Results   Component Value Date    CHOL 152 01/06/2022    TRIG 120 01/06/2022    HDL 72 (H) 01/06/2022    LDLCALC 56 01/06/2022    LDLDIRECT 77 07/01/2020     Lab Results   Component Value Date    BUN 16 01/06/2022    CREATININE 1.1 01/06/2022     01/06/2022    K 4.2 01/06/2022     ASSESSMENT/PLAN:    1. Benign essential HTN  Assessment & Plan:  Well-controlled on current medications. Her numbers are high in the office however home numbers are good. She is counseled to bring the home to the office for validation. 2. Pure hypercholesterolemia  Assessment & Plan:  Recent LDL was 56 on current dose of Lipitor 20 mg daily. Continue the same. 3. Family history of early CAD  Assessment & Plan:  Continue aggressive risk factor modification for primary prevention. 4. Angina pectoris, unspecified  Assessment & Plan:  Fairly well controlled on Imdur 30 mg daily. Continue the same. Continue current cardiovascular medications which have been reviewed and discussed individually with you. Primary prevention is the goal by aggressive risk modification, healthy and therapeutic life style changes for cardiovascular risk reduction. Various goals are discussed and questions answered. Follow-up in 12 months with EKG, sooner if needed. An electronic signature was used to authenticate this note.     --Toni Sanders MD

## 2022-01-27 DIAGNOSIS — N18.2 CKD (CHRONIC KIDNEY DISEASE), STAGE II: ICD-10-CM

## 2022-01-27 LAB
ANION GAP SERPL CALCULATED.3IONS-SCNC: 13 MMOL/L (ref 3–16)
BUN BLDV-MCNC: 14 MG/DL (ref 7–20)
CALCIUM SERPL-MCNC: 9.8 MG/DL (ref 8.3–10.6)
CHLORIDE BLD-SCNC: 101 MMOL/L (ref 99–110)
CO2: 27 MMOL/L (ref 21–32)
CREAT SERPL-MCNC: 1 MG/DL (ref 0.6–1.2)
GFR AFRICAN AMERICAN: >60
GFR NON-AFRICAN AMERICAN: 54
GLUCOSE BLD-MCNC: 93 MG/DL (ref 70–99)
POTASSIUM SERPL-SCNC: 4.6 MMOL/L (ref 3.5–5.1)
SODIUM BLD-SCNC: 141 MMOL/L (ref 136–145)

## 2022-07-06 ENCOUNTER — OFFICE VISIT (OUTPATIENT)
Dept: FAMILY MEDICINE CLINIC | Age: 72
End: 2022-07-06
Payer: MEDICARE

## 2022-07-06 VITALS
OXYGEN SATURATION: 100 % | HEIGHT: 64 IN | WEIGHT: 156.7 LBS | DIASTOLIC BLOOD PRESSURE: 64 MMHG | HEART RATE: 67 BPM | RESPIRATION RATE: 20 BRPM | BODY MASS INDEX: 26.75 KG/M2 | SYSTOLIC BLOOD PRESSURE: 110 MMHG

## 2022-07-06 DIAGNOSIS — E78.00 PURE HYPERCHOLESTEROLEMIA: ICD-10-CM

## 2022-07-06 DIAGNOSIS — K21.9 GASTROESOPHAGEAL REFLUX DISEASE WITHOUT ESOPHAGITIS: ICD-10-CM

## 2022-07-06 DIAGNOSIS — M79.672 PAIN IN BOTH FEET: ICD-10-CM

## 2022-07-06 DIAGNOSIS — M47.896 OTHER OSTEOARTHRITIS OF SPINE, LUMBAR REGION: ICD-10-CM

## 2022-07-06 DIAGNOSIS — F41.1 ANXIETY STATE: ICD-10-CM

## 2022-07-06 DIAGNOSIS — I10 BENIGN ESSENTIAL HTN: ICD-10-CM

## 2022-07-06 DIAGNOSIS — I10 BENIGN ESSENTIAL HTN: Primary | ICD-10-CM

## 2022-07-06 DIAGNOSIS — M79.671 PAIN IN BOTH FEET: ICD-10-CM

## 2022-07-06 LAB
A/G RATIO: 2 (ref 1.1–2.2)
ALBUMIN SERPL-MCNC: 4.4 G/DL (ref 3.4–5)
ALP BLD-CCNC: 98 U/L (ref 40–129)
ALT SERPL-CCNC: 12 U/L (ref 10–40)
ANION GAP SERPL CALCULATED.3IONS-SCNC: 17 MMOL/L (ref 3–16)
AST SERPL-CCNC: 19 U/L (ref 15–37)
BILIRUB SERPL-MCNC: 0.8 MG/DL (ref 0–1)
BUN BLDV-MCNC: 16 MG/DL (ref 7–20)
CALCIUM SERPL-MCNC: 9.9 MG/DL (ref 8.3–10.6)
CHLORIDE BLD-SCNC: 100 MMOL/L (ref 99–110)
CHOLESTEROL, TOTAL: 155 MG/DL (ref 0–199)
CO2: 25 MMOL/L (ref 21–32)
CREAT SERPL-MCNC: 1.2 MG/DL (ref 0.6–1.2)
GFR AFRICAN AMERICAN: 53
GFR NON-AFRICAN AMERICAN: 44
GLUCOSE BLD-MCNC: 90 MG/DL (ref 70–99)
HCT VFR BLD CALC: 34.5 % (ref 36–48)
HDLC SERPL-MCNC: 69 MG/DL (ref 40–60)
HEMOGLOBIN: 12.4 G/DL (ref 12–16)
LDL CHOLESTEROL CALCULATED: 59 MG/DL
MCH RBC QN AUTO: 32.5 PG (ref 26–34)
MCHC RBC AUTO-ENTMCNC: 36 G/DL (ref 31–36)
MCV RBC AUTO: 90 FL (ref 80–100)
PDW BLD-RTO: 13.9 % (ref 12.4–15.4)
PLATELET # BLD: 199 K/UL (ref 135–450)
PMV BLD AUTO: 8.6 FL (ref 5–10.5)
POTASSIUM SERPL-SCNC: 4.2 MMOL/L (ref 3.5–5.1)
RBC # BLD: 3.83 M/UL (ref 4–5.2)
SODIUM BLD-SCNC: 142 MMOL/L (ref 136–145)
TOTAL PROTEIN: 6.6 G/DL (ref 6.4–8.2)
TRIGL SERPL-MCNC: 136 MG/DL (ref 0–150)
VLDLC SERPL CALC-MCNC: 27 MG/DL
WBC # BLD: 6.2 K/UL (ref 4–11)

## 2022-07-06 PROCEDURE — 99214 OFFICE O/P EST MOD 30 MIN: CPT | Performed by: FAMILY MEDICINE

## 2022-07-06 PROCEDURE — 1123F ACP DISCUSS/DSCN MKR DOCD: CPT | Performed by: FAMILY MEDICINE

## 2022-07-06 RX ORDER — ATORVASTATIN CALCIUM 20 MG/1
TABLET, FILM COATED ORAL
Qty: 90 TABLET | Refills: 1 | Status: SHIPPED | OUTPATIENT
Start: 2022-07-06

## 2022-07-06 RX ORDER — TRIAMTERENE AND HYDROCHLOROTHIAZIDE 37.5; 25 MG/1; MG/1
TABLET ORAL
Qty: 45 TABLET | Refills: 1 | Status: SHIPPED | OUTPATIENT
Start: 2022-07-06 | End: 2022-10-10 | Stop reason: SDUPTHER

## 2022-07-06 RX ORDER — PREGABALIN 50 MG/1
50 CAPSULE ORAL 2 TIMES DAILY
Qty: 60 CAPSULE | Refills: 0 | Status: SHIPPED | OUTPATIENT
Start: 2022-07-06 | End: 2022-08-01 | Stop reason: SDUPTHER

## 2022-07-06 SDOH — ECONOMIC STABILITY: FOOD INSECURITY: WITHIN THE PAST 12 MONTHS, THE FOOD YOU BOUGHT JUST DIDN'T LAST AND YOU DIDN'T HAVE MONEY TO GET MORE.: NEVER TRUE

## 2022-07-06 SDOH — ECONOMIC STABILITY: FOOD INSECURITY: WITHIN THE PAST 12 MONTHS, YOU WORRIED THAT YOUR FOOD WOULD RUN OUT BEFORE YOU GOT MONEY TO BUY MORE.: NEVER TRUE

## 2022-07-06 ASSESSMENT — ENCOUNTER SYMPTOMS
BLOOD IN STOOL: 0
DIARRHEA: 0
TROUBLE SWALLOWING: 0
WHEEZING: 0
CHEST TIGHTNESS: 0
VOMITING: 0
SHORTNESS OF BREATH: 0
ABDOMINAL PAIN: 0
EYE PAIN: 0
PHOTOPHOBIA: 0
NAUSEA: 0

## 2022-07-06 ASSESSMENT — SOCIAL DETERMINANTS OF HEALTH (SDOH): HOW HARD IS IT FOR YOU TO PAY FOR THE VERY BASICS LIKE FOOD, HOUSING, MEDICAL CARE, AND HEATING?: NOT HARD AT ALL

## 2022-07-06 NOTE — PROGRESS NOTES
7/6/2022    Rhina Portillo    Chief Complaint   Patient presents with    6 Month Follow-Up     No complaints. HPI  History was obtained from the patient. Benson Eaton is a 66 y.o. female who presents today with routine follow-up on hypertension, hyperlipidemia, anxiety, GERD, osteoarthritis, paresthesias, and history of foot pain. Otherwise patient has been compliant with meds and staying active. Anxiety symptoms change in intensity. GERD symptoms are controlled. Arthritic symptoms somewhat dependent on activity. She had a mammogram in January of this year she has had 2 COVID shots and 1 COVID booster. Having uncontrolled foot pain and some paresthesias symptoms. After discussion we will try her on generic Lyrica 50 mg twice daily -patient did not tolerate gabapentin well. We will also check lipid panel, CBC, and a CMP today-we will follow-up for results. She needs to get COVID booster #2, also. REVIEW OF SYMPTOMS    Review of Systems   Constitutional: Negative for activity change and fatigue. HENT: Negative for congestion, hearing loss, mouth sores and trouble swallowing. Eyes: Negative for photophobia, pain and visual disturbance. Respiratory: Negative for chest tightness, shortness of breath and wheezing. Cardiovascular: Negative for chest pain and palpitations. Gastrointestinal: Negative for abdominal pain, blood in stool, diarrhea, nausea and vomiting. Endocrine: Positive for polydipsia. Negative for polyuria. Genitourinary: Negative for dysuria, frequency and urgency. Musculoskeletal: Positive for arthralgias. Negative for gait problem and neck stiffness. Skin: Negative for rash. Allergic/Immunologic: Negative for environmental allergies. Neurological: Positive for numbness. Negative for dizziness, seizures, speech difficulty, weakness and light-headedness. Hematological: Does not bruise/bleed easily.    Psychiatric/Behavioral: Negative for agitation, confusion, dysphoric mood, hallucinations, self-injury and suicidal ideas. The patient is nervous/anxious. The patient is not hyperactive. PAST MEDICAL HISTORY  Past Medical History:   Diagnosis Date    Anxiety state     Arthritis     Benign essential HTN     Breast lump     Bunion of great toe of right foot     CKD (chronic kidney disease), stage II     Colon polyp 10-    Family history of early CAD     father had CABG in his 46s and 62s, brother had stroke and MI in his 76s. son had MI in 45s other son stroke at young age   [de-identified] GERD (gastroesophageal reflux disease)     History of nuclear stress test 07/07/2020    EF 71%. Normal study.  Hyperlipidemia     Internal hemorrhoids 10-    Irritable bowel syndrome     Nausea & vomiting     Osteoarthritis     Rosacea        FAMILY HISTORY  Family History   Problem Relation Age of Onset    Other Son 22        CROHNS       SOCIAL HISTORY  Social History     Socioeconomic History    Marital status:      Spouse name: Not on file    Number of children: Not on file    Years of education: Not on file    Highest education level: Not on file   Occupational History    Not on file   Tobacco Use    Smoking status: Never Smoker    Smokeless tobacco: Never Used   Vaping Use    Vaping Use: Never used   Substance and Sexual Activity    Alcohol use: No    Drug use: No    Sexual activity: Yes     Partners: Male   Other Topics Concern    Not on file   Social History Narrative    Not on file     Social Determinants of Health     Financial Resource Strain: Low Risk     Difficulty of Paying Living Expenses: Not hard at all   Food Insecurity: No Food Insecurity    Worried About [address] in the Last Year: Never true    [address] in the Last Year: Never true   Transportation Needs:     Lack of Transportation (Medical): Not on file    Lack of Transportation (Non-Medical):  Not on file   Physical Activity:     Days of Exercise per Week: Not on file    Minutes of Exercise per Session: Not on file   Stress:     Feeling of Stress : Not on file   Social Connections:     Frequency of Communication with Friends and Family: Not on file    Frequency of Social Gatherings with Friends and Family: Not on file    Attends [employer] Services: Not on file    Active Member of [address] [place] [address] or Organizations: Not on file    Attends Club or Organization Meetings: Not on file    Marital Status: Not on file   Intimate Partner Violence:     Fear of Current or Ex-Partner: Not on file    Emotionally Abused: Not on file    Physically Abused: Not on file    Sexually Abused: Not on file   Housing Stability:     Unable to Pay for Housing in the Last Year: Not on file    Number of [place] in the Last Year: Not on file    Unstable Housing in the Last Year: Not on file        SURGICAL HISTORY  Past Surgical History:   Procedure Laterality Date   [address]    [address]  04/25/2018    FOOT SURGERY      Multiple Foot surgeries including pin in ankle and Heel Spurs    FOOT TENDON SURGERY      TUBAL LIGATION  1982    TUMOR REMOVAL      NECK X2                 CURRENT MEDICATIONS  Current Outpatient Medications   Medication Sig Dispense Refill    Magnesium 400 MG CAPS Take 400 mg by mouth in the morning and at bedtime      pregabalin (LYRICA) 50 MG capsule Take 1 capsule by mouth 2 times daily for 30 days. 60 capsule 0    isosorbide mononitrate (IMDUR) 30 MG extended release tablet Take 1 tablet by mouth daily 90 tablet 3    nitroGLYCERIN (NITROSTAT) 0.4 MG SL tablet up to max of 3 total doses.  If no relief after 1 dose, call 911. 25 tablet 3    aspirin 81 MG EC tablet Take 1 tablet by mouth daily 90 tablet 3    DENTA 5000 PLUS 1.1 % CREA Take 1 Dose by mouth nightly  3    Omega-3 Fatty Acids (FISH OIL) 1000 MG CAPS Take 3,000 mg by mouth 2 times daily       Calcium Carbonate-Vitamin D (CALCIUM 600+D HIGH POTENCY PO) Take by mouth      Multiple Vitamins-Minerals (HAIR SKIN AND NAILS FORMULA PO) Take by mouth      simethicone (MYLICON) 80 MG chewable tablet Take 80 mg by mouth every 6 hours as needed for Flatulence      Lactase (LACTAID PO) Take by mouth as needed       atorvastatin (LIPITOR) 20 MG tablet TAKE 1 TABLET DAILY 90 tablet 1    triamterene-hydroCHLOROthiazide (MAXZIDE-25) 37.5-25 MG per tablet TAKE 1/2 TABLET DAILY 45 tablet 1     No current facility-administered medications for this visit. ALLERGIES  Allergies   Allergen Reactions    Clindamycin/Lincomycin Swelling     Lips and tongue    Gabapentin Nausea Only and Other (See Comments)     Dizziness/nausea    Methylprednisolone Rash     Red face       PHYSICAL EXAM    /64 (Site: Right Upper Arm, Position: Sitting, Cuff Size: Large Adult)   Pulse 67   Resp 20   Ht 5' 4\" (1.626 m)   Wt 156 lb 11.2 oz (71.1 kg)   SpO2 100%   BMI 26.90 kg/m²     Physical Exam  Vitals and nursing note reviewed. Constitutional:       General: She is not in acute distress. Appearance: Normal appearance. She is well-developed. She is not ill-appearing, toxic-appearing or diaphoretic. HENT:      Head: Normocephalic and atraumatic. Nose: Nose normal.      Mouth/Throat:      Mouth: Mucous membranes are moist.      Pharynx: Oropharynx is clear. Eyes:      Pupils: Pupils are equal, round, and reactive to light. Cardiovascular:      Rate and Rhythm: Normal rate and regular rhythm. Heart sounds: Normal heart sounds. No murmur heard. No gallop. Pulmonary:      Effort: Pulmonary effort is normal. No respiratory distress. Breath sounds: Normal breath sounds. No wheezing, rhonchi or rales. Abdominal:      Palpations: Abdomen is soft. Musculoskeletal:         General: No swelling or deformity. Normal range of motion.

## 2022-07-26 ENCOUNTER — TELEPHONE (OUTPATIENT)
Dept: FAMILY MEDICINE CLINIC | Age: 72
End: 2022-07-26

## 2022-07-26 NOTE — TELEPHONE ENCOUNTER
Pt has been put on lyrica and it is working but side effect on pw is blurred vision and pt has had some everyday-not a lot.  Please advise

## 2022-07-27 NOTE — TELEPHONE ENCOUNTER
Would advise she continue it and see if side effects get better. If side effects persist we could either lower dose or have to have her stop it unfortunately.

## 2022-07-27 NOTE — TELEPHONE ENCOUNTER
Spoke with pt per provider note.  Pt agreed & voiced understanding    Pt states blurred vision is improving

## 2022-08-01 DIAGNOSIS — M79.672 PAIN IN BOTH FEET: ICD-10-CM

## 2022-08-01 DIAGNOSIS — M79.671 PAIN IN BOTH FEET: ICD-10-CM

## 2022-08-01 RX ORDER — PREGABALIN 50 MG/1
50 CAPSULE ORAL 2 TIMES DAILY
Qty: 60 CAPSULE | Refills: 1 | Status: SHIPPED | OUTPATIENT
Start: 2022-08-01 | End: 2022-10-06 | Stop reason: SDUPTHER

## 2022-09-05 DIAGNOSIS — M79.672 PAIN IN BOTH FEET: ICD-10-CM

## 2022-09-05 DIAGNOSIS — M79.671 PAIN IN BOTH FEET: ICD-10-CM

## 2022-09-06 RX ORDER — PREGABALIN 50 MG/1
CAPSULE ORAL
Qty: 60 CAPSULE | Refills: 0 | OUTPATIENT
Start: 2022-09-06

## 2022-10-06 DIAGNOSIS — M79.672 PAIN IN BOTH FEET: ICD-10-CM

## 2022-10-06 DIAGNOSIS — M79.671 PAIN IN BOTH FEET: ICD-10-CM

## 2022-10-07 RX ORDER — PREGABALIN 50 MG/1
50 CAPSULE ORAL 2 TIMES DAILY
Qty: 180 CAPSULE | Refills: 0 | Status: SHIPPED | OUTPATIENT
Start: 2022-10-07 | End: 2023-01-05

## 2022-10-10 ENCOUNTER — OFFICE VISIT (OUTPATIENT)
Dept: FAMILY MEDICINE CLINIC | Age: 72
End: 2022-10-10
Payer: MEDICARE

## 2022-10-10 VITALS
RESPIRATION RATE: 14 BRPM | SYSTOLIC BLOOD PRESSURE: 116 MMHG | DIASTOLIC BLOOD PRESSURE: 62 MMHG | BODY MASS INDEX: 27.3 KG/M2 | HEART RATE: 58 BPM | OXYGEN SATURATION: 98 % | HEIGHT: 64 IN | WEIGHT: 159.9 LBS

## 2022-10-10 DIAGNOSIS — I10 BENIGN ESSENTIAL HTN: ICD-10-CM

## 2022-10-10 DIAGNOSIS — M79.671 PAIN IN BOTH FEET: Primary | ICD-10-CM

## 2022-10-10 DIAGNOSIS — N18.31 STAGE 3A CHRONIC KIDNEY DISEASE (HCC): ICD-10-CM

## 2022-10-10 DIAGNOSIS — M47.896 OTHER OSTEOARTHRITIS OF SPINE, LUMBAR REGION: ICD-10-CM

## 2022-10-10 DIAGNOSIS — M79.672 PAIN IN BOTH FEET: Primary | ICD-10-CM

## 2022-10-10 DIAGNOSIS — E78.00 PURE HYPERCHOLESTEROLEMIA: ICD-10-CM

## 2022-10-10 DIAGNOSIS — K21.9 GASTROESOPHAGEAL REFLUX DISEASE WITHOUT ESOPHAGITIS: ICD-10-CM

## 2022-10-10 DIAGNOSIS — F41.1 ANXIETY STATE: ICD-10-CM

## 2022-10-10 PROBLEM — N18.30 CHRONIC RENAL DISEASE, STAGE III (HCC): Status: ACTIVE | Noted: 2022-10-10

## 2022-10-10 LAB
ANION GAP SERPL CALCULATED.3IONS-SCNC: 12 MMOL/L (ref 3–16)
BUN BLDV-MCNC: 14 MG/DL (ref 7–20)
CALCIUM SERPL-MCNC: 9.5 MG/DL (ref 8.3–10.6)
CHLORIDE BLD-SCNC: 102 MMOL/L (ref 99–110)
CO2: 27 MMOL/L (ref 21–32)
CREAT SERPL-MCNC: 1.1 MG/DL (ref 0.6–1.2)
GFR AFRICAN AMERICAN: 59
GFR NON-AFRICAN AMERICAN: 49
GLUCOSE BLD-MCNC: 88 MG/DL (ref 70–99)
POTASSIUM SERPL-SCNC: 4.2 MMOL/L (ref 3.5–5.1)
SODIUM BLD-SCNC: 141 MMOL/L (ref 136–145)

## 2022-10-10 PROCEDURE — 1123F ACP DISCUSS/DSCN MKR DOCD: CPT | Performed by: FAMILY MEDICINE

## 2022-10-10 PROCEDURE — 99213 OFFICE O/P EST LOW 20 MIN: CPT | Performed by: FAMILY MEDICINE

## 2022-10-10 RX ORDER — TRIAMTERENE AND HYDROCHLOROTHIAZIDE 37.5; 25 MG/1; MG/1
TABLET ORAL
Qty: 45 TABLET | Refills: 1 | Status: SHIPPED | OUTPATIENT
Start: 2022-10-10

## 2022-10-10 ASSESSMENT — ENCOUNTER SYMPTOMS
WHEEZING: 0
NAUSEA: 0
SHORTNESS OF BREATH: 0
CHEST TIGHTNESS: 0
DIARRHEA: 0
EYE PAIN: 0
TROUBLE SWALLOWING: 0
VOMITING: 0
ABDOMINAL PAIN: 0
BLOOD IN STOOL: 0

## 2022-10-10 NOTE — PROGRESS NOTES
10/10/2022    Leighann Garcia    Chief Complaint   Patient presents with    3 Month Follow-Up     No complaints. HPI  History was obtained from the patient. Virgil Varghese is a 67 y.o. female who presents today with follow-up on bilateral foot paresthesia after initiation of pregabalin therapy. The pregabalin is well-tolerated and has definitely helped. PDMP review was appropriate. Mammogram is up-to-date. Her anxiety and GERD symptoms are doing well labs in July look good. She has had 2 COVID shots and 2 COVID boosters. On review she will need the new COVID booster and high-dose flu shot at either pharmacy or health department of [facility]. REVIEW OF SYMPTOMS    Review of Systems   Constitutional:  Negative for activity change and fatigue. HENT:  Negative for congestion, hearing loss, mouth sores and trouble swallowing. Eyes:  Negative for pain and visual disturbance. Respiratory:  Negative for chest tightness, shortness of breath and wheezing. Cardiovascular:  Negative for chest pain and palpitations. Gastrointestinal:  Negative for abdominal pain, blood in stool, diarrhea, nausea and vomiting. Mild GE reflux-stable   Genitourinary:  Negative for dysuria, frequency and urgency. Musculoskeletal:  Negative for arthralgias, gait problem and neck stiffness. Skin:  Negative for rash. Allergic/Immunologic: Negative for environmental allergies. Neurological:  Positive for numbness. Negative for dizziness, seizures, speech difficulty and weakness. Hematological:  Does not bruise/bleed easily. Psychiatric/Behavioral:  Negative for agitation, confusion, dysphoric mood, hallucinations, self-injury and suicidal ideas. The patient is nervous/anxious.       PAST MEDICAL HISTORY  Past Medical History:   Diagnosis Date    Anxiety state     Arthritis     Benign essential HTN     Breast lump     Bunion of great toe of right foot     CKD (chronic kidney disease), stage II     Colon polyp 10-    Family history of early CAD     father had CABG in his 46s and 62s, brother had stroke and MI in his 76s. son had MI in 45s other son stroke at young age    GERD (gastroesophageal reflux disease)     History of nuclear stress test 07/07/2020    EF 71%. Normal study. Hyperlipidemia     Internal hemorrhoids 10-    Irritable bowel syndrome     Nausea & vomiting     Osteoarthritis     Rosacea        FAMILY HISTORY  Family History   Problem Relation Age of Onset    Other Son 22        CROHNS       SOCIAL HISTORY  Social History     Socioeconomic History    Marital status:    Tobacco Use    Smoking status: Never    Smokeless tobacco: Never   Vaping Use    Vaping Use: Never used   Substance and Sexual Activity    Alcohol use: No    Drug use: No    Sexual activity: Yes     Partners: Male     Social Determinants of Health     Financial Resource Strain: Low Risk     Difficulty of Paying Living Expenses: Not hard at all   Food Insecurity: No Food Insecurity    Worried About [address] in the Last Year: Never true    [facility] in the Last Year: Never true        SURGICAL HISTORY  Past Surgical History:   Procedure Laterality Date    [address] [facility]    BIOPSY    BUNIONECTOMY Left     [address]    COLONOSCOPY  04/25/2018    FOOT SURGERY      Multiple Foot surgeries including pin in ankle and Heel Spurs    FOOT TENDON SURGERY      TUBAL LIGATION  1982    TUMOR REMOVAL      NECK X2                 CURRENT MEDICATIONS  Current Outpatient Medications   Medication Sig Dispense Refill    triamterene-hydroCHLOROthiazide (MAXZIDE-25) 37.5-25 MG per tablet TAKE 1/2 TABLET DAILY 45 tablet 1    pregabalin (LYRICA) 50 MG capsule Take 1 capsule by mouth 2 times daily for 90 days.  180 capsule 0    atorvastatin (LIPITOR) 20 MG tablet TAKE 1 TABLET DAILY 90 tablet 1    Magnesium 400 MG CAPS Take 400 mg by mouth in the morning and at bedtime      isosorbide mononitrate (IMDUR) 30 MG extended release tablet Take 1 tablet by mouth daily 90 tablet 3    nitroGLYCERIN (NITROSTAT) 0.4 MG SL tablet up to max of 3 total doses. If no relief after 1 dose, call 911. 25 tablet 3    aspirin 81 MG EC tablet Take 1 tablet by mouth daily 90 tablet 3    DENTA 5000 PLUS 1.1 % CREA Take 1 Dose by mouth nightly  3    Omega-3 Fatty Acids (FISH OIL) 1000 MG CAPS Take 3,000 mg by mouth 2 times daily       Calcium Carbonate-Vitamin D (CALCIUM 600+D HIGH POTENCY PO) Take by mouth      Multiple Vitamins-Minerals (HAIR SKIN AND NAILS FORMULA PO) Take by mouth      simethicone (MYLICON) 80 MG chewable tablet Take 80 mg by mouth every 6 hours as needed for Flatulence      Lactase (LACTAID PO) Take by mouth as needed        No current facility-administered medications for this visit. ALLERGIES  Allergies   Allergen Reactions    Clindamycin/Lincomycin Swelling     Lips and tongue    Gabapentin Nausea Only and Other (See Comments)     Dizziness/nausea    Methylprednisolone Rash     Red face       PHYSICAL EXAM    /62 (Site: Left Upper Arm, Position: Sitting, Cuff Size: Medium Adult)   Pulse 58   Resp 14   Ht 5' 4\" (1.626 m)   Wt 159 lb 14.4 oz (72.5 kg)   SpO2 98%   BMI 27.45 kg/m²     Physical Exam  Vitals and nursing note reviewed. Constitutional:       Appearance: She is well-developed. HENT:      Head: Normocephalic and atraumatic. Nose: Nose normal.      Mouth/Throat:      Mouth: Mucous membranes are moist.   Eyes:      Pupils: Pupils are equal, round, and reactive to light. Cardiovascular:      Rate and Rhythm: Normal rate and regular rhythm. Heart sounds: Normal heart sounds. Pulmonary:      Effort: Pulmonary effort is normal. No respiratory distress. Breath sounds: Normal breath sounds. No rhonchi or rales. Abdominal:      Palpations: Abdomen is soft.    Musculoskeletal:         General: No deformity. Normal range of motion. Cervical back: Normal range of motion and neck supple. Skin:     General: Skin is warm and dry. Coloration: Skin is not jaundiced. Findings: No bruising. Neurological:      General: No focal deficit present. Mental Status: She is alert and oriented to person, place, and time. Mental status is at baseline. Cranial Nerves: No cranial nerve deficit. Psychiatric:         Mood and Affect: Mood normal.         Behavior: Behavior normal.         Thought Content: Thought content normal.       ASSESSMENT & PLAN     Diagnosis Orders   1. Pain in both feet        2. Other osteoarthritis of spine, lumbar region        3. Stage 3a chronic kidney disease ([facility] [address]  Basic Metabolic Panel      4. Gastroesophageal reflux disease without esophagitis        5. Benign essential HTN        6. Pure hypercholesterolemia        7. Anxiety state          Patient to continue on same regimen refills will be provided. I believe her foot paresthesias are improved with Rx. Overall doing quite well. Questions answered and encouragement given. Patient with CKD- 3  - we will recheck BMP today and refills provided on mild diuretic. Return in about 3 months (around 1/10/2023). Continue to work on healthy lifestyle call with issues or changes.          Electronically signed by Carmen Urias MD on 10/10/2022

## 2023-01-09 ENCOUNTER — OFFICE VISIT (OUTPATIENT)
Dept: FAMILY MEDICINE CLINIC | Age: 73
End: 2023-01-09
Payer: MEDICARE

## 2023-01-09 VITALS
RESPIRATION RATE: 16 BRPM | BODY MASS INDEX: 26.98 KG/M2 | OXYGEN SATURATION: 100 % | DIASTOLIC BLOOD PRESSURE: 76 MMHG | WEIGHT: 158 LBS | HEART RATE: 65 BPM | SYSTOLIC BLOOD PRESSURE: 134 MMHG | HEIGHT: 64 IN

## 2023-01-09 DIAGNOSIS — M79.671 PAIN IN BOTH FEET: ICD-10-CM

## 2023-01-09 DIAGNOSIS — E78.00 PURE HYPERCHOLESTEROLEMIA: ICD-10-CM

## 2023-01-09 DIAGNOSIS — N18.30 STAGE 3 CHRONIC KIDNEY DISEASE, UNSPECIFIED WHETHER STAGE 3A OR 3B CKD (HCC): ICD-10-CM

## 2023-01-09 DIAGNOSIS — T14.8XXA BRUISING: ICD-10-CM

## 2023-01-09 DIAGNOSIS — I83.893 VARICOSE VEINS OF BILATERAL LOWER EXTREMITIES WITH OTHER COMPLICATIONS: ICD-10-CM

## 2023-01-09 DIAGNOSIS — F41.1 ANXIETY STATE: Primary | ICD-10-CM

## 2023-01-09 DIAGNOSIS — I10 BENIGN ESSENTIAL HTN: ICD-10-CM

## 2023-01-09 DIAGNOSIS — K21.9 GASTROESOPHAGEAL REFLUX DISEASE WITHOUT ESOPHAGITIS: ICD-10-CM

## 2023-01-09 DIAGNOSIS — M79.672 PAIN IN BOTH FEET: ICD-10-CM

## 2023-01-09 LAB
A/G RATIO: 2 (ref 1.1–2.2)
ALBUMIN SERPL-MCNC: 4.3 G/DL (ref 3.4–5)
ALP BLD-CCNC: 102 U/L (ref 40–129)
ALT SERPL-CCNC: 15 U/L (ref 10–40)
ANION GAP SERPL CALCULATED.3IONS-SCNC: 15 MMOL/L (ref 3–16)
APTT: 34.6 SEC (ref 23–34.3)
AST SERPL-CCNC: 20 U/L (ref 15–37)
BILIRUB SERPL-MCNC: 0.7 MG/DL (ref 0–1)
BUN BLDV-MCNC: 20 MG/DL (ref 7–20)
CALCIUM SERPL-MCNC: 10.4 MG/DL (ref 8.3–10.6)
CHLORIDE BLD-SCNC: 101 MMOL/L (ref 99–110)
CHOLESTEROL, TOTAL: 177 MG/DL (ref 0–199)
CO2: 25 MMOL/L (ref 21–32)
CREAT SERPL-MCNC: 1.2 MG/DL (ref 0.6–1.2)
GFR SERPL CREATININE-BSD FRML MDRD: 48 ML/MIN/{1.73_M2}
GLUCOSE BLD-MCNC: 87 MG/DL (ref 70–99)
HCT VFR BLD CALC: 35.7 % (ref 36–48)
HDLC SERPL-MCNC: 78 MG/DL (ref 40–60)
HEMOGLOBIN: 12.2 G/DL (ref 12–16)
INR BLD: 0.98 (ref 0.87–1.14)
LDL CHOLESTEROL CALCULATED: 75 MG/DL
MCH RBC QN AUTO: 31.7 PG (ref 26–34)
MCHC RBC AUTO-ENTMCNC: 34.2 G/DL (ref 31–36)
MCV RBC AUTO: 92.6 FL (ref 80–100)
PDW BLD-RTO: 14 % (ref 12.4–15.4)
PLATELET # BLD: 203 K/UL (ref 135–450)
PMV BLD AUTO: 9.1 FL (ref 5–10.5)
POTASSIUM SERPL-SCNC: 4 MMOL/L (ref 3.5–5.1)
PROTHROMBIN TIME: 12.9 SEC (ref 11.7–14.5)
RBC # BLD: 3.86 M/UL (ref 4–5.2)
SODIUM BLD-SCNC: 141 MMOL/L (ref 136–145)
TOTAL PROTEIN: 6.5 G/DL (ref 6.4–8.2)
TRIGL SERPL-MCNC: 119 MG/DL (ref 0–150)
VLDLC SERPL CALC-MCNC: 24 MG/DL
WBC # BLD: 6.4 K/UL (ref 4–11)

## 2023-01-09 PROCEDURE — 3078F DIAST BP <80 MM HG: CPT | Performed by: FAMILY MEDICINE

## 2023-01-09 PROCEDURE — 3075F SYST BP GE 130 - 139MM HG: CPT | Performed by: FAMILY MEDICINE

## 2023-01-09 PROCEDURE — 1123F ACP DISCUSS/DSCN MKR DOCD: CPT | Performed by: FAMILY MEDICINE

## 2023-01-09 PROCEDURE — 99214 OFFICE O/P EST MOD 30 MIN: CPT | Performed by: FAMILY MEDICINE

## 2023-01-09 RX ORDER — TRIAMTERENE AND HYDROCHLOROTHIAZIDE 37.5; 25 MG/1; MG/1
TABLET ORAL
Qty: 45 TABLET | Refills: 1 | Status: SHIPPED | OUTPATIENT
Start: 2023-01-09

## 2023-01-09 RX ORDER — ATORVASTATIN CALCIUM 20 MG/1
TABLET, FILM COATED ORAL
Qty: 90 TABLET | Refills: 1 | Status: SHIPPED | OUTPATIENT
Start: 2023-01-09

## 2023-01-09 ASSESSMENT — ENCOUNTER SYMPTOMS
VOMITING: 0
SHORTNESS OF BREATH: 0
EYE PAIN: 0
ABDOMINAL PAIN: 0
NAUSEA: 0
TROUBLE SWALLOWING: 0
BLOOD IN STOOL: 0
CHEST TIGHTNESS: 0
WHEEZING: 0
DIARRHEA: 0

## 2023-01-09 ASSESSMENT — PATIENT HEALTH QUESTIONNAIRE - PHQ9
SUM OF ALL RESPONSES TO PHQ QUESTIONS 1-9: 0
SUM OF ALL RESPONSES TO PHQ QUESTIONS 1-9: 0
SUM OF ALL RESPONSES TO PHQ9 QUESTIONS 1 & 2: 0
1. LITTLE INTEREST OR PLEASURE IN DOING THINGS: 0
2. FEELING DOWN, DEPRESSED OR HOPELESS: 0
SUM OF ALL RESPONSES TO PHQ QUESTIONS 1-9: 0
SUM OF ALL RESPONSES TO PHQ QUESTIONS 1-9: 0
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
## 2023-01-18 LAB — MAMMOGRAPHY, EXTERNAL: NORMAL

## 2023-01-23 ENCOUNTER — OFFICE VISIT (OUTPATIENT)
Dept: CARDIOLOGY CLINIC | Age: 73
End: 2023-01-23
Payer: MEDICARE

## 2023-01-23 VITALS
DIASTOLIC BLOOD PRESSURE: 72 MMHG | HEIGHT: 64 IN | BODY MASS INDEX: 26.8 KG/M2 | WEIGHT: 157 LBS | HEART RATE: 65 BPM | SYSTOLIC BLOOD PRESSURE: 130 MMHG

## 2023-01-23 DIAGNOSIS — I20.9 ANGINA PECTORIS, UNSPECIFIED (HCC): Primary | ICD-10-CM

## 2023-01-23 DIAGNOSIS — E78.00 PURE HYPERCHOLESTEROLEMIA: ICD-10-CM

## 2023-01-23 DIAGNOSIS — Z82.49 FAMILY HISTORY OF EARLY CAD: ICD-10-CM

## 2023-01-23 DIAGNOSIS — R42 DIZZINESS: ICD-10-CM

## 2023-01-23 PROCEDURE — 93000 ELECTROCARDIOGRAM COMPLETE: CPT | Performed by: INTERNAL MEDICINE

## 2023-01-23 PROCEDURE — 1123F ACP DISCUSS/DSCN MKR DOCD: CPT | Performed by: INTERNAL MEDICINE

## 2023-01-23 PROCEDURE — 99213 OFFICE O/P EST LOW 20 MIN: CPT | Performed by: INTERNAL MEDICINE

## 2023-01-23 PROCEDURE — 3075F SYST BP GE 130 - 139MM HG: CPT | Performed by: INTERNAL MEDICINE

## 2023-01-23 PROCEDURE — 3078F DIAST BP <80 MM HG: CPT | Performed by: INTERNAL MEDICINE

## 2023-01-23 RX ORDER — NITROGLYCERIN 0.4 MG/1
TABLET SUBLINGUAL
Qty: 25 TABLET | Refills: 3 | Status: SHIPPED | OUTPATIENT
Start: 2023-01-23

## 2023-01-23 RX ORDER — ISOSORBIDE MONONITRATE 30 MG/1
30 TABLET, EXTENDED RELEASE ORAL DAILY
Qty: 90 TABLET | Refills: 3 | Status: SHIPPED | OUTPATIENT
Start: 2023-01-23

## 2023-01-23 NOTE — ASSESSMENT & PLAN NOTE
Reports occasional and random not disabling. If this becomes more frequent she needs to call us so we can do further work-up and cardiac monitoring.

## 2023-01-23 NOTE — ASSESSMENT & PLAN NOTE
Very atypical by description. 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   Continue as needed nitroglycerin

## 2023-01-23 NOTE — ASSESSMENT & PLAN NOTE
LDL is reported 75 on January 9, 2023 and she has HDL of 78. She is on Lipitor 20 mg daily continue the same.

## 2023-01-23 NOTE — PATIENT INSTRUCTIONS
Continue current cardiovascular medications which have been reviewed and discussed individually with you. Counseled for 30 minutes a day of moderate intensity aerobic exercise like activity. Appropriate prescriptions if needed on this visit are addressed. After visit summery is provided. Questions answered and patient verbalizes understanding. Follow up in 12 months with ECG,  sooner if needed.

## 2023-01-23 NOTE — PROGRESS NOTES
Jess Forte  1950  Inge Marks MD      Chief Complaint   Patient presents with    Hypertension    Follow-up     OV for 1 year check. Pt denies any CP,SOB,some dizziness sometimes and some swelling to legs sometimes, no palpitations. Chief complaint and HPI:  Jess Forte  is a 67 y.o. female following up for Hyperlipidemia and atypical angina and strong family history of coronary artery disease. She states she had 1 episode of angina about 9 months ago which she describes as a pounding in the chest which woke her up from the sleep and she took 1 nitroglycerin with relief. During the summertime she is active with outdoor activities but during the wintertime she is not exercising and has not had any recurrence of angina. She states that she was told that she had significant heart murmur by other physician several years ago. We reviewed her last echocardiogram from  and there was no evidence of any significant valvular heart disease. She is somewhat anxious. She is compliant with her medications. She states occasionally she feels dizzy but has never had bad enough to faint or pass out. She brought her home blood pressure readings to be verified and they are all in the fairly decent range. Systolic blood pressure mostly ranges between 106 and 133  millimeter of mercury and heart rate ranged from 55 to 65 bpm.    Rest of the Cardiovascular system review is otherwise unchanged from prior encounter. Past medical history:  has a past medical history of Anxiety state, Arthritis, Benign essential HTN, Breast lump, Bunion of great toe of right foot, CKD (chronic kidney disease), stage II, Colon polyp, Family history of early CAD, GERD (gastroesophageal reflux disease), History of nuclear stress test, Hyperlipidemia, Internal hemorrhoids, Irritable bowel syndrome, Nausea & vomiting, Osteoarthritis, and Rosacea.   Past surgical history:  has a past surgical history that includes  section (1976); Tubal ligation (1982); Appendectomy (1963); Breast surgery (2002); Cholecystectomy (1989); Cardiac catheterization (1988); tumor removal; Foot Tendon Surgery; Bunionectomy (Left); Foot surgery; and Colonoscopy (04/25/2018). Social History:   Social History     Tobacco Use    Smoking status: Never    Smokeless tobacco: Never   Substance Use Topics    Alcohol use: No     Family history: family history includes Other (age of onset: 22) in her son. ALLERGIES:  Clindamycin/lincomycin, Gabapentin, and Methylprednisolone  Prior to Admission medications    Medication Sig Start Date End Date Taking? Authorizing Provider   isosorbide mononitrate (IMDUR) 30 MG extended release tablet Take 1 tablet by mouth daily 1/23/23  Yes Alyson Santos MD   nitroGLYCERIN (NITROSTAT) 0.4 MG SL tablet up to max of 3 total doses.  If no relief after 1 dose, call 911. 1/23/23  Yes Alyson Santos MD   triamterene-hydroCHLOROthiazide [facility]) 37.5-25 MG per tablet TAKE 1/2 TABLET DAILY 1/9/23  Yes Isiah Macario MD   atorvastatin (LIPITOR) 20 MG tablet TAKE 1 TABLET DAILY 1/9/23  Yes Isiah Macario MD   Magnesium 400 MG CAPS Take 400 mg by mouth in the morning and at bedtime   Yes Historical Provider, MD   aspirin 81 MG EC tablet Take 1 tablet by mouth daily 7/21/20  Yes Alyson Santos MD   DENTA 5000 PLUS 1.1 % CREA Take 1 Dose by mouth nightly 10/14/19  Yes Historical Provider, MD   Omega-3 Fatty Acids (FISH OIL) 1000 MG CAPS Take 3,000 mg by mouth 2 times daily    Yes Historical Provider, MD   Calcium Carbonate-Vitamin D (CALCIUM 600+D HIGH POTENCY PO) Take by mouth   Yes Historical Provider, MD   Multiple Vitamins-Minerals (HAIR SKIN AND NAILS FORMULA PO) Take by mouth   Yes Historical Provider, MD   simethicone (MYLICON) 80 MG chewable tablet Take 80 mg by mouth every 6 hours as needed for Flatulence   Yes Historical Provider, MD   Lactase (LACTAID PO) Take by mouth as needed    Yes Historical Provider, MD   pregabalin (LYRICA) 50 MG capsule Take 1 capsule by mouth 2 times daily for 90 days. 10/7/22 1/9/23  Jazlyn Carmichael MD     Vitals:    01/23/23 0902   BP: 130/72   Pulse: 65   Weight: 157 lb (71.2 kg)   Height: 5' 4\" (1.626 m)      Body mass index is 26.95 kg/m². Wt Readings from Last 3 Encounters:   01/23/23 157 lb (71.2 kg)   01/09/23 158 lb (71.7 kg)   10/10/22 159 lb 14.4 oz (72.5 kg)     Constitutional:  Patient is normally built and nourished female in no apparent distress. HEENT: Wearing glasses . Cardiovascular: Auscultation: Normal S1 and S2. No significant murmurs or gallops noted. Carotids are negative for bruits. Respiratory:  Respiratory effort is normal. Breath sounds are clear to auscultation. Extremities:  No edema, clubbing,  Cyanosis, petechiae. Abdomen:  No masses or tenderness. No organomegaly noted. Neurologic:  Oriented to time, place, and person and non-anxious. No focal neurological deficit noted. Psychiatric: Normal mood and effect    EKG today is consistent with normal sinus rhythm 65 bpm essentially normal EKG. LAB REVIEW:  CBC:   Lab Results   Component Value Date/Time    WBC 6.4 01/09/2023 10:22 AM    HGB 12.2 01/09/2023 10:22 AM    HCT 35.7 01/09/2023 10:22 AM     01/09/2023 10:22 AM     Lipids:   Lab Results   Component Value Date    CHOL 177 01/09/2023    TRIG 119 01/09/2023    HDL 78 (H) 01/09/2023    LDLCALC 75 01/09/2023     Renal:   Lab Results   Component Value Date/Time    BUN 20 01/09/2023 10:22 AM    CREATININE 1.2 01/09/2023 10:22 AM     01/09/2023 10:22 AM    K 4.0 01/09/2023 10:22 AM     PT/INR:   Lab Results   Component Value Date/Time    INR 0.98 01/09/2023 10:22 AM     IMPRESSION and RECOMMENDATIONS:      1. Angina pectoris, unspecified ([address]  Assessment & Plan:  Very atypical by description. 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 Continue as needed nitroglycerin   2.  Dizziness  Assessment & Plan:  Reports occasional and random not disabling.  If this becomes more frequent she needs to call us so we can do further work-up and cardiac monitoring.   Orders:  -     EKG 12 lead; Future  3. Family history of early CAD  Assessment & Plan:  Continue aggressive risk factor modification for primary prevention.   4. Pure hypercholesterolemia  Assessment & Plan:  LDL is reported 75 on January 9, 2023 and she has HDL of 78.  She is on Lipitor 20 mg daily continue the same.     Continue current cardiovascular medications which have been reviewed and discussed individually with you. Counseled for 30 minutes a day of moderate intensity aerobic exercise like activity.  Appropriate prescriptions if needed on this visit are addressed. After visit summery is provided.   Questions answered and patient verbalizes understanding. Follow up in 12 months with ECG,  sooner if needed.    Lloyd Whitehead MD, 1/23/2023 11:17 AM     Please note this report has been partially produced using speech recognition software and may contain errors related to that system including errors in grammar, punctuation, and spelling, as well as words and phrases that may be inappropriate. If there are any questions or concerns please feel free to contact the dictating provider for clarification.

## 2023-03-02 ENCOUNTER — TELEPHONE (OUTPATIENT)
Dept: FAMILY MEDICINE CLINIC | Age: 73
End: 2023-03-02

## 2023-04-25 PROBLEM — I83.891 VARICOSE VEINS OF LOWER EXTREMITIES WITH COMPLICATIONS, RIGHT: Status: ACTIVE | Noted: 2023-04-25

## 2023-06-06 ENCOUNTER — TELEPHONE (OUTPATIENT)
Dept: FAMILY MEDICINE CLINIC | Age: 73
End: 2023-06-06

## 2023-06-06 PROBLEM — I83.893 VARICOSE VEINS OF LOWER EXTREMITIES WITH COMPLICATIONS, BILATERAL: Status: ACTIVE | Noted: 2023-06-06

## 2023-06-06 PROBLEM — Z98.890 STATUS POST ENDOVENOUS RADIOFREQUENCY ABLATION (RFA) OF SAPHENOUS VEIN: Status: ACTIVE | Noted: 2023-06-06

## 2023-06-06 NOTE — TELEPHONE ENCOUNTER
----- Message from Annabelle Connie sent at 6/6/2023  3:29 PM EDT -----  Subject: Referral Request    Reason for referral request? The patient is requesting a referral to   orthopedics. She believes she saw a Dr Paulino Storey about 10 years ago please   contact the patient about this request  Provider patient wants to be referred to(if known):     Provider Phone Number(if known):     Additional Information for Provider?   ---------------------------------------------------------------------------  --------------  [address]    [phone]; OK to leave message on voicemail  ---------------------------------------------------------------------------  --------------

## 2023-07-10 ENCOUNTER — OFFICE VISIT (OUTPATIENT)
Dept: FAMILY MEDICINE CLINIC | Age: 73
End: 2023-07-10
Payer: MEDICARE

## 2023-07-10 VITALS
OXYGEN SATURATION: 98 % | SYSTOLIC BLOOD PRESSURE: 122 MMHG | DIASTOLIC BLOOD PRESSURE: 68 MMHG | HEART RATE: 72 BPM | HEIGHT: 64 IN | BODY MASS INDEX: 25.95 KG/M2 | WEIGHT: 152 LBS | RESPIRATION RATE: 16 BRPM

## 2023-07-10 DIAGNOSIS — K21.9 GASTROESOPHAGEAL REFLUX DISEASE WITHOUT ESOPHAGITIS: ICD-10-CM

## 2023-07-10 DIAGNOSIS — N18.30 STAGE 3 CHRONIC KIDNEY DISEASE, UNSPECIFIED WHETHER STAGE 3A OR 3B CKD (HCC): ICD-10-CM

## 2023-07-10 DIAGNOSIS — M47.896 OTHER OSTEOARTHRITIS OF SPINE, LUMBAR REGION: ICD-10-CM

## 2023-07-10 DIAGNOSIS — F41.1 ANXIETY STATE: ICD-10-CM

## 2023-07-10 DIAGNOSIS — M10.9 ACUTE GOUT OF FOOT, UNSPECIFIED CAUSE, UNSPECIFIED LATERALITY: ICD-10-CM

## 2023-07-10 DIAGNOSIS — I10 BENIGN ESSENTIAL HTN: Primary | ICD-10-CM

## 2023-07-10 DIAGNOSIS — E78.00 PURE HYPERCHOLESTEROLEMIA: ICD-10-CM

## 2023-07-10 DIAGNOSIS — I10 BENIGN ESSENTIAL HTN: ICD-10-CM

## 2023-07-10 LAB
ALBUMIN SERPL-MCNC: 4.3 G/DL (ref 3.4–5)
ALBUMIN/GLOB SERPL: 2 {RATIO} (ref 1.1–2.2)
ALP SERPL-CCNC: 98 U/L (ref 40–129)
ALT SERPL-CCNC: 11 U/L (ref 10–40)
ANION GAP SERPL CALCULATED.3IONS-SCNC: 14 MMOL/L (ref 3–16)
AST SERPL-CCNC: 17 U/L (ref 15–37)
BILIRUB SERPL-MCNC: 0.6 MG/DL (ref 0–1)
BUN SERPL-MCNC: 17 MG/DL (ref 7–20)
CALCIUM SERPL-MCNC: 9.8 MG/DL (ref 8.3–10.6)
CHLORIDE SERPL-SCNC: 103 MMOL/L (ref 99–110)
CHOLEST SERPL-MCNC: 153 MG/DL (ref 0–199)
CO2 SERPL-SCNC: 24 MMOL/L (ref 21–32)
CREAT SERPL-MCNC: 1.6 MG/DL (ref 0.6–1.2)
DEPRECATED RDW RBC AUTO: 13.4 % (ref 12.4–15.4)
GFR SERPLBLD CREATININE-BSD FMLA CKD-EPI: 34 ML/MIN/{1.73_M2}
GLUCOSE SERPL-MCNC: 91 MG/DL (ref 70–99)
HCT VFR BLD AUTO: 33 % (ref 36–48)
HDLC SERPL-MCNC: 71 MG/DL (ref 40–60)
HGB BLD-MCNC: 11.5 G/DL (ref 12–16)
LDLC SERPL CALC-MCNC: 65 MG/DL
MCH RBC QN AUTO: 31.7 PG (ref 26–34)
MCHC RBC AUTO-ENTMCNC: 34.9 G/DL (ref 31–36)
MCV RBC AUTO: 90.8 FL (ref 80–100)
PLATELET # BLD AUTO: 225 K/UL (ref 135–450)
PMV BLD AUTO: 8.6 FL (ref 5–10.5)
POTASSIUM SERPL-SCNC: 4.7 MMOL/L (ref 3.5–5.1)
PROT SERPL-MCNC: 6.5 G/DL (ref 6.4–8.2)
RBC # BLD AUTO: 3.63 M/UL (ref 4–5.2)
SODIUM SERPL-SCNC: 141 MMOL/L (ref 136–145)
TRIGL SERPL-MCNC: 86 MG/DL (ref 0–150)
URATE SERPL-MCNC: 6.5 MG/DL (ref 2.6–6)
VLDLC SERPL CALC-MCNC: 17 MG/DL
WBC # BLD AUTO: 5.8 K/UL (ref 4–11)

## 2023-07-10 PROCEDURE — 3074F SYST BP LT 130 MM HG: CPT | Performed by: FAMILY MEDICINE

## 2023-07-10 PROCEDURE — 3078F DIAST BP <80 MM HG: CPT | Performed by: FAMILY MEDICINE

## 2023-07-10 PROCEDURE — 1123F ACP DISCUSS/DSCN MKR DOCD: CPT | Performed by: FAMILY MEDICINE

## 2023-07-10 PROCEDURE — 99214 OFFICE O/P EST MOD 30 MIN: CPT | Performed by: FAMILY MEDICINE

## 2023-07-10 RX ORDER — ATORVASTATIN CALCIUM 20 MG/1
TABLET, FILM COATED ORAL
Qty: 90 TABLET | Refills: 1 | Status: SHIPPED | OUTPATIENT
Start: 2023-07-10

## 2023-07-10 RX ORDER — TRIAMTERENE AND HYDROCHLOROTHIAZIDE 37.5; 25 MG/1; MG/1
TABLET ORAL
Qty: 45 TABLET | Refills: 1 | Status: SHIPPED | OUTPATIENT
Start: 2023-07-10

## 2023-07-10 ASSESSMENT — ENCOUNTER SYMPTOMS
NAUSEA: 0
WHEEZING: 0
EYE PAIN: 0
CHEST TIGHTNESS: 0
SHORTNESS OF BREATH: 0
TROUBLE SWALLOWING: 0
ABDOMINAL PAIN: 0
DIARRHEA: 0
VOMITING: 0
BLOOD IN STOOL: 0

## 2023-07-12 ENCOUNTER — HOSPITAL ENCOUNTER (OUTPATIENT)
Dept: GENERAL RADIOLOGY | Age: 73
Discharge: HOME | End: 2023-07-12
Payer: MEDICARE

## 2023-07-12 ENCOUNTER — HOSPITAL ENCOUNTER (OUTPATIENT)
Age: 73
Discharge: HOME | End: 2023-07-12
Payer: MEDICARE

## 2023-07-12 ENCOUNTER — OFFICE VISIT (OUTPATIENT)
Dept: ORTHOPEDIC SURGERY | Age: 73
End: 2023-07-12

## 2023-07-12 VITALS — WEIGHT: 151.6 LBS | HEIGHT: 64 IN | HEART RATE: 64 BPM | OXYGEN SATURATION: 98 % | BODY MASS INDEX: 25.88 KG/M2

## 2023-07-12 DIAGNOSIS — M54.12 CERVICAL RADICULOPATHY: ICD-10-CM

## 2023-07-12 DIAGNOSIS — M54.12 CERVICAL RADICULOPATHY: Primary | ICD-10-CM

## 2023-07-12 PROCEDURE — 72040 X-RAY EXAM NECK SPINE 2-3 VW: CPT

## 2023-07-12 RX ORDER — SULFAMETHOXAZOLE AND TRIMETHOPRIM 800; 160 MG/1; MG/1
1 TABLET ORAL 2 TIMES DAILY
Qty: 20 TABLET | Refills: 0 | COMMUNITY
Start: 2023-07-07 | End: 2023-07-17

## 2023-07-12 ASSESSMENT — ENCOUNTER SYMPTOMS
COLOR CHANGE: 0
PHOTOPHOBIA: 0
VOMITING: 0
NAUSEA: 0
FACIAL SWELLING: 0
WHEEZING: 0
COUGH: 0
EYE PAIN: 0
EYE REDNESS: 0
SHORTNESS OF BREATH: 0
STRIDOR: 0
BACK PAIN: 0
ABDOMINAL PAIN: 0

## 2023-07-12 NOTE — PATIENT INSTRUCTIONS
Cervical spine x-rays ordered. [facility] to get [facility]-rays completed. EMG ordered. Please call to schedule. Follow up in office for results.

## 2023-07-13 ENCOUNTER — OFFICE VISIT (OUTPATIENT)
Dept: FAMILY MEDICINE CLINIC | Age: 73
End: 2023-07-13
Payer: MEDICARE

## 2023-07-13 VITALS
HEART RATE: 66 BPM | WEIGHT: 152.6 LBS | SYSTOLIC BLOOD PRESSURE: 126 MMHG | DIASTOLIC BLOOD PRESSURE: 62 MMHG | OXYGEN SATURATION: 98 % | HEIGHT: 64 IN | BODY MASS INDEX: 26.05 KG/M2

## 2023-07-13 DIAGNOSIS — Z00.00 MEDICARE ANNUAL WELLNESS VISIT, SUBSEQUENT: Primary | ICD-10-CM

## 2023-07-13 PROCEDURE — G0439 PPPS, SUBSEQ VISIT: HCPCS | Performed by: STUDENT IN AN ORGANIZED HEALTH CARE EDUCATION/TRAINING PROGRAM

## 2023-07-13 PROCEDURE — 1123F ACP DISCUSS/DSCN MKR DOCD: CPT | Performed by: STUDENT IN AN ORGANIZED HEALTH CARE EDUCATION/TRAINING PROGRAM

## 2023-07-13 PROCEDURE — 3074F SYST BP LT 130 MM HG: CPT | Performed by: STUDENT IN AN ORGANIZED HEALTH CARE EDUCATION/TRAINING PROGRAM

## 2023-07-13 PROCEDURE — 3078F DIAST BP <80 MM HG: CPT | Performed by: STUDENT IN AN ORGANIZED HEALTH CARE EDUCATION/TRAINING PROGRAM

## 2023-07-13 ASSESSMENT — PATIENT HEALTH QUESTIONNAIRE - PHQ9
SUM OF ALL RESPONSES TO PHQ QUESTIONS 1-9: 0
SUM OF ALL RESPONSES TO PHQ QUESTIONS 1-9: 0
1. LITTLE INTEREST OR PLEASURE IN DOING THINGS: 0
SUM OF ALL RESPONSES TO PHQ QUESTIONS 1-9: 0
SUM OF ALL RESPONSES TO PHQ9 QUESTIONS 1 & 2: 0
SUM OF ALL RESPONSES TO PHQ QUESTIONS 1-9: 0
2. FEELING DOWN, DEPRESSED OR HOPELESS: 0

## 2023-07-13 ASSESSMENT — LIFESTYLE VARIABLES
HOW MANY STANDARD DRINKS CONTAINING ALCOHOL DO YOU HAVE ON A TYPICAL DAY: PATIENT DOES NOT DRINK
HOW OFTEN DO YOU HAVE A DRINK CONTAINING ALCOHOL: NEVER

## 2023-07-14 PROBLEM — Z98.890 STATUS POST PHLEBECTOMY: Status: ACTIVE | Noted: 2023-07-14

## 2023-08-08 ENCOUNTER — PROCEDURE VISIT (OUTPATIENT)
Dept: PHYSICAL MEDICINE AND REHAB | Age: 73
End: 2023-08-08
Payer: MEDICARE

## 2023-08-08 DIAGNOSIS — R20.2 PARESTHESIA AND PAIN OF BOTH UPPER EXTREMITIES: ICD-10-CM

## 2023-08-08 DIAGNOSIS — M79.602 PARESTHESIA AND PAIN OF BOTH UPPER EXTREMITIES: ICD-10-CM

## 2023-08-08 DIAGNOSIS — G56.01 CARPAL TUNNEL SYNDROME OF RIGHT WRIST: Primary | ICD-10-CM

## 2023-08-08 DIAGNOSIS — M79.601 PARESTHESIA AND PAIN OF BOTH UPPER EXTREMITIES: ICD-10-CM

## 2023-08-08 PROCEDURE — 95911 NRV CNDJ TEST 9-10 STUDIES: CPT | Performed by: PHYSICAL MEDICINE & REHABILITATION

## 2023-08-08 PROCEDURE — 95886 MUSC TEST DONE W/N TEST COMP: CPT | Performed by: PHYSICAL MEDICINE & REHABILITATION

## 2023-08-14 ENCOUNTER — OFFICE VISIT (OUTPATIENT)
Dept: ORTHOPEDIC SURGERY | Age: 73
End: 2023-08-14
Payer: MEDICARE

## 2023-08-14 VITALS — BODY MASS INDEX: 26.26 KG/M2 | WEIGHT: 153.8 LBS | HEIGHT: 64 IN | HEART RATE: 53 BPM | OXYGEN SATURATION: 93 %

## 2023-08-14 DIAGNOSIS — M75.21 BICEPS TENDINITIS OF RIGHT UPPER EXTREMITY: Primary | ICD-10-CM

## 2023-08-14 PROCEDURE — 99213 OFFICE O/P EST LOW 20 MIN: CPT | Performed by: STUDENT IN AN ORGANIZED HEALTH CARE EDUCATION/TRAINING PROGRAM

## 2023-08-14 PROCEDURE — 1123F ACP DISCUSS/DSCN MKR DOCD: CPT | Performed by: STUDENT IN AN ORGANIZED HEALTH CARE EDUCATION/TRAINING PROGRAM

## 2023-08-14 PROCEDURE — 20610 DRAIN/INJ JOINT/BURSA W/O US: CPT | Performed by: STUDENT IN AN ORGANIZED HEALTH CARE EDUCATION/TRAINING PROGRAM

## 2023-08-14 RX ORDER — TRIAMCINOLONE ACETONIDE 40 MG/ML
40 INJECTION, SUSPENSION INTRA-ARTICULAR; INTRAMUSCULAR ONCE
Status: COMPLETED | OUTPATIENT
Start: 2023-08-14 | End: 2023-08-14

## 2023-08-14 RX ORDER — CYCLOBENZAPRINE HCL 5 MG
5 TABLET ORAL 2 TIMES DAILY PRN
Qty: 30 TABLET | Refills: 0 | Status: SHIPPED | OUTPATIENT
Start: 2023-08-14 | End: 2023-08-24

## 2023-08-14 RX ADMIN — TRIAMCINOLONE ACETONIDE 40 MG: 40 INJECTION, SUSPENSION INTRA-ARTICULAR; INTRAMUSCULAR at 11:07

## 2023-08-14 ASSESSMENT — ENCOUNTER SYMPTOMS
ABDOMINAL PAIN: 0
EYE PAIN: 0
VOMITING: 0
STRIDOR: 0
NAUSEA: 0
FACIAL SWELLING: 0
COUGH: 0
BACK PAIN: 0
EYE REDNESS: 0
COLOR CHANGE: 0
WHEEZING: 0
PHOTOPHOBIA: 0
SHORTNESS OF BREATH: 0

## 2023-08-14 NOTE — PATIENT INSTRUCTIONS
Continue to weight bear as tolerated  Continue range of motion  Ice and elevate as needed  Tylenol or Motrin for pain  Injection given into the right shoulder  No high impact activities for a least 24-48 hours  Follow up in 3 months      We are committed to providing you the best care possible. If you receive a survey after visiting one of our offices, please take time to share your experience concerning your physician office visit. These surveys are confidential and no health information about you is shared. We are eager to improve for you and we are counting on your feedback to help make that happen.

## 2023-08-14 NOTE — PROGRESS NOTES
Patient comes in today for EMG results and cervical spine X-rays. She was last seen in our office on 7/12/23. She reports a fall about 2 weeks ago. She states that she fell over her grandchild's toy and hit her head on the arm of the couch. She rates her pain at 4/10 today in her right thumb and bicep. EMG completed on 8/8/23  IMPRESSION:                  1.  Mildly abnormal EMG. 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 2.  No evidence of a focal cervical spinal nerve root lesion (radiculopathy), plexopathy, generalized neuropathy or primary muscle disease. Cervical spine x-rays completed on 7/12/23  IMPRESSION:  Degenerative changes of the lower cervical spine. IMPRESSION:  No findings of dynamic instability.

## 2023-08-14 NOTE — PROGRESS NOTES
8/14/2023   Chief Complaint   Patient presents with    Follow-up     EMG results        Updated HPI: Patient returns today for reevaluation of her right upper extremity as well as discuss EMG and cervical spine imaging results. No new injuries or complaint since last being seen. No changes in her health history. No changes in her symptoms. Continues to with pain in the right upper extremity mainly at the biceps region. Previous HPI (7/12/2023):                             Sondra Solorzano is a 75 y.o. female right handed patient referred by PCP for evaluation and treatment right shoulder pain. Patient denies any specific injury but states she has been having shoulder pain for approximately 5 to 6 months that has been somewhat increasing. She has pain throughout the upper arm and some even posteriorly into the shoulder that is diffuse in nature and hard to pinpoint. She does have associated neck stiffness. She states she gets a dull aching pain down the arm into her hand with associated numbness and tingling. She does get relief with putting her shoulder in certain positions. She has no advanced imaging thus far. Patient was provided with a muscle relaxer as well as a Medrol Dosepak which she recently completed and states that this did provide significant relief but is somewhat worn off. No advanced imaging to the cervical spine or right shoulder thus far. This is my first time seeing the patient. Patient denies any prior right shoulder or cervical spine injury although she has had several fatty tumors removed from her neck by general surgeon several years ago. She is also been told that she has osteoarthritis of her neck. The pain occurs every day and when active. Location of pain is diffusely and in a radicular type pattern in the right upper extremity. No history of dislocation. Symptoms are aggravated by ADL's, repetitive use.  Symptoms are diminished by rest, placing the arm in a

## 2023-08-15 DIAGNOSIS — G89.29 CHRONIC RIGHT SHOULDER PAIN: ICD-10-CM

## 2023-08-15 DIAGNOSIS — M62.838 TRAPEZIUS MUSCLE SPASM: ICD-10-CM

## 2023-08-15 DIAGNOSIS — M25.511 CHRONIC RIGHT SHOULDER PAIN: ICD-10-CM

## 2023-08-15 DIAGNOSIS — M79.12 STERNOCLEIDOMASTOID MUSCLE TENDERNESS: ICD-10-CM

## 2023-08-15 RX ORDER — TIZANIDINE 2 MG/1
2-4 TABLET ORAL 2 TIMES DAILY PRN
Qty: 40 TABLET | Refills: 0 | Status: SHIPPED | OUTPATIENT
Start: 2023-08-15

## 2023-11-15 ENCOUNTER — OFFICE VISIT (OUTPATIENT)
Dept: ORTHOPEDIC SURGERY | Age: 73
End: 2023-11-15
Payer: MEDICARE

## 2023-11-15 VITALS — DIASTOLIC BLOOD PRESSURE: 67 MMHG | OXYGEN SATURATION: 94 % | HEART RATE: 70 BPM | SYSTOLIC BLOOD PRESSURE: 130 MMHG

## 2023-11-15 DIAGNOSIS — M75.21 BICEPS TENDINITIS OF RIGHT UPPER EXTREMITY: Primary | ICD-10-CM

## 2023-11-15 PROCEDURE — 3075F SYST BP GE 130 - 139MM HG: CPT | Performed by: STUDENT IN AN ORGANIZED HEALTH CARE EDUCATION/TRAINING PROGRAM

## 2023-11-15 PROCEDURE — 99213 OFFICE O/P EST LOW 20 MIN: CPT | Performed by: STUDENT IN AN ORGANIZED HEALTH CARE EDUCATION/TRAINING PROGRAM

## 2023-11-15 PROCEDURE — 3078F DIAST BP <80 MM HG: CPT | Performed by: STUDENT IN AN ORGANIZED HEALTH CARE EDUCATION/TRAINING PROGRAM

## 2023-11-15 PROCEDURE — 1123F ACP DISCUSS/DSCN MKR DOCD: CPT | Performed by: STUDENT IN AN ORGANIZED HEALTH CARE EDUCATION/TRAINING PROGRAM

## 2023-11-15 ASSESSMENT — ENCOUNTER SYMPTOMS
WHEEZING: 0
STRIDOR: 0
PHOTOPHOBIA: 0
EYE PAIN: 0
COLOR CHANGE: 0
FACIAL SWELLING: 0
COUGH: 0
ABDOMINAL PAIN: 0
VOMITING: 0
EYE REDNESS: 0
BACK PAIN: 0
NAUSEA: 0
SHORTNESS OF BREATH: 0

## 2023-11-15 NOTE — PROGRESS NOTES
11/15/2023   Chief Complaint   Patient presents with    Follow-up     Follow up on bicipital groove injection 8/14/23     Updated HPI: Patient returns today for reevaluation of her right upper extremity. She states that she has been feeling excellent in regards to the right upper extremity and the injection provided substantial relief. She does not feel that she needs any additional injection today due to this. She has been having some left-sided paracervical spinal musculature pain and she is set to be seen by her PCP for this issue but she is having no radicular type symptoms including any Numbness or tingling. Previous HPI (8/14/2023): Patient returns today for reevaluation of her right upper extremity as well as discuss EMG and cervical spine imaging results. No new injuries or complaint since last being seen. No changes in her health history. No changes in her symptoms. Continues to with pain in the right upper extremity mainly at the biceps region. Previous HPI (7/12/2023):                             Pete Garcia is a 75 y.o. female right handed patient referred by PCP for evaluation and treatment right shoulder pain. Patient denies any specific injury but states she has been having shoulder pain for approximately 5 to 6 months that has been somewhat increasing. She has pain throughout the upper arm and some even posteriorly into the shoulder that is diffuse in nature and hard to pinpoint. She does have associated neck stiffness. She states she gets a dull aching pain down the arm into her hand with associated numbness and tingling. She does get relief with putting her shoulder in certain positions. She has no advanced imaging thus far. Patient was provided with a muscle relaxer as well as a Medrol Dosepak which she recently completed and states that this did provide significant relief but is somewhat worn off. No advanced imaging to the cervical spine or right shoulder thus far. [FreeTextEntry1] : FamilyHistory_20_twCiteListControlStart FamilyHistory_20_twCiteListControlEnd [id number] \par A four-generation family history was constructed and scanned into [employer]. \par Family history is significant for: \par siblings\par 13\par brother 66 yo, diagnosed at 58 aortic aneurysm now being repaired , hx afib: son x3 20-24 yo healthy \par brother 68 yo aortic aneurysm\par brother 66 aortic aneurysm\par brother 65 aortic aneurysm\par brother 63 aortic aneurysm \par brother  62\par brother 59\par \par \par sister 68 aortic aneurysm\par sister 64\par sister 63\par sister 61\par sister 57\par sister 54\par sister 52 \par \par multiple children grossly healthy \par \par \par Mother dec 89 yoCHF, later in life, hx afib in 60s\par Maternal aunts x1 dec 85 yo: 6 children\par Maternal uncles x1 dec car accident in his 30s: son,daughter \par Maternal Grandmother dec in 70s\par Maternal Grandfather dec 83 yo\par \par Father dec 90 yo\par Paternal aunts none\par Paternal uncles x2\par uncle dec 70s [place]: daughter, daughter, son\par uncle 80s daughter, daughter, son\par Paternal Grandfather dec 80s cause unk \par Paternal Grandmother dec ca in 70s\par \par children:2\par son 19 yo\par daughter 15 yo\par \par his maternal families originate from [place] since 1700[place]) and paternal families originate from  [place], [place] \par No Ashkenazi Mosque ancestry. \par Family history was positive/ negative for consanguinity  \par No family history of SIDS\par  \par \par  [FreeTextEntry2] : [facility] ([place] since [place]00[place]) [FreeTextEntry3] : [place]

## 2023-11-20 ENCOUNTER — HOSPITAL ENCOUNTER (OUTPATIENT)
Dept: GENERAL RADIOLOGY | Age: 73
Discharge: HOME | End: 2023-11-20
Payer: MEDICARE

## 2023-11-20 ENCOUNTER — HOSPITAL ENCOUNTER (OUTPATIENT)
Age: 73
Discharge: HOME | End: 2023-11-20
Payer: MEDICARE

## 2023-11-20 ENCOUNTER — OFFICE VISIT (OUTPATIENT)
Dept: FAMILY MEDICINE CLINIC | Age: 73
End: 2023-11-20
Payer: MEDICARE

## 2023-11-20 VITALS
RESPIRATION RATE: 14 BRPM | WEIGHT: 149.8 LBS | DIASTOLIC BLOOD PRESSURE: 78 MMHG | OXYGEN SATURATION: 96 % | HEIGHT: 64 IN | HEART RATE: 50 BPM | SYSTOLIC BLOOD PRESSURE: 162 MMHG | BODY MASS INDEX: 25.57 KG/M2

## 2023-11-20 DIAGNOSIS — M54.2 NECK PAIN: Primary | ICD-10-CM

## 2023-11-20 DIAGNOSIS — G44.329 CHRONIC POST-TRAUMATIC HEADACHE, NOT INTRACTABLE: ICD-10-CM

## 2023-11-20 DIAGNOSIS — Z91.81 HISTORY OF FALL: ICD-10-CM

## 2023-11-20 DIAGNOSIS — M54.2 NECK PAIN: ICD-10-CM

## 2023-11-20 DIAGNOSIS — I10 BENIGN ESSENTIAL HTN: ICD-10-CM

## 2023-11-20 PROCEDURE — 99214 OFFICE O/P EST MOD 30 MIN: CPT | Performed by: FAMILY MEDICINE

## 2023-11-20 PROCEDURE — 3078F DIAST BP <80 MM HG: CPT | Performed by: FAMILY MEDICINE

## 2023-11-20 PROCEDURE — 1123F ACP DISCUSS/DSCN MKR DOCD: CPT | Performed by: FAMILY MEDICINE

## 2023-11-20 PROCEDURE — 72040 X-RAY EXAM NECK SPINE 2-3 VW: CPT

## 2023-11-20 PROCEDURE — 3077F SYST BP >= 140 MM HG: CPT | Performed by: FAMILY MEDICINE

## 2023-11-20 ASSESSMENT — ENCOUNTER SYMPTOMS
EYE PAIN: 0
NAUSEA: 0
ABDOMINAL PAIN: 0
BLOOD IN STOOL: 0
VOMITING: 0
WHEEZING: 0
CHEST TIGHTNESS: 0
DIARRHEA: 0
SHORTNESS OF BREATH: 0
TROUBLE SWALLOWING: 0

## 2023-11-20 NOTE — PROGRESS NOTES
11/20/2023    Felecia Cerna    Chief Complaint   Patient presents with    Laci  two months ago. Left side neck pain goes down into left shoulder. Hurts to sit up out of the bed. Taking tylenol and tizanidine. Headache     Headache since the fall 2 months ago. Spot on left side of head where she hit the recliner is  to the touch. Blurred Vision     Little bit of blurriness since then. HPI  History was obtained from the patient. Keya Rose is a 75 y.o. female who presents today with history as recorded. And mechanical fall 2 months ago struck the left side of her head the temporal parietal area did not lose consciousness since that time she is experience symptoms as above most of the pain is in the left side of her neck beginning in the area of the suboccipital tubercle on the left- down into the trapezius muscle groups especially when turning her head to the right. The patient has had a little bit of visual disturbance (nonspecific) -probably should  have her vision checked by her eye specialist.  Blood pressure appears up a bit today but was normal a couple days ago at a previous doctor visit. Patient denies other focal neurologic changes. Mood remains positive/ meds are otherwise tolerated. She is having headache and tenderness to the area of the head trauma. She had not received medical attention after that previous fall. Francine Yost REVIEW OF SYMPTOMS    Review of Systems   Constitutional:  Negative for activity change and fatigue. HENT:  Negative for congestion, hearing loss, mouth sores and trouble swallowing. Eyes:  Positive for visual disturbance. Negative for pain. Respiratory:  Negative for chest tightness, shortness of breath and wheezing. Cardiovascular:  Negative for chest pain and palpitations. Gastrointestinal:  Negative for abdominal pain, blood in stool, diarrhea, nausea and vomiting. Genitourinary:  Negative for dysuria, frequency and urgency.

## 2023-11-29 ENCOUNTER — HOSPITAL ENCOUNTER (OUTPATIENT)
Dept: CT IMAGING | Age: 73
Discharge: HOME | End: 2023-11-29
Payer: MEDICARE

## 2023-11-29 DIAGNOSIS — G44.329 CHRONIC POST-TRAUMATIC HEADACHE, NOT INTRACTABLE: ICD-10-CM

## 2023-11-29 PROCEDURE — 70450 CT HEAD/BRAIN W/O DYE: CPT

## 2023-12-06 ENCOUNTER — HOSPITAL ENCOUNTER (OUTPATIENT)
Dept: PHYSICAL THERAPY | Age: 73
Setting detail: THERAPIES SERIES
Discharge: HOME | End: 2023-12-06
Payer: MEDICARE

## 2023-12-06 PROCEDURE — 97110 THERAPEUTIC EXERCISES: CPT

## 2023-12-06 PROCEDURE — 97162 PT EVAL MOD COMPLEX 30 MIN: CPT

## 2023-12-06 ASSESSMENT — PAIN SCALES - GENERAL: PAINLEVEL_OUTOF10: 2

## 2023-12-06 NOTE — PROGRESS NOTES
Treatment may include any combination of the following: Current Treatment Recommendations: Strengthening, ROM, Functional mobility training, IADL training, Neuromuscular re-education, Manual, Pain management, Home exercise program, Safety education & training, Patient/Caregiver education & training, Modalities, Therapeutic activities     Frequency / Duration:  Patient to be seen 1 for 6 weeks      Eval Complexity:    Decision Making: Medium Complexity          Therapist Signature: Lead Da Silva PT, DPT, Cert. DN     Date: 49/9/2226     I certify that the above Therapy Services are being furnished while the patient is under my care. I agree with the treatment plan and certify that this therapy is necessary. Physician's Signature:  ___________________________   Date:_______                                                                   Palmer Martinez, *        Physician Comments: _______________________________________________    Please sign and return to [address] [place] [address]. Please fax to the location listed below.  [address] [place] [address] for this referral!    [address], [address] [place] [address]  Dept: [phone]  Dept Fax: [phone]  Loc: [phone]       POC NOTE

## 2023-12-06 NOTE — FLOWSHEET NOTE
increased pain and increased headaches. Pt would benefit from continued skilled physical therapy to address impairments and limitations, progress toward goal completion, promote independence with ADLs, and prevent further injury. Subjective:  See eval         Any changes in Ambulatory Summary Sheet? None        Objective:  See eval           Exercises: (No more than 4 columns)  L side neck   Exercise/Equipment 12/6/23 #1 Date Date           WARM UP       UBE               TABLE      UT/LS stretch  L UT only today x30\"      Scap squeezes 10x3\"     Chin tucks 10x5\"                    STANDING      Mid rows/ext       Cervical ext with BUE OH                                          PROPRIOCEPTION                                    MODALITIES                      Other Therapeutic Activities/Education:  HEP and importance of completion, POC and goals, anatomy and physiology related to condition        Home Exercise Program:  Issued, practiced and pt demo ability to perform on eval date  12/6: Access Code: [facility]  URL: [email or web address]  Date: 12/06/2023  Prepared by: Fredy Limon, PT, DPT, Cert. DN    Exercises  - Seated Cervical Sidebending Stretch  - 2 x daily - 7 x weekly - 2 sets - 30 hold  - Seated Scapular Retraction  - 2 x daily - 7 x weekly - 2 sets - 10 reps - 3 hold  - Seated Cervical Retraction  - 2 x daily - 7 x weekly - 2 sets - 10 reps - 5 hold      Manual Treatments:  none      Modalities:  none      Communication with other providers:  POC sent       Assessment:    Assessment: Pt is a 74 yo female that presents to therapy with complaints of L sided neck pain and headaches following a fall at home when she hit her head on the recliner. 3 months ago. Per imaging: Stable degenerative changes of the cervical spine. CT of head was WNL. she is having some blurred vision since the fall but has an appt with the eye doctor to see if its from her cataracts.  She stays busy by helping her 80 yo

## 2023-12-13 ENCOUNTER — HOSPITAL ENCOUNTER (OUTPATIENT)
Dept: PHYSICAL THERAPY | Age: 73
Setting detail: THERAPIES SERIES
Discharge: HOME | End: 2023-12-13
Payer: MEDICARE

## 2023-12-13 PROCEDURE — 97140 MANUAL THERAPY 1/> REGIONS: CPT

## 2023-12-13 PROCEDURE — 97110 THERAPEUTIC EXERCISES: CPT

## 2023-12-13 NOTE — FLOWSHEET NOTE
increased pain and increased headaches. Pt would benefit from continued skilled physical therapy to address impairments and limitations, progress toward goal completion, promote independence with ADLs, and prevent further injury. Subjective:  Pt stated a 2/10 pain today. Pt stated that she feels tight and sore in her neck (L UT area). Pt stated that she felt sore after last treatment. Pt stated that she has been doing her exercises at home and feels sore from them. Pt stated that she uses a heated neck massager at home after exercises. Any changes in Ambulatory Summary Sheet? None      Objective:  Tight L UT. Pt needed supervision-CGA for lifting mechanics today. Exercises: (No more than 4 columns)  L side neck   Exercise/Equipment 12/6/23 #1 12/13/23 #2 Date           WARM UP       UBE     2'/2'          TABLE      UT/LS stretch  L UT only today x30\"  B UT 2x30\"     Scap squeezes 10x3\" 10x3\"    Chin tucks 10x5\" 10x5\"    Cervical distractions  Man*             STANDING      Mid rows/ext   2x10 RTB    Cervical ext with BUE OH   10x     Body mechanics   Worked on using good body mechanics by picking up multiple  cones and   1# cuff weight from floor                                 PROPRIOCEPTION                                    MODALITIES                      Other Therapeutic Activities/Education: Pt was educated on the importance of body mechanics when lifting objects of various sizes and weights off of the floor. Home Exercise Program:  Issued, practiced and pt demo ability to perform on eval date  12/6: Access Code: [id number]: [employer]/  Date: 12/06/2023  Prepared by: Mike Eckert, PT, DPT, Cert.  DN    Exercises  - Seated Cervical Sidebending Stretch  - 2 x daily - 7 x weekly - 2 sets - 30 hold  - Seated Scapular Retraction  - 2 x daily - 7 x weekly - 2 sets - 10 reps - 3 hold  - Seated Cervical Retraction  - 2 x daily - 7 x weekly - 2 sets - 10 reps - 5

## 2023-12-27 ENCOUNTER — HOSPITAL ENCOUNTER (OUTPATIENT)
Dept: PHYSICAL THERAPY | Age: 73
Setting detail: THERAPIES SERIES
Discharge: HOME | End: 2023-12-27
Payer: MEDICARE

## 2023-12-27 PROCEDURE — 97110 THERAPEUTIC EXERCISES: CPT

## 2023-12-27 PROCEDURE — 97140 MANUAL THERAPY 1/> REGIONS: CPT

## 2023-12-27 NOTE — FLOWSHEET NOTE
functional mobility, increased pain and increased headaches. Pt would benefit from continued skilled physical therapy to address impairments and limitations, progress toward goal completion, promote independence with ADLs, and prevent further injury. Subjective:  Pt stated that her neck pain was 2/10,but that her R biceps was irritated after last visit. Pt rated R biceps pain at 4/10 today. Any changes in Ambulatory Summary Sheet? None      Objective: Pt was tender to palpation in R biceps. Exercises only done on L today. Exercises: (No more than 4 columns)  L side neck   Exercise/Equipment 12/6/23 #1 12/13/23 #2 12/20/2023 #3 12/27/2023 #4            WARM UP        UBE     2'/2' 2'/2' 2'/2'          TABLE       UT/LS stretch  L UT only today x30\"  B UT 2x30\"  man Man   Scap squeezes 10x3\" 10x3\" See below    Chin tucks 10x5\" 10x5\" Supine 10x 5\"  Supine 10x 5\"   Cervical distractions  Man* Man Man    B SA punch   2# 10 x 2  L only 2# 10x2   Concentric circles   2# 10 x 2 ea dir ea UE 2# 10 x 2 ea dir    STANDING       Mid rows/ext   2x10 RTB RTB  10 x 2 3\" row  RTB 10 x 2  L only RTB 10 x 2  L only rtb 10x2   Cervical ext with BUE OH   10x      Body mechanics   Worked on using good body mechanics by picking up multiple  cones and   1# cuff weight from floor Crate with 10#  from chair to table  10x   Cues for body mechanics    Carrying 5# DB for ea UE 1 full lap   --                                    PROPRIOCEPTION       Ball on wall    10x 2ea dir   ( 4 ways) --                               MODALITIES           HP to neck and CP to R biceps               Other Therapeutic Activities/Education: Pt was educated on the importance of body mechanics when lifting objects of various sizes and weights off of the floor. Home Exercise Program:  Issued, practiced and pt demo ability to perform on eval date  12/6: Access Code: [id number]: [email or web address]  Date: 12/06/2023  Prepared

## 2024-01-03 ENCOUNTER — HOSPITAL ENCOUNTER (OUTPATIENT)
Dept: PHYSICAL THERAPY | Age: 74
Setting detail: THERAPIES SERIES
Discharge: HOME | End: 2024-01-03
Payer: MEDICARE

## 2024-01-03 PROCEDURE — 97110 THERAPEUTIC EXERCISES: CPT

## 2024-01-03 PROCEDURE — 97140 MANUAL THERAPY 1/> REGIONS: CPT

## 2024-01-03 NOTE — FLOWSHEET NOTE
physical therapy to address impairments and limitations, progress toward goal completion, promote independence with ADLs, and prevent further injury. End pain: sore       Plan for Next Session:   Specific Instructions for Next Treatment: BUE strength, lifting mechanics, cervical ROM, STM paraspinals/UT, SOR, gentle cervical distraction    Time In / Time Out:  0940/1030  [employer]- new insurance     Approved for 10 visits from 1/3/24-2/29/24        [id number]  only       [id number]  not covered       Timed Code/Total Treatment Minutes:  40'/ 50' 2 man (23') 1 TE (17') ( CP x 10' no charge)     Next Progress Note due:  6th visit       Plan of Care Interventions:  [x] Therapeutic Exercise  [x] Modalities:  [x] Therapeutic Activity     [x] Ultrasound  [] Estim  [] Gait Training      [] Cervical Traction [] Lumbar Traction  [x] Neuromuscular Re-education    [x] Cold/hotpack [] Iontophoresis   [x] Instruction in HEP      [] Vasopneumatic   [x] Dry Needling    [x] Manual Therapy               [] Aquatic Therapy              Electronically signed by: Leonor Lai PTA 1/3/2024, 8:41 AM        1/3/2024,3:09 PM

## 2024-01-03 NOTE — PRE-CERTIFICATION NOTE
[employer]- new insurance    Approved for 10 visits from 1/3/24-2/29/24      [id number]  only      [id number]  not covered

## 2024-01-10 ENCOUNTER — OFFICE VISIT (OUTPATIENT)
Dept: FAMILY MEDICINE CLINIC | Age: 74
End: 2024-01-10
Payer: MEDICARE

## 2024-01-10 VITALS
DIASTOLIC BLOOD PRESSURE: 70 MMHG | WEIGHT: 151.2 LBS | HEIGHT: 64 IN | OXYGEN SATURATION: 100 % | SYSTOLIC BLOOD PRESSURE: 136 MMHG | RESPIRATION RATE: 12 BRPM | BODY MASS INDEX: 25.81 KG/M2 | HEART RATE: 64 BPM

## 2024-01-10 DIAGNOSIS — I10 BENIGN ESSENTIAL HTN: ICD-10-CM

## 2024-01-10 DIAGNOSIS — G89.29 CHRONIC RIGHT SHOULDER PAIN: ICD-10-CM

## 2024-01-10 DIAGNOSIS — M25.511 CHRONIC RIGHT SHOULDER PAIN: ICD-10-CM

## 2024-01-10 DIAGNOSIS — N18.30 STAGE 3 CHRONIC KIDNEY DISEASE, UNSPECIFIED WHETHER STAGE 3A OR 3B CKD (HCC): ICD-10-CM

## 2024-01-10 DIAGNOSIS — F41.1 ANXIETY STATE: ICD-10-CM

## 2024-01-10 DIAGNOSIS — I10 BENIGN ESSENTIAL HTN: Primary | ICD-10-CM

## 2024-01-10 DIAGNOSIS — M62.838 TRAPEZIUS MUSCLE SPASM: ICD-10-CM

## 2024-01-10 DIAGNOSIS — M47.896 OTHER OSTEOARTHRITIS OF SPINE, LUMBAR REGION: ICD-10-CM

## 2024-01-10 DIAGNOSIS — I20.9 ANGINA PECTORIS, UNSPECIFIED (HCC): ICD-10-CM

## 2024-01-10 DIAGNOSIS — K21.9 GASTROESOPHAGEAL REFLUX DISEASE WITHOUT ESOPHAGITIS: ICD-10-CM

## 2024-01-10 LAB
ANION GAP SERPL CALCULATED.3IONS-SCNC: 14 MMOL/L (ref 3–16)
BUN SERPL-MCNC: 18 MG/DL (ref 7–20)
CALCIUM SERPL-MCNC: 9.9 MG/DL (ref 8.3–10.6)
CHLORIDE SERPL-SCNC: 103 MMOL/L (ref 99–110)
CO2 SERPL-SCNC: 25 MMOL/L (ref 21–32)
CREAT SERPL-MCNC: 1.1 MG/DL (ref 0.6–1.2)
GFR SERPLBLD CREATININE-BSD FMLA CKD-EPI: 53 ML/MIN/{1.73_M2}
GLUCOSE SERPL-MCNC: 88 MG/DL (ref 70–99)
POTASSIUM SERPL-SCNC: 4.5 MMOL/L (ref 3.5–5.1)
SODIUM SERPL-SCNC: 142 MMOL/L (ref 136–145)

## 2024-01-10 PROCEDURE — 1090F PRES/ABSN URINE INCON ASSESS: CPT | Performed by: FAMILY MEDICINE

## 2024-01-10 PROCEDURE — 3075F SYST BP GE 130 - 139MM HG: CPT | Performed by: FAMILY MEDICINE

## 2024-01-10 PROCEDURE — G8484 FLU IMMUNIZE NO ADMIN: HCPCS | Performed by: FAMILY MEDICINE

## 2024-01-10 PROCEDURE — 1123F ACP DISCUSS/DSCN MKR DOCD: CPT | Performed by: FAMILY MEDICINE

## 2024-01-10 PROCEDURE — 3078F DIAST BP <80 MM HG: CPT | Performed by: FAMILY MEDICINE

## 2024-01-10 PROCEDURE — G8427 DOCREV CUR MEDS BY ELIG CLIN: HCPCS | Performed by: FAMILY MEDICINE

## 2024-01-10 PROCEDURE — G8400 PT W/DXA NO RESULTS DOC: HCPCS | Performed by: FAMILY MEDICINE

## 2024-01-10 PROCEDURE — G8417 CALC BMI ABV UP PARAM F/U: HCPCS | Performed by: FAMILY MEDICINE

## 2024-01-10 PROCEDURE — 3017F COLORECTAL CA SCREEN DOC REV: CPT | Performed by: FAMILY MEDICINE

## 2024-01-10 PROCEDURE — 1036F TOBACCO NON-USER: CPT | Performed by: FAMILY MEDICINE

## 2024-01-10 PROCEDURE — 99214 OFFICE O/P EST MOD 30 MIN: CPT | Performed by: FAMILY MEDICINE

## 2024-01-10 RX ORDER — ATORVASTATIN CALCIUM 20 MG/1
TABLET, FILM COATED ORAL
Qty: 90 TABLET | Refills: 1 | Status: SHIPPED | OUTPATIENT
Start: 2024-01-10

## 2024-01-10 RX ORDER — TIZANIDINE 2 MG/1
2-4 TABLET ORAL 2 TIMES DAILY PRN
Qty: 40 TABLET | Refills: 0 | Status: SHIPPED | OUTPATIENT
Start: 2024-01-10

## 2024-01-10 RX ORDER — TRIAMTERENE AND HYDROCHLOROTHIAZIDE 37.5; 25 MG/1; MG/1
TABLET ORAL
Qty: 45 TABLET | Refills: 1 | Status: SHIPPED | OUTPATIENT
Start: 2024-01-10

## 2024-01-10 ASSESSMENT — PATIENT HEALTH QUESTIONNAIRE - PHQ9
SUM OF ALL RESPONSES TO PHQ QUESTIONS 1-9: 0
SUM OF ALL RESPONSES TO PHQ9 QUESTIONS 1 & 2: 0
2. FEELING DOWN, DEPRESSED OR HOPELESS: 0
SUM OF ALL RESPONSES TO PHQ QUESTIONS 1-9: 0
SUM OF ALL RESPONSES TO PHQ QUESTIONS 1-9: 0
1. LITTLE INTEREST OR PLEASURE IN DOING THINGS: 0
SUM OF ALL RESPONSES TO PHQ QUESTIONS 1-9: 0

## 2024-01-10 ASSESSMENT — ENCOUNTER SYMPTOMS
EYE PAIN: 0
TROUBLE SWALLOWING: 0
CHEST TIGHTNESS: 0
SHORTNESS OF BREATH: 0
DIARRHEA: 0
NAUSEA: 0
VOMITING: 0
WHEEZING: 0
ABDOMINAL PAIN: 0
BLOOD IN STOOL: 0

## 2024-01-10 NOTE — PROGRESS NOTES
Psychiatric/Behavioral:  Negative for agitation, confusion and hallucinations. The patient is nervous/anxious.         Anxiety has been stable.       PAST MEDICAL HISTORY  Past Medical History:   Diagnosis Date    Anxiety state     Arthritis     Benign essential HTN     Breast lump     Bunion of great toe of right foot     CKD (chronic kidney disease), stage II     Colon polyp 10-    Family history of early CAD     father had CABG in his 50s and 60s, brother had stroke and MI in his 70s. son had MI in 40s other son stroke at young age    GERD (gastroesophageal reflux disease)     History of nuclear stress test 07/07/2020    EF 71%. Normal study.    Hyperlipidemia     Internal hemorrhoids 10-    Irritable bowel syndrome     Nausea & vomiting     Osteoarthritis     Rosacea        FAMILY HISTORY  Family History   Problem Relation Age of Onset    Atrial Fibrillation Mother     Arthritis Mother     High Blood Pressure Father     Cancer Father         skin    Heart Disease Father     High Blood Pressure Brother     Stroke Brother     Parkinson's Disease Brother     High Blood Pressure Brother     Kidney Disease Brother     Other Son 25        CROHNS       SOCIAL HISTORY  Social History     Socioeconomic History    Marital status:    Tobacco Use    Smoking status: Never    Smokeless tobacco: Never   Vaping Use    Vaping Use: Never used   Substance and Sexual Activity    Alcohol use: Never    Drug use: No    Sexual activity: Yes     Partners: Male     Social Determinants of Health     Financial Resource Strain: Low Risk  (7/6/2022)    Overall Financial Resource Strain (CARDIA)     Difficulty of Paying Living Expenses: Not hard at all   Physical Activity: Insufficiently Active (7/13/2023)    Exercise Vital Sign     Days of Exercise per Week: 2 days     Minutes of Exercise per Session: 30 min        SURGICAL HISTORY  Past Surgical History:   Procedure Laterality Date    APPENDECTOMY  1963    BREAST

## 2024-01-12 ENCOUNTER — HOSPITAL ENCOUNTER (OUTPATIENT)
Dept: PHYSICAL THERAPY | Age: 74
Setting detail: THERAPIES SERIES
Discharge: HOME | End: 2024-01-12
Payer: MEDICARE

## 2024-01-12 PROCEDURE — 97530 THERAPEUTIC ACTIVITIES: CPT

## 2024-01-12 NOTE — DISCHARGE SUMMARY
Outpatient Physical Therapy           [place]           [x] Phone: [phone]   Fax: [phone]  [place]           [] Phone: [phone]   Fax: [phone]      To: Jake Dumont, *     From: Lata Dewitt, PT, DPT, Cert. DN      Patient: Madiha Martin                    : 1950  Diagnosis:  Neck pain [M54.2]        Treatment Diagnosis:   L cervical/UT strain, headaches    Date: 2024  []  Progress Note                [x]  Discharge Note    Evaluation Date: 23    Total Visits to date:   6 Cancels/No-shows to date:  0    Subjective:   pt reports that she is ready to be discharged from her neck pain referral, she is going to call Dr. Mary to ask about cortisone injection and being seen for her R shoulder. She notes that she doesn't have any pain in the neck and doesn't have any headaches anymore. She notes that she is going to the doctor to see about her cataracts with blurred vision since the fall. She feels that she has improved overall by 98-99% in the neck since starting therapy. She notes that sometimes she can still feel the soreness from where she hit her head on the recliner but it is not painful. She notes that she is able to read for 30 minutes without having any neck pain, can't read too long because the cataracts are causing the words to pop out on top of each other. NDI:        Objective/Significant Findings At Last Visit/Comments:    Cervical flexion AROM: 45 deg   Cervical R ROT AROM: 60 deg     Assessment:     Pt has shown good progress since therapy start regarding improved ROM, headahces, pain,a ctivity tolerance and functional mobility. Pt has met all of her  goals at this time and is no longer having any major limitations outside of therapy.  pt is having some R shoulder pain that has started going into her hand lately. Pt wanting an injection, PT informed pt to call ortho office for appt and possible referral to therapy for R shoulder. PT educated pt

## 2024-01-12 NOTE — FLOWSHEET NOTE
Estim  [] Gait Training      [] Cervical Traction [] Lumbar Traction  [x] Neuromuscular Re-education    [x] Cold/hotpack [] Iontophoresis   [x] Instruction in HEP      [] Vasopneumatic   [x] Dry Needling    [x] Manual Therapy               [] Aquatic Therapy              Electronically signed by: Lata Dewitt PT, DPT, Cert. DN    1/12/2024, 7:24 AM

## 2024-01-15 ENCOUNTER — OFFICE VISIT (OUTPATIENT)
Dept: ORTHOPEDIC SURGERY | Age: 74
End: 2024-01-15
Payer: MEDICARE

## 2024-01-15 VITALS — HEART RATE: 88 BPM | DIASTOLIC BLOOD PRESSURE: 82 MMHG | OXYGEN SATURATION: 98 % | SYSTOLIC BLOOD PRESSURE: 160 MMHG

## 2024-01-15 DIAGNOSIS — M75.21 BICEPS TENDINITIS OF RIGHT UPPER EXTREMITY: Primary | ICD-10-CM

## 2024-01-15 PROCEDURE — G8427 DOCREV CUR MEDS BY ELIG CLIN: HCPCS | Performed by: STUDENT IN AN ORGANIZED HEALTH CARE EDUCATION/TRAINING PROGRAM

## 2024-01-15 PROCEDURE — 3077F SYST BP >= 140 MM HG: CPT | Performed by: STUDENT IN AN ORGANIZED HEALTH CARE EDUCATION/TRAINING PROGRAM

## 2024-01-15 PROCEDURE — 3017F COLORECTAL CA SCREEN DOC REV: CPT | Performed by: STUDENT IN AN ORGANIZED HEALTH CARE EDUCATION/TRAINING PROGRAM

## 2024-01-15 PROCEDURE — 1036F TOBACCO NON-USER: CPT | Performed by: STUDENT IN AN ORGANIZED HEALTH CARE EDUCATION/TRAINING PROGRAM

## 2024-01-15 PROCEDURE — 3079F DIAST BP 80-89 MM HG: CPT | Performed by: STUDENT IN AN ORGANIZED HEALTH CARE EDUCATION/TRAINING PROGRAM

## 2024-01-15 PROCEDURE — 1123F ACP DISCUSS/DSCN MKR DOCD: CPT | Performed by: STUDENT IN AN ORGANIZED HEALTH CARE EDUCATION/TRAINING PROGRAM

## 2024-01-15 PROCEDURE — G8484 FLU IMMUNIZE NO ADMIN: HCPCS | Performed by: STUDENT IN AN ORGANIZED HEALTH CARE EDUCATION/TRAINING PROGRAM

## 2024-01-15 PROCEDURE — 99213 OFFICE O/P EST LOW 20 MIN: CPT | Performed by: STUDENT IN AN ORGANIZED HEALTH CARE EDUCATION/TRAINING PROGRAM

## 2024-01-15 PROCEDURE — 20610 DRAIN/INJ JOINT/BURSA W/O US: CPT | Performed by: STUDENT IN AN ORGANIZED HEALTH CARE EDUCATION/TRAINING PROGRAM

## 2024-01-15 PROCEDURE — G8417 CALC BMI ABV UP PARAM F/U: HCPCS | Performed by: STUDENT IN AN ORGANIZED HEALTH CARE EDUCATION/TRAINING PROGRAM

## 2024-01-15 PROCEDURE — G8400 PT W/DXA NO RESULTS DOC: HCPCS | Performed by: STUDENT IN AN ORGANIZED HEALTH CARE EDUCATION/TRAINING PROGRAM

## 2024-01-15 PROCEDURE — 1090F PRES/ABSN URINE INCON ASSESS: CPT | Performed by: STUDENT IN AN ORGANIZED HEALTH CARE EDUCATION/TRAINING PROGRAM

## 2024-01-15 RX ORDER — TRIAMCINOLONE ACETONIDE 40 MG/ML
40 INJECTION, SUSPENSION INTRA-ARTICULAR; INTRAMUSCULAR ONCE
Status: COMPLETED | OUTPATIENT
Start: 2024-01-15 | End: 2024-01-15

## 2024-01-15 RX ADMIN — TRIAMCINOLONE ACETONIDE 40 MG: 40 INJECTION, SUSPENSION INTRA-ARTICULAR; INTRAMUSCULAR at 12:21

## 2024-01-15 ASSESSMENT — ENCOUNTER SYMPTOMS
BACK PAIN: 0
FACIAL SWELLING: 0
SHORTNESS OF BREATH: 0
WHEEZING: 0
COUGH: 0
EYE REDNESS: 0
VOMITING: 0
PHOTOPHOBIA: 0
EYE PAIN: 0
COLOR CHANGE: 0
ABDOMINAL PAIN: 0
STRIDOR: 0
NAUSEA: 0

## 2024-01-15 NOTE — PROGRESS NOTES
1/15/2024   Chief Complaint   Patient presents with    Follow-up     Follow up on right shoulder injection from 8/14/23       Updated HPI: Patient returns for reevaluation of her right upper extremity as well as requesting right shoulder biceps tendon injection once again as she did well with the most recent injection in August.  She was seen in November and was not having symptoms at that time and therefore we declined doing an injection.  However, she has been working physical therapy for her cervical spine and states that they were doing increased activity of the right and left upper extremity and that her biceps became inflamed and she was requesting injection at this time.  No complications or issues with previous injections.  No changes to her health history since last being seen.  Pain remains isolated to the biceps.  No rotator cuff associated pain at this time.    Previous HPI (11/15/2023): Patient returns today for reevaluation of her right upper extremity.  She states that she has been feeling excellent in regards to the right upper extremity and the injection provided substantial relief.  She does not feel that she needs any additional injection today due to this.  She has been having some left-sided paracervical spinal musculature pain and she is set to be seen by her PCP for this issue but she is having no radicular type symptoms including any Numbness or tingling.    Previous HPI (8/14/2023): Patient returns today for reevaluation of her right upper extremity as well as discuss EMG and cervical spine imaging results.  No new injuries or complaint since last being seen.  No changes in her health history.  No changes in her symptoms.  Continues to with pain in the right upper extremity mainly at the biceps region.    Previous HPI (7/12/2023):                             Madiha Martin is a 73 y.o. female right handed patient referred by PCP for evaluation and treatment right shoulder pain.  Patient

## 2024-01-15 NOTE — PROGRESS NOTES
Wants new injection in right shoulder, last injection on 8/14/23. NOT on 11/15/23. Last injection effective until she started doing PT for cervical radiculopathy. Finished on 1/12/23.   Last injection 8/14/23    Right Bicipital Groove Injection Procedure Note   Pre-operative Diagnosis: Right biceps tendinitis   Post-operative Diagnosis: same   Indications: Symptomatic relief of tendinitis   Anesthesia: Lidocaine 1% and marcaine 0.5% without epinephrine without added sodium bicarbonate   Procedure Details   Verbal consent was obtained for the procedure. The shoulder was prepped with alcohol. A 22 gauge needle was advanced into the bicipital groove area through anterior approach without difficulty. The needle was slightly retracted to inject into the bicep sheath. The space was then injected with 1 ml 1% lidocaine and 1 ml 0.5% marcaine and 1 ml of triamcinolone (KENALOG) 40mg/ml. The injection site was cleansed with isopropyl alcohol and a dressing was applied.   Complications: None; patient tolerated the procedure well. Symptoms were improved immediately after the injection.

## 2024-01-24 ENCOUNTER — TELEPHONE (OUTPATIENT)
Dept: ORTHOPEDIC SURGERY | Age: 74
End: 2024-01-24

## 2024-01-24 DIAGNOSIS — M75.21 BICEPS TENDINITIS OF RIGHT UPPER EXTREMITY: Primary | ICD-10-CM

## 2024-01-24 RX ORDER — METHYLPREDNISOLONE 4 MG/1
TABLET ORAL
Qty: 1 KIT | Refills: 0 | Status: SHIPPED | OUTPATIENT
Start: 2024-01-24 | End: 2024-01-30

## 2024-01-24 NOTE — TELEPHONE ENCOUNTER
I talked with the patient and she would like the Medrol dosepak. She states that she already has compound cream and will use that.

## 2024-01-24 NOTE — TELEPHONE ENCOUNTER
I will provide a prescription for Medrol Dosepak to see if this provides relief instead.  We can also provide the patient with a compound cream prescription if she would like to try this.  Please let our staff know if we need to provide the prescription for compound cream

## 2024-01-24 NOTE — TELEPHONE ENCOUNTER
Patient called and stated that she had an injection in her right shoulder on 1-15. She stated that she is now having more pain than before. The pain is now radiating to her wrist, and causing limited mobility. She is \"unable to even brush her hair).

## 2024-01-30 LAB — MAMMOGRAPHY, EXTERNAL: NORMAL

## 2024-02-06 ENCOUNTER — OFFICE VISIT (OUTPATIENT)
Dept: CARDIOLOGY CLINIC | Age: 74
End: 2024-02-06
Payer: MEDICARE

## 2024-02-06 VITALS
WEIGHT: 149.8 LBS | BODY MASS INDEX: 25.57 KG/M2 | DIASTOLIC BLOOD PRESSURE: 80 MMHG | SYSTOLIC BLOOD PRESSURE: 142 MMHG | HEART RATE: 64 BPM | HEIGHT: 64 IN

## 2024-02-06 DIAGNOSIS — Z82.49 FAMILY HISTORY OF EARLY CAD: ICD-10-CM

## 2024-02-06 DIAGNOSIS — E78.00 PURE HYPERCHOLESTEROLEMIA: ICD-10-CM

## 2024-02-06 DIAGNOSIS — I10 BENIGN ESSENTIAL HTN: Primary | ICD-10-CM

## 2024-02-06 PROBLEM — N18.30 CHRONIC RENAL DISEASE, STAGE III (HCC): Status: RESOLVED | Noted: 2022-10-10 | Resolved: 2024-02-06

## 2024-02-06 PROCEDURE — 3017F COLORECTAL CA SCREEN DOC REV: CPT | Performed by: INTERNAL MEDICINE

## 2024-02-06 PROCEDURE — 93000 ELECTROCARDIOGRAM COMPLETE: CPT | Performed by: INTERNAL MEDICINE

## 2024-02-06 PROCEDURE — 3079F DIAST BP 80-89 MM HG: CPT | Performed by: INTERNAL MEDICINE

## 2024-02-06 PROCEDURE — 1090F PRES/ABSN URINE INCON ASSESS: CPT | Performed by: INTERNAL MEDICINE

## 2024-02-06 PROCEDURE — G8427 DOCREV CUR MEDS BY ELIG CLIN: HCPCS | Performed by: INTERNAL MEDICINE

## 2024-02-06 PROCEDURE — 1123F ACP DISCUSS/DSCN MKR DOCD: CPT | Performed by: INTERNAL MEDICINE

## 2024-02-06 PROCEDURE — G8417 CALC BMI ABV UP PARAM F/U: HCPCS | Performed by: INTERNAL MEDICINE

## 2024-02-06 PROCEDURE — G8484 FLU IMMUNIZE NO ADMIN: HCPCS | Performed by: INTERNAL MEDICINE

## 2024-02-06 PROCEDURE — 1036F TOBACCO NON-USER: CPT | Performed by: INTERNAL MEDICINE

## 2024-02-06 PROCEDURE — G8400 PT W/DXA NO RESULTS DOC: HCPCS | Performed by: INTERNAL MEDICINE

## 2024-02-06 PROCEDURE — 99214 OFFICE O/P EST MOD 30 MIN: CPT | Performed by: INTERNAL MEDICINE

## 2024-02-06 PROCEDURE — 3077F SYST BP >= 140 MM HG: CPT | Performed by: INTERNAL MEDICINE

## 2024-02-06 RX ORDER — ISOSORBIDE MONONITRATE 30 MG/1
30 TABLET, EXTENDED RELEASE ORAL DAILY
Qty: 90 TABLET | Refills: 3 | Status: SHIPPED | OUTPATIENT
Start: 2024-02-06

## 2024-02-06 RX ORDER — NITROGLYCERIN 0.4 MG/1
0.4 TABLET SUBLINGUAL EVERY 5 MIN PRN
Qty: 25 TABLET | Refills: 3 | Status: SHIPPED | OUTPATIENT
Start: 2024-02-06

## 2024-02-06 NOTE — PROGRESS NOTES
Madiha Martin  1950  Jake Dumont MD      Chief Complaint   Patient presents with    Hyperlipidemia    Hypertension    Follow-up     No chest pain, SOB dizziness, or palpitations  Complains of slight swelling in both legs      Chief complaint and HPI:  Madiha Martin  is a 73 y.o. female following up for hypertension and hyperlipidemia and family history of premature coronary artery disease.  She denies any chest pains or shortness of breath.  She stays active more active during the summertime with flower beds and yard work done during wintertime.  She does not have any indoor exercise like activities.  She is compliant to her medications.  She reports some swelling in the legs goes away when she is resting.  Had bilateral vein therapies by Dr. Paxton Howard and it did help with her swelling.    Rest of the Cardiovascular system review is otherwise unchanged from prior encounter.  Past medical history:  has a past medical history of Anxiety state, Arthritis, Benign essential HTN, Breast lump, Bunion of great toe of right foot, CKD (chronic kidney disease), stage II, Colon polyp, Family history of early CAD, GERD (gastroesophageal reflux disease), History of nuclear stress test, Hyperlipidemia, Internal hemorrhoids, Irritable bowel syndrome, Nausea & vomiting, Osteoarthritis, and Rosacea.  Past surgical history:  has a past surgical history that includes  section (); Tubal ligation (); Appendectomy (); Breast surgery (); Cholecystectomy (); Cardiac catheterization (); tumor removal; Foot Tendon Surgery; Bunionectomy (Left); Foot surgery; and Colonoscopy (2018).  Social History:   Social History     Tobacco Use    Smoking status: Never    Smokeless tobacco: Never   Substance Use Topics    Alcohol use: Never     Family history: family history includes Arthritis in her mother; Atrial Fibrillation in her mother; Cancer in her father; Heart Disease in her father; High

## 2024-02-06 NOTE — PATIENT INSTRUCTIONS
Continue current cardiovascular medications which have been reviewed and discussed individually with you. Start monitoring BP and HR daily and write it down and bring the readings along with the BP monitor for office visit.  Primary/secondary prevention is the goal by aggressive risk modification, healthy and therapeutic life style changes for cardiovascular risk reduction. Various goals are discussed and questions answered.  Appropriate prescriptions if needed on this visit are addressed. After visit summery is provided.   Questions answered and patient verbalizes understanding. Follow up in 12 months with ECG,  sooner if needed

## 2024-02-12 ENCOUNTER — OFFICE VISIT (OUTPATIENT)
Dept: ORTHOPEDIC SURGERY | Age: 74
End: 2024-02-12
Payer: MEDICARE

## 2024-02-12 VITALS — WEIGHT: 149.8 LBS | HEIGHT: 64 IN | BODY MASS INDEX: 25.57 KG/M2

## 2024-02-12 DIAGNOSIS — M67.911 TENDINOPATHY OF RIGHT ROTATOR CUFF: Primary | ICD-10-CM

## 2024-02-12 PROCEDURE — G8427 DOCREV CUR MEDS BY ELIG CLIN: HCPCS | Performed by: STUDENT IN AN ORGANIZED HEALTH CARE EDUCATION/TRAINING PROGRAM

## 2024-02-12 PROCEDURE — 1090F PRES/ABSN URINE INCON ASSESS: CPT | Performed by: STUDENT IN AN ORGANIZED HEALTH CARE EDUCATION/TRAINING PROGRAM

## 2024-02-12 PROCEDURE — 99213 OFFICE O/P EST LOW 20 MIN: CPT | Performed by: STUDENT IN AN ORGANIZED HEALTH CARE EDUCATION/TRAINING PROGRAM

## 2024-02-12 PROCEDURE — 1123F ACP DISCUSS/DSCN MKR DOCD: CPT | Performed by: STUDENT IN AN ORGANIZED HEALTH CARE EDUCATION/TRAINING PROGRAM

## 2024-02-12 PROCEDURE — G8400 PT W/DXA NO RESULTS DOC: HCPCS | Performed by: STUDENT IN AN ORGANIZED HEALTH CARE EDUCATION/TRAINING PROGRAM

## 2024-02-12 PROCEDURE — 3017F COLORECTAL CA SCREEN DOC REV: CPT | Performed by: STUDENT IN AN ORGANIZED HEALTH CARE EDUCATION/TRAINING PROGRAM

## 2024-02-12 PROCEDURE — 1036F TOBACCO NON-USER: CPT | Performed by: STUDENT IN AN ORGANIZED HEALTH CARE EDUCATION/TRAINING PROGRAM

## 2024-02-12 PROCEDURE — G8417 CALC BMI ABV UP PARAM F/U: HCPCS | Performed by: STUDENT IN AN ORGANIZED HEALTH CARE EDUCATION/TRAINING PROGRAM

## 2024-02-12 PROCEDURE — G8484 FLU IMMUNIZE NO ADMIN: HCPCS | Performed by: STUDENT IN AN ORGANIZED HEALTH CARE EDUCATION/TRAINING PROGRAM

## 2024-02-12 RX ORDER — TRAMADOL HYDROCHLORIDE 50 MG/1
50 TABLET ORAL EVERY 4 HOURS PRN
Qty: 18 TABLET | Refills: 0 | Status: SHIPPED | OUTPATIENT
Start: 2024-02-12 | End: 2024-02-15

## 2024-02-12 NOTE — PROGRESS NOTES
Pt comes in today for a follow up of a bicipital groove injection on 1/15/24. She states that this injection helped a little bit. She called on 1/24/24 stating that she was still having increased pain. She was prescribed a medrol dose meng at that time. She states that the medication didn't really help. She reports pain at night. She is unable to curl her hair or do baking. She denies any new falls or injures. Her pain is located more globally in her RT arm. She states that he pain radiated into her entire hand. She rates her pain at 3-4/10 currently. She describes the pain as achy. She reports sharp pain with activity.

## 2024-02-12 NOTE — PROGRESS NOTES
2/12/2024   Chief Complaint   Patient presents with    Pain     Right upper extremity       Updated HPI: Patient returns today for reevaluation of her right upper extremity.  She states that since seeing me she has been having significant increased pain at the rotator cuff insertion of the right shoulder.  She had minimal relief from the Medrol Dosepak which was provided per her request.  No new injuries or complaints.  Difficulty using the shoulder due to the lateral shoulder pain.  No specific injury to the shoulder.  Has been having difficulty performing ADLs and overhead activities because the shoulder pain    Previous HPI (1/15/2024): Patient returns for reevaluation of her right upper extremity as well as requesting right shoulder biceps tendon injection once again as she did well with the most recent injection in August.  She was seen in November and was not having symptoms at that time and therefore we declined doing an injection.  However, she has been working physical therapy for her cervical spine and states that they were doing increased activity of the right and left upper extremity and that her biceps became inflamed and she was requesting injection at this time.  No complications or issues with previous injections.  No changes to her health history since last being seen.  Pain remains isolated to the biceps.  No rotator cuff associated pain at this time.    Previous HPI (11/15/2023): Patient returns today for reevaluation of her right upper extremity.  She states that she has been feeling excellent in regards to the right upper extremity and the injection provided substantial relief.  She does not feel that she needs any additional injection today due to this.  She has been having some left-sided paracervical spinal musculature pain and she is set to be seen by her PCP for this issue but she is having no radicular type symptoms including any Numbness or tingling.    Previous HPI (8/14/2023):

## 2024-02-12 NOTE — PATIENT INSTRUCTIONS
Medication sent to your pharmacy.  Right shoulder MRI ordered. Please call Central Scheduling [phone].  Follow up in our office for results.

## 2024-02-24 ENCOUNTER — HOSPITAL ENCOUNTER (OUTPATIENT)
Dept: MRI IMAGING | Age: 74
Discharge: HOME | End: 2024-02-24
Attending: STUDENT IN AN ORGANIZED HEALTH CARE EDUCATION/TRAINING PROGRAM
Payer: MEDICARE

## 2024-02-24 DIAGNOSIS — M67.911 TENDINOPATHY OF RIGHT ROTATOR CUFF: ICD-10-CM

## 2024-02-24 PROCEDURE — 73221 MRI JOINT UPR EXTREM W/O DYE: CPT

## 2024-03-06 ENCOUNTER — OFFICE VISIT (OUTPATIENT)
Dept: ORTHOPEDIC SURGERY | Age: 74
End: 2024-03-06
Payer: MEDICARE

## 2024-03-06 VITALS — SYSTOLIC BLOOD PRESSURE: 180 MMHG | DIASTOLIC BLOOD PRESSURE: 98 MMHG | OXYGEN SATURATION: 98 % | HEART RATE: 80 BPM

## 2024-03-06 DIAGNOSIS — M75.121 COMPLETE TEAR OF RIGHT ROTATOR CUFF, UNSPECIFIED WHETHER TRAUMATIC: Primary | ICD-10-CM

## 2024-03-06 PROCEDURE — 3017F COLORECTAL CA SCREEN DOC REV: CPT | Performed by: STUDENT IN AN ORGANIZED HEALTH CARE EDUCATION/TRAINING PROGRAM

## 2024-03-06 PROCEDURE — 1090F PRES/ABSN URINE INCON ASSESS: CPT | Performed by: STUDENT IN AN ORGANIZED HEALTH CARE EDUCATION/TRAINING PROGRAM

## 2024-03-06 PROCEDURE — G8417 CALC BMI ABV UP PARAM F/U: HCPCS | Performed by: STUDENT IN AN ORGANIZED HEALTH CARE EDUCATION/TRAINING PROGRAM

## 2024-03-06 PROCEDURE — G8400 PT W/DXA NO RESULTS DOC: HCPCS | Performed by: STUDENT IN AN ORGANIZED HEALTH CARE EDUCATION/TRAINING PROGRAM

## 2024-03-06 PROCEDURE — 3077F SYST BP >= 140 MM HG: CPT | Performed by: STUDENT IN AN ORGANIZED HEALTH CARE EDUCATION/TRAINING PROGRAM

## 2024-03-06 PROCEDURE — G8427 DOCREV CUR MEDS BY ELIG CLIN: HCPCS | Performed by: STUDENT IN AN ORGANIZED HEALTH CARE EDUCATION/TRAINING PROGRAM

## 2024-03-06 PROCEDURE — 99214 OFFICE O/P EST MOD 30 MIN: CPT | Performed by: STUDENT IN AN ORGANIZED HEALTH CARE EDUCATION/TRAINING PROGRAM

## 2024-03-06 PROCEDURE — G8484 FLU IMMUNIZE NO ADMIN: HCPCS | Performed by: STUDENT IN AN ORGANIZED HEALTH CARE EDUCATION/TRAINING PROGRAM

## 2024-03-06 PROCEDURE — 3080F DIAST BP >= 90 MM HG: CPT | Performed by: STUDENT IN AN ORGANIZED HEALTH CARE EDUCATION/TRAINING PROGRAM

## 2024-03-06 PROCEDURE — 1123F ACP DISCUSS/DSCN MKR DOCD: CPT | Performed by: STUDENT IN AN ORGANIZED HEALTH CARE EDUCATION/TRAINING PROGRAM

## 2024-03-06 PROCEDURE — 1036F TOBACCO NON-USER: CPT | Performed by: STUDENT IN AN ORGANIZED HEALTH CARE EDUCATION/TRAINING PROGRAM

## 2024-03-06 ASSESSMENT — ENCOUNTER SYMPTOMS
STRIDOR: 0
COLOR CHANGE: 0
BACK PAIN: 0
EYE PAIN: 0
EYE REDNESS: 0
COUGH: 0
NAUSEA: 0
ABDOMINAL PAIN: 0
PHOTOPHOBIA: 0
FACIAL SWELLING: 0
WHEEZING: 0
SHORTNESS OF BREATH: 0
VOMITING: 0

## 2024-03-06 NOTE — PATIENT INSTRUCTIONS
We will schedule surgery at soonest convenience. If you have any questions regarding your surgery please call our office and ask to speak with Jenn [phone]

## 2024-03-06 NOTE — PROGRESS NOTES
Follow up on right shoulder pain, rated 7/10 today. Denies numbness, tingling or new injury.     Review MRI, impression as follows    IMPRESSION:  Large full-thickness tear of the distal subscapularis tendon with associated  anterior glenohumeral subluxation.  The tearing may be subacute.  Moderate  retraction and no advanced atrophy.     Large full-thickness tear of the supraspinatus tendon with significant  retraction and moderate fatty atrophy, likely chronic.     Mild glenohumeral and acromioclavicular osteoarthritis.     Medial dislocation of the biceps tendon.  Question of high-grade tearing of  the intra-articular biceps tendon.  
ago.  She is also been told that she has osteoarthritis of her neck.    The pain occurs every day and when active. Location of pain is diffusely and in a radicular type pattern in the right upper extremity. No history of dislocation. Symptoms are aggravated by ADL's, repetitive use. Symptoms are diminished by rest, placing the arm in a certain position, avoiding the painful activities.     Limited activities include: lifting, repetitive use,  difficulty sleeping, difficulty with ADLs. No stiffness is reported.     Patient admits to numbness, tingling, altered sensation in the extremity.    Related history: negative for prior surgery, trauma, arthritis or disorders.     Is affecting ADLs.  Pain is 8/10 at it's worst.    Outside reports reviewed: Previous imaging reviewed.     Patient's medications, allergies, past medical, surgical, social and family histories were reviewed and updated as appropriate.     Patient has not previously attempted CSI, PT, NSAIDs for pain relief     Medical History  Patient's medications, allergies, past medical, surgical, social and family histories were reviewed and updated as appropriate.    Past Medical History:   Diagnosis Date    Anxiety state     Arthritis     Benign essential HTN     Breast lump     Bunion of great toe of right foot     CKD (chronic kidney disease), stage II     Colon polyp 10-    Family history of early CAD     father had CABG in his 50s and 60s, brother had stroke and MI in his 70s. son had MI in 40s other son stroke at young age    GERD (gastroesophageal reflux disease)     History of nuclear stress test 2020    EF 71%. Normal study.    Hyperlipidemia     Internal hemorrhoids 10-    Irritable bowel syndrome     Nausea & vomiting     Osteoarthritis     Rosacea      Past Surgical History:   Procedure Laterality Date    APPENDECTOMY  1963    BREAST SURGERY  2002    BIOPSY    BUNIONECTOMY Left     CARDIAC CATHETERIZATION       SECTION

## 2024-03-07 ENCOUNTER — TELEPHONE (OUTPATIENT)
Dept: ORTHOPEDIC SURGERY | Age: 74
End: 2024-03-07

## 2024-03-07 DIAGNOSIS — M25.511 CHRONIC PAIN IN RIGHT SHOULDER: ICD-10-CM

## 2024-03-07 DIAGNOSIS — M75.121 NONTRAUMATIC COMPLETE TEAR OF RIGHT ROTATOR CUFF: Primary | ICD-10-CM

## 2024-03-07 DIAGNOSIS — G89.29 CHRONIC PAIN IN RIGHT SHOULDER: ICD-10-CM

## 2024-03-07 NOTE — TELEPHONE ENCOUNTER
Scheduled patient for:    Right Reverse Total Shoulder Arthroplasty with Biceps Tenodesis  CPT: 06335; 36820  ICD 10: M75.121; M25.511  Surgery Date:3/26/2024  Surgeon: Usman  Facility: [facility]  Product: [employer]  Anesthesia: General/Nerve Block    Clearances sent to:  PCP: Julia  Cardiac: DAVIDE Looney    Physical Therapy: [facility], order created  CT of Right Shoulder for [facility] Protocol, order created    Insurance: [employer]  Prior auth started on 3/26/2024 via [facility] protal  APPROVED  #[id number]  Date Span: 3/26/202-6/1/2024

## 2024-03-08 ENCOUNTER — TELEPHONE (OUTPATIENT)
Dept: CARDIOLOGY CLINIC | Age: 74
End: 2024-03-08

## 2024-03-08 NOTE — TELEPHONE ENCOUNTER
Cardiologist: Dr. Whitehead  Surgeon: Dr. Mary   Surgery: Rt. Total shoulder   Anesthesia: General, Nerve Block  Date: 3/26/24  FAX# [phone]    Ph# [phone]    Last OV 2/6/24 Ramone

## 2024-03-11 ENCOUNTER — NURSE ONLY (OUTPATIENT)
Dept: ORTHOPEDIC SURGERY | Age: 74
End: 2024-03-11

## 2024-03-11 ENCOUNTER — OFFICE VISIT (OUTPATIENT)
Dept: FAMILY MEDICINE CLINIC | Age: 74
End: 2024-03-11
Payer: MEDICARE

## 2024-03-11 VITALS
DIASTOLIC BLOOD PRESSURE: 74 MMHG | HEIGHT: 64 IN | BODY MASS INDEX: 25.78 KG/M2 | HEART RATE: 69 BPM | SYSTOLIC BLOOD PRESSURE: 138 MMHG | WEIGHT: 151 LBS | OXYGEN SATURATION: 99 %

## 2024-03-11 DIAGNOSIS — M75.121 COMPLETE TEAR OF RIGHT ROTATOR CUFF, UNSPECIFIED WHETHER TRAUMATIC: ICD-10-CM

## 2024-03-11 DIAGNOSIS — I10 BENIGN ESSENTIAL HTN: ICD-10-CM

## 2024-03-11 DIAGNOSIS — Z01.818 ENCOUNTER FOR PREOPERATIVE ASSESSMENT: Primary | ICD-10-CM

## 2024-03-11 PROCEDURE — 3017F COLORECTAL CA SCREEN DOC REV: CPT | Performed by: INTERNAL MEDICINE

## 2024-03-11 PROCEDURE — G8427 DOCREV CUR MEDS BY ELIG CLIN: HCPCS | Performed by: INTERNAL MEDICINE

## 2024-03-11 PROCEDURE — G8400 PT W/DXA NO RESULTS DOC: HCPCS | Performed by: INTERNAL MEDICINE

## 2024-03-11 PROCEDURE — 1090F PRES/ABSN URINE INCON ASSESS: CPT | Performed by: INTERNAL MEDICINE

## 2024-03-11 PROCEDURE — 99214 OFFICE O/P EST MOD 30 MIN: CPT | Performed by: INTERNAL MEDICINE

## 2024-03-11 PROCEDURE — G8484 FLU IMMUNIZE NO ADMIN: HCPCS | Performed by: INTERNAL MEDICINE

## 2024-03-11 PROCEDURE — G8417 CALC BMI ABV UP PARAM F/U: HCPCS | Performed by: INTERNAL MEDICINE

## 2024-03-11 PROCEDURE — 3075F SYST BP GE 130 - 139MM HG: CPT | Performed by: INTERNAL MEDICINE

## 2024-03-11 PROCEDURE — 1123F ACP DISCUSS/DSCN MKR DOCD: CPT | Performed by: INTERNAL MEDICINE

## 2024-03-11 PROCEDURE — 3078F DIAST BP <80 MM HG: CPT | Performed by: INTERNAL MEDICINE

## 2024-03-11 PROCEDURE — 1036F TOBACCO NON-USER: CPT | Performed by: INTERNAL MEDICINE

## 2024-03-11 NOTE — PROGRESS NOTES
Joint Replacement Assessment, Right Rev TSA, DOS 3/26/2024    Patient will discuss PT at first post op appt.     All questions and concerns of the patient's have been answered at this time to the best of my ability. Patient is aware he may contact this nurse at her direct number given to him at anytime with questions or concerns moving forward.

## 2024-03-11 NOTE — PROGRESS NOTES
Cardiovascular: Normal rate, regular rhythm, normal heart sounds and intact distal pulses.  Short early ANGIE over tricuspid valve  Pulmonary/Chest: Effort normal and breath sounds normal.   Abdominal: Soft. Bowel sounds are normal. He exhibits no distension. There is no tenderness.   Musculoskeletal: Normal range of motion.   Neurological: She is alert and oriented to person, place, and time.   Skin: Skin is warm and dry. Capillary refill takes less than 2 seconds.   Psychiatric: She has a normal mood and affect.  Her behavior is normal. Judgment and thought content normal.     Assessment & Plan     Diagnosis Orders   1. Encounter for preoperative assessment        2. Benign essential HTN        3. Complete tear of right rotator cuff, unspecified whether traumatic            Surgery has ordered pre-op labs and cxr.  She is cleared for suregery.    Return if symptoms worsen or fail to improve.     Virgil See MD, 3/11/2024 3:08 PM

## 2024-03-11 NOTE — PROGRESS NOTES
3/11/24 - .Reminded of pre-testing on 3/13/24 @0915.   Bring insurance card, picture ID and a list of your home medications. Check in at the information desk in the lobby upon your arrival. You can eat and take morning medications. Patient verbalized understanding.

## 2024-03-13 ENCOUNTER — HOSPITAL ENCOUNTER (OUTPATIENT)
Dept: GENERAL RADIOLOGY | Age: 74
Discharge: HOME | End: 2024-03-13
Payer: MEDICARE

## 2024-03-13 ENCOUNTER — HOSPITAL ENCOUNTER (OUTPATIENT)
Dept: PREADMISSION TESTING | Age: 74
Discharge: HOME | End: 2024-03-13
Payer: MEDICARE

## 2024-03-13 ENCOUNTER — HOSPITAL ENCOUNTER (OUTPATIENT)
Age: 74
Discharge: HOME | End: 2024-03-13
Payer: MEDICARE

## 2024-03-13 VITALS
WEIGHT: 153 LBS | RESPIRATION RATE: 20 BRPM | BODY MASS INDEX: 26.12 KG/M2 | HEIGHT: 64 IN | SYSTOLIC BLOOD PRESSURE: 137 MMHG | DIASTOLIC BLOOD PRESSURE: 63 MMHG | HEART RATE: 78 BPM | TEMPERATURE: 97.2 F

## 2024-03-13 DIAGNOSIS — Z01.818 PRE-OP TESTING: ICD-10-CM

## 2024-03-13 DIAGNOSIS — Z01.818 PRE-OP TESTING: Primary | ICD-10-CM

## 2024-03-13 LAB
ALBUMIN SERPL-MCNC: 4.3 GM/DL (ref 3.4–5)
ALP BLD-CCNC: 93 IU/L (ref 40–128)
ALT SERPL-CCNC: 13 U/L (ref 10–40)
ANION GAP SERPL CALCULATED.3IONS-SCNC: 12 MMOL/L (ref 7–16)
AST SERPL-CCNC: 21 IU/L (ref 15–37)
BASOPHILS ABSOLUTE: 0.1 K/CU MM
BASOPHILS RELATIVE PERCENT: 0.8 % (ref 0–1)
BILIRUB SERPL-MCNC: 0.9 MG/DL (ref 0–1)
BILIRUBIN URINE: NEGATIVE MG/DL
BLOOD, URINE: NEGATIVE
BUN SERPL-MCNC: 19 MG/DL (ref 6–23)
CALCIUM SERPL-MCNC: 10.2 MG/DL (ref 8.3–10.6)
CHLORIDE BLD-SCNC: 102 MMOL/L (ref 99–110)
CLARITY: CLEAR
CO2: 28 MMOL/L (ref 21–32)
COLOR: YELLOW
COMMENT UA: NORMAL
CREAT SERPL-MCNC: 1.1 MG/DL (ref 0.6–1.1)
DIFFERENTIAL TYPE: ABNORMAL
EOSINOPHILS ABSOLUTE: 0.1 K/CU MM
EOSINOPHILS RELATIVE PERCENT: 1.6 % (ref 0–3)
ERYTHROCYTE SEDIMENTATION RATE: 5 MM/HR (ref 0–30)
GFR SERPL CREATININE-BSD FRML MDRD: 53 ML/MIN/1.73M2
GLUCOSE SERPL-MCNC: 97 MG/DL (ref 70–99)
GLUCOSE, URINE: NEGATIVE MG/DL
HCT VFR BLD CALC: 36.5 % (ref 37–47)
HEMOGLOBIN: 12 GM/DL (ref 12.5–16)
IMMATURE NEUTROPHIL %: 0.2 % (ref 0–0.43)
KETONES, URINE: NEGATIVE MG/DL
LEUKOCYTE ESTERASE, URINE: NEGATIVE
LYMPHOCYTES ABSOLUTE: 1.4 K/CU MM
LYMPHOCYTES RELATIVE PERCENT: 21.9 % (ref 24–44)
MCH RBC QN AUTO: 31.3 PG (ref 27–31)
MCHC RBC AUTO-ENTMCNC: 32.9 % (ref 32–36)
MCV RBC AUTO: 95.1 FL (ref 78–100)
MONOCYTES ABSOLUTE: 0.4 K/CU MM
MONOCYTES RELATIVE PERCENT: 6.9 % (ref 0–4)
NITRITE URINE, QUANTITATIVE: NEGATIVE
NUCLEATED RBC %: 0 %
PDW BLD-RTO: 12.8 % (ref 11.7–14.9)
PH, URINE: 8 (ref 5–8)
PLATELET # BLD: 213 K/CU MM (ref 140–440)
PMV BLD AUTO: 9.8 FL (ref 7.5–11.1)
POTASSIUM SERPL-SCNC: 4 MMOL/L (ref 3.5–5.1)
PROTEIN UA: NEGATIVE MG/DL
RBC # BLD: 3.84 M/CU MM (ref 4.2–5.4)
SEGMENTED NEUTROPHILS ABSOLUTE COUNT: 4.4 K/CU MM
SEGMENTED NEUTROPHILS RELATIVE PERCENT: 68.6 % (ref 36–66)
SODIUM BLD-SCNC: 142 MMOL/L (ref 135–145)
SPECIFIC GRAVITY UA: 1.02 (ref 1–1.03)
TOTAL IMMATURE NEUTOROPHIL: 0.01 K/CU MM
TOTAL NUCLEATED RBC: 0 K/CU MM
TOTAL PROTEIN: 6.7 GM/DL (ref 6.4–8.2)
UROBILINOGEN, URINE: 0.2 MG/DL (ref 0.2–1)
WBC # BLD: 6.3 K/CU MM (ref 4–10.5)

## 2024-03-13 PROCEDURE — 81003 URINALYSIS AUTO W/O SCOPE: CPT

## 2024-03-13 PROCEDURE — 85652 RBC SED RATE AUTOMATED: CPT

## 2024-03-13 PROCEDURE — 36415 COLL VENOUS BLD VENIPUNCTURE: CPT

## 2024-03-13 PROCEDURE — 71046 X-RAY EXAM CHEST 2 VIEWS: CPT

## 2024-03-13 PROCEDURE — 80053 COMPREHEN METABOLIC PANEL: CPT

## 2024-03-13 PROCEDURE — 85025 COMPLETE CBC W/AUTO DIFF WBC: CPT

## 2024-03-13 PROCEDURE — 87086 URINE CULTURE/COLONY COUNT: CPT

## 2024-03-13 ASSESSMENT — PAIN SCALES - GENERAL: PAINLEVEL_OUTOF10: 7

## 2024-03-13 ASSESSMENT — PAIN DESCRIPTION - FREQUENCY: FREQUENCY: CONTINUOUS

## 2024-03-13 NOTE — PROGRESS NOTES
.Surgery @ [facility] on 3/26/24 you will be called 3/25/24 with times    NOTHING TO EAT OR DRINK AFTER MIDNIGHT DAY OF SURGERY    1. Enter thru the hospital main entrance on day of surgery, check in at the Information Desk. If you arrive prior to 6:00am, enter thru the ER entrance.    2. Follow the directions as prescribed by the doctor for your procedure and medications.         Morning of surgery take: Isosorbide with sips of water          Stop vitamins, supplements and NSAIDS:  3/18/2024    3. Check with your Doctor regarding stopping blood thinners and follow their instructions.    4. Do not smoke, vape or use chewing tobacco morning of surgery. Do not drink any alcoholic beverages 24 hours prior to surgery.       This includes NA Beer. No street drugs 7 days prior to surgery.    5. If you have dentures, contacts of glasses they will be removed before going to the OR; please bring a case.    6. Please bring picture ID, insurance card, paperwork from the doctor’s office (H & P, Consent, & card for implantable devices).    7. Take a shower with an antibacterial soap the night before surgery and the morning of surgery. Do not put anything on your skin      After your morning shower.    8. You will need a responsible adult to drive you home and check on you after surgery.

## 2024-03-14 ENCOUNTER — HOSPITAL ENCOUNTER (OUTPATIENT)
Dept: CT IMAGING | Age: 74
Discharge: HOME | End: 2024-03-14
Attending: STUDENT IN AN ORGANIZED HEALTH CARE EDUCATION/TRAINING PROGRAM
Payer: MEDICARE

## 2024-03-14 DIAGNOSIS — M75.121 NONTRAUMATIC COMPLETE TEAR OF RIGHT ROTATOR CUFF: ICD-10-CM

## 2024-03-14 DIAGNOSIS — G89.29 CHRONIC PAIN IN RIGHT SHOULDER: ICD-10-CM

## 2024-03-14 DIAGNOSIS — M25.511 CHRONIC PAIN IN RIGHT SHOULDER: ICD-10-CM

## 2024-03-14 LAB
CULTURE: NORMAL
Lab: NORMAL
SPECIMEN: NORMAL

## 2024-03-14 PROCEDURE — 73200 CT UPPER EXTREMITY W/O DYE: CPT

## 2024-03-18 ENCOUNTER — TELEPHONE (OUTPATIENT)
Dept: ORTHOPEDIC SURGERY | Age: 74
End: 2024-03-18

## 2024-03-18 NOTE — TELEPHONE ENCOUNTER
[facility] called in asking for patient to have antibiotics prior to her 03/21 appt for fillings. Per Dr. Mary 4 500 mg tabs of amoxicillin one hour prior to her dental fillings and 3 tabs of amoxicillin 6 hours after dental fillings.

## 2024-03-20 ENCOUNTER — NURSE ONLY (OUTPATIENT)
Dept: CARDIOLOGY CLINIC | Age: 74
End: 2024-03-20

## 2024-03-20 VITALS
WEIGHT: 150 LBS | HEART RATE: 64 BPM | DIASTOLIC BLOOD PRESSURE: 78 MMHG | SYSTOLIC BLOOD PRESSURE: 136 MMHG | BODY MASS INDEX: 25.75 KG/M2

## 2024-03-25 ENCOUNTER — ANESTHESIA EVENT (OUTPATIENT)
Dept: OPERATING ROOM | Age: 74
End: 2024-03-25
Payer: MEDICARE

## 2024-03-25 NOTE — ANESTHESIA PRE PROCEDURE
Department of Anesthesiology  Preprocedure Note       Name:  Madiha Martin   Age:  73 y.o.  :  1950                                          MRN:  [id number]         Date:  3/25/2024      Surgeon: Surgeon(s):  Maximo Mary DO    Procedure: Procedure(s):  RIGHT SHOULDER TOTAL ARTHROPLASTY REVERSE  RIGHT BICEPS TENODESIS    Medications prior to admission:   Prior to Admission medications    Medication Sig Start Date End Date Taking? Authorizing Provider   isosorbide mononitrate (IMDUR) 30 MG extended release tablet Take 1 tablet by mouth daily 24   Lloyd Whitehead MD   nitroGLYCERIN (NITROSTAT) 0.4 MG SL tablet Place 1 tablet under the tongue every 5 minutes as needed for Chest pain 24   Lloyd Whitehead MD   tiZANidine (ZANAFLEX) 2 MG tablet Take 1-2 tablets by mouth 2 times daily as needed (Muscle pain/spasm) 1/10/24   Jake Dumont MD   triamterene-hydroCHLOROthiazide (MAXZIDE-25) 37.5-25 MG per tablet TAKE 1/2 TABLET DAILY 1/10/24   Jake Dumont MD   atorvastatin (LIPITOR) 20 MG tablet TAKE 1 TABLET DAILY 1/10/24   Jake Dumont MD   Magnesium 400 MG CAPS Take 200 mg by mouth nightly Pt states take .5 only at night    Thony Rdz MD   aspirin 81 MG EC tablet Take 1 tablet by mouth daily 20   Lloyd Whitehead MD   DENTA 5000 PLUS 1.1 % CREA Take 1 Dose by mouth nightly 10/14/19   Thony Rdz MD   Omega-3 Fatty Acids (FISH OIL) 1000 MG CAPS Take 3 capsules by mouth 2 times daily    Thony Rdz MD   Calcium Carbonate-Vitamin D (CALCIUM 600+D HIGH POTENCY PO) Take by mouth    Thony Rdz MD   Multiple Vitamins-Minerals (HAIR SKIN AND NAILS FORMULA PO) Take by mouth    Thony Rdz MD   simethicone (MYLICON) 80 MG chewable tablet Take 1 tablet by mouth every 6 hours as needed for Flatulence    Thony Rdz MD   Lactase (LACTAID PO) Take by mouth as needed     Thony Rdz MD       Current

## 2024-03-26 ENCOUNTER — APPOINTMENT (OUTPATIENT)
Dept: GENERAL RADIOLOGY | Age: 74
End: 2024-03-26
Attending: STUDENT IN AN ORGANIZED HEALTH CARE EDUCATION/TRAINING PROGRAM
Payer: MEDICARE

## 2024-03-26 ENCOUNTER — HOSPITAL ENCOUNTER (OUTPATIENT)
Age: 74
Setting detail: OBSERVATION
Discharge: HOME | End: 2024-03-27
Attending: STUDENT IN AN ORGANIZED HEALTH CARE EDUCATION/TRAINING PROGRAM | Admitting: STUDENT IN AN ORGANIZED HEALTH CARE EDUCATION/TRAINING PROGRAM
Payer: MEDICARE

## 2024-03-26 ENCOUNTER — ANESTHESIA (OUTPATIENT)
Dept: OPERATING ROOM | Age: 74
End: 2024-03-26
Payer: MEDICARE

## 2024-03-26 DIAGNOSIS — M75.101 RIGHT ROTATOR CUFF TEAR ARTHROPATHY: Primary | ICD-10-CM

## 2024-03-26 DIAGNOSIS — M12.811 RIGHT ROTATOR CUFF TEAR ARTHROPATHY: Primary | ICD-10-CM

## 2024-03-26 PROCEDURE — 2580000003 HC RX 258: Performed by: STUDENT IN AN ORGANIZED HEALTH CARE EDUCATION/TRAINING PROGRAM

## 2024-03-26 PROCEDURE — 2580000003 HC RX 258: Performed by: ANESTHESIOLOGY

## 2024-03-26 PROCEDURE — 6360000002 HC RX W HCPCS: Performed by: STUDENT IN AN ORGANIZED HEALTH CARE EDUCATION/TRAINING PROGRAM

## 2024-03-26 PROCEDURE — 6370000000 HC RX 637 (ALT 250 FOR IP): Performed by: STUDENT IN AN ORGANIZED HEALTH CARE EDUCATION/TRAINING PROGRAM

## 2024-03-26 PROCEDURE — C9290 INJ, BUPIVACAINE LIPOSOME: HCPCS

## 2024-03-26 PROCEDURE — C1769 GUIDE WIRE: HCPCS | Performed by: STUDENT IN AN ORGANIZED HEALTH CARE EDUCATION/TRAINING PROGRAM

## 2024-03-26 PROCEDURE — 2500000003 HC RX 250 WO HCPCS: Performed by: NURSE ANESTHETIST, CERTIFIED REGISTERED

## 2024-03-26 PROCEDURE — 3600000013 HC SURGERY LEVEL 3 ADDTL 15MIN: Performed by: STUDENT IN AN ORGANIZED HEALTH CARE EDUCATION/TRAINING PROGRAM

## 2024-03-26 PROCEDURE — 3700000000 HC ANESTHESIA ATTENDED CARE: Performed by: STUDENT IN AN ORGANIZED HEALTH CARE EDUCATION/TRAINING PROGRAM

## 2024-03-26 PROCEDURE — A4217 STERILE WATER/SALINE, 500 ML: HCPCS | Performed by: STUDENT IN AN ORGANIZED HEALTH CARE EDUCATION/TRAINING PROGRAM

## 2024-03-26 PROCEDURE — 7100000000 HC PACU RECOVERY - FIRST 15 MIN: Performed by: STUDENT IN AN ORGANIZED HEALTH CARE EDUCATION/TRAINING PROGRAM

## 2024-03-26 PROCEDURE — 73030 X-RAY EXAM OF SHOULDER: CPT

## 2024-03-26 PROCEDURE — G0378 HOSPITAL OBSERVATION PER HR: HCPCS

## 2024-03-26 PROCEDURE — 2580000003 HC RX 258: Performed by: NURSE ANESTHETIST, CERTIFIED REGISTERED

## 2024-03-26 PROCEDURE — 64415 NJX AA&/STRD BRCH PLXS IMG: CPT | Performed by: ANESTHESIOLOGY

## 2024-03-26 PROCEDURE — 2720000010 HC SURG SUPPLY STERILE: Performed by: STUDENT IN AN ORGANIZED HEALTH CARE EDUCATION/TRAINING PROGRAM

## 2024-03-26 PROCEDURE — 6360000002 HC RX W HCPCS: Performed by: ANESTHESIOLOGY

## 2024-03-26 PROCEDURE — L3650 SO 8 ABD RESTRAINT PRE OTS: HCPCS | Performed by: STUDENT IN AN ORGANIZED HEALTH CARE EDUCATION/TRAINING PROGRAM

## 2024-03-26 PROCEDURE — 7100000001 HC PACU RECOVERY - ADDTL 15 MIN: Performed by: STUDENT IN AN ORGANIZED HEALTH CARE EDUCATION/TRAINING PROGRAM

## 2024-03-26 PROCEDURE — C1776 JOINT DEVICE (IMPLANTABLE): HCPCS | Performed by: STUDENT IN AN ORGANIZED HEALTH CARE EDUCATION/TRAINING PROGRAM

## 2024-03-26 PROCEDURE — 1200000000 HC SEMI PRIVATE

## 2024-03-26 PROCEDURE — C1713 ANCHOR/SCREW BN/BN,TIS/BN: HCPCS | Performed by: STUDENT IN AN ORGANIZED HEALTH CARE EDUCATION/TRAINING PROGRAM

## 2024-03-26 PROCEDURE — 3600000003 HC SURGERY LEVEL 3 BASE: Performed by: STUDENT IN AN ORGANIZED HEALTH CARE EDUCATION/TRAINING PROGRAM

## 2024-03-26 PROCEDURE — 6360000002 HC RX W HCPCS

## 2024-03-26 PROCEDURE — 6360000002 HC RX W HCPCS: Performed by: NURSE ANESTHETIST, CERTIFIED REGISTERED

## 2024-03-26 PROCEDURE — 6370000000 HC RX 637 (ALT 250 FOR IP)

## 2024-03-26 PROCEDURE — 2709999900 HC NON-CHARGEABLE SUPPLY: Performed by: STUDENT IN AN ORGANIZED HEALTH CARE EDUCATION/TRAINING PROGRAM

## 2024-03-26 PROCEDURE — 3700000001 HC ADD 15 MINUTES (ANESTHESIA): Performed by: STUDENT IN AN ORGANIZED HEALTH CARE EDUCATION/TRAINING PROGRAM

## 2024-03-26 RX ORDER — SODIUM CHLORIDE 9 MG/ML
INJECTION, SOLUTION INTRAVENOUS PRN
Status: DISCONTINUED | OUTPATIENT
Start: 2024-03-26 | End: 2024-03-26 | Stop reason: HOSPADM

## 2024-03-26 RX ORDER — DEXAMETHASONE SODIUM PHOSPHATE 4 MG/ML
INJECTION, SOLUTION INTRA-ARTICULAR; INTRALESIONAL; INTRAMUSCULAR; INTRAVENOUS; SOFT TISSUE PRN
Status: DISCONTINUED | OUTPATIENT
Start: 2024-03-26 | End: 2024-03-26 | Stop reason: SDUPTHER

## 2024-03-26 RX ORDER — SODIUM CHLORIDE, SODIUM LACTATE, POTASSIUM CHLORIDE, CALCIUM CHLORIDE 600; 310; 30; 20 MG/100ML; MG/100ML; MG/100ML; MG/100ML
INJECTION, SOLUTION INTRAVENOUS CONTINUOUS
Status: DISCONTINUED | OUTPATIENT
Start: 2024-03-26 | End: 2024-03-26 | Stop reason: HOSPADM

## 2024-03-26 RX ORDER — MIDAZOLAM HYDROCHLORIDE 1 MG/ML
INJECTION INTRAMUSCULAR; INTRAVENOUS
Status: COMPLETED
Start: 2024-03-26 | End: 2024-03-26

## 2024-03-26 RX ORDER — SCOLOPAMINE TRANSDERMAL SYSTEM 1 MG/1
PATCH, EXTENDED RELEASE TRANSDERMAL
Status: COMPLETED
Start: 2024-03-26 | End: 2024-03-26

## 2024-03-26 RX ORDER — SODIUM CHLORIDE 0.9 % (FLUSH) 0.9 %
5-40 SYRINGE (ML) INJECTION PRN
Status: DISCONTINUED | OUTPATIENT
Start: 2024-03-26 | End: 2024-03-27 | Stop reason: HOSPADM

## 2024-03-26 RX ORDER — SCOLOPAMINE TRANSDERMAL SYSTEM 1 MG/1
PATCH, EXTENDED RELEASE TRANSDERMAL PRN
Status: DISCONTINUED | OUTPATIENT
Start: 2024-03-26 | End: 2024-03-26 | Stop reason: SDUPTHER

## 2024-03-26 RX ORDER — FENTANYL CITRATE 50 UG/ML
INJECTION, SOLUTION INTRAMUSCULAR; INTRAVENOUS PRN
Status: DISCONTINUED | OUTPATIENT
Start: 2024-03-26 | End: 2024-03-26 | Stop reason: SDUPTHER

## 2024-03-26 RX ORDER — SODIUM CHLORIDE 0.9 % (FLUSH) 0.9 %
5-40 SYRINGE (ML) INJECTION PRN
Status: DISCONTINUED | OUTPATIENT
Start: 2024-03-26 | End: 2024-03-26 | Stop reason: HOSPADM

## 2024-03-26 RX ORDER — OXYCODONE HYDROCHLORIDE 10 MG/1
10 TABLET ORAL EVERY 4 HOURS PRN
Status: DISCONTINUED | OUTPATIENT
Start: 2024-03-26 | End: 2024-03-27 | Stop reason: HOSPADM

## 2024-03-26 RX ORDER — ONDANSETRON 2 MG/ML
4 INJECTION INTRAMUSCULAR; INTRAVENOUS
Status: DISCONTINUED | OUTPATIENT
Start: 2024-03-26 | End: 2024-03-26 | Stop reason: HOSPADM

## 2024-03-26 RX ORDER — LIDOCAINE HYDROCHLORIDE 20 MG/ML
INJECTION, SOLUTION INTRAVENOUS PRN
Status: DISCONTINUED | OUTPATIENT
Start: 2024-03-26 | End: 2024-03-26 | Stop reason: SDUPTHER

## 2024-03-26 RX ORDER — LABETALOL HYDROCHLORIDE 5 MG/ML
10 INJECTION, SOLUTION INTRAVENOUS
Status: DISCONTINUED | OUTPATIENT
Start: 2024-03-26 | End: 2024-03-26 | Stop reason: HOSPADM

## 2024-03-26 RX ORDER — SODIUM CHLORIDE 0.9 % (FLUSH) 0.9 %
5-40 SYRINGE (ML) INJECTION EVERY 12 HOURS SCHEDULED
Status: DISCONTINUED | OUTPATIENT
Start: 2024-03-26 | End: 2024-03-26 | Stop reason: HOSPADM

## 2024-03-26 RX ORDER — FENTANYL CITRATE 50 UG/ML
50 INJECTION, SOLUTION INTRAMUSCULAR; INTRAVENOUS EVERY 5 MIN PRN
Status: DISCONTINUED | OUTPATIENT
Start: 2024-03-26 | End: 2024-03-26 | Stop reason: HOSPADM

## 2024-03-26 RX ORDER — SODIUM CHLORIDE, SODIUM LACTATE, POTASSIUM CHLORIDE, CALCIUM CHLORIDE 600; 310; 30; 20 MG/100ML; MG/100ML; MG/100ML; MG/100ML
INJECTION, SOLUTION INTRAVENOUS CONTINUOUS
Status: DISCONTINUED | OUTPATIENT
Start: 2024-03-26 | End: 2024-03-27 | Stop reason: HOSPADM

## 2024-03-26 RX ORDER — KETOROLAC TROMETHAMINE 30 MG/ML
15 INJECTION, SOLUTION INTRAMUSCULAR; INTRAVENOUS EVERY 6 HOURS PRN
Status: DISCONTINUED | OUTPATIENT
Start: 2024-03-26 | End: 2024-03-27 | Stop reason: HOSPADM

## 2024-03-26 RX ORDER — ASPIRIN 81 MG/1
325 TABLET, CHEWABLE ORAL 2 TIMES DAILY
Status: DISCONTINUED | OUTPATIENT
Start: 2024-03-27 | End: 2024-03-27 | Stop reason: HOSPADM

## 2024-03-26 RX ORDER — ATORVASTATIN CALCIUM 10 MG/1
20 TABLET, FILM COATED ORAL NIGHTLY
Status: DISCONTINUED | OUTPATIENT
Start: 2024-03-26 | End: 2024-03-27 | Stop reason: HOSPADM

## 2024-03-26 RX ORDER — LIDOCAINE HYDROCHLORIDE 10 MG/ML
INJECTION, SOLUTION EPIDURAL; INFILTRATION; INTRACAUDAL; PERINEURAL
Status: DISPENSED
Start: 2024-03-26 | End: 2024-03-26

## 2024-03-26 RX ORDER — NALOXONE HYDROCHLORIDE 0.4 MG/ML
INJECTION, SOLUTION INTRAMUSCULAR; INTRAVENOUS; SUBCUTANEOUS PRN
Status: DISCONTINUED | OUTPATIENT
Start: 2024-03-26 | End: 2024-03-26 | Stop reason: HOSPADM

## 2024-03-26 RX ORDER — ACETAMINOPHEN 325 MG/1
650 TABLET ORAL EVERY 6 HOURS
Status: DISCONTINUED | OUTPATIENT
Start: 2024-03-26 | End: 2024-03-27 | Stop reason: HOSPADM

## 2024-03-26 RX ORDER — TRANEXAMIC ACID 10 MG/ML
INJECTION, SOLUTION INTRAVENOUS PRN
Status: DISCONTINUED | OUTPATIENT
Start: 2024-03-26 | End: 2024-03-26 | Stop reason: SDUPTHER

## 2024-03-26 RX ORDER — MIDAZOLAM HYDROCHLORIDE 1 MG/ML
INJECTION INTRAMUSCULAR; INTRAVENOUS PRN
Status: DISCONTINUED | OUTPATIENT
Start: 2024-03-26 | End: 2024-03-26 | Stop reason: SDUPTHER

## 2024-03-26 RX ORDER — SODIUM CHLORIDE 0.9 % (FLUSH) 0.9 %
5-40 SYRINGE (ML) INJECTION EVERY 12 HOURS SCHEDULED
Status: DISCONTINUED | OUTPATIENT
Start: 2024-03-26 | End: 2024-03-27 | Stop reason: HOSPADM

## 2024-03-26 RX ORDER — ACETAMINOPHEN 500 MG
1000 TABLET ORAL ONCE
Status: DISCONTINUED | OUTPATIENT
Start: 2024-03-26 | End: 2024-03-26 | Stop reason: HOSPADM

## 2024-03-26 RX ORDER — HYDRALAZINE HYDROCHLORIDE 20 MG/ML
10 INJECTION INTRAMUSCULAR; INTRAVENOUS
Status: DISCONTINUED | OUTPATIENT
Start: 2024-03-26 | End: 2024-03-26 | Stop reason: HOSPADM

## 2024-03-26 RX ORDER — PROPOFOL 10 MG/ML
INJECTION, EMULSION INTRAVENOUS PRN
Status: DISCONTINUED | OUTPATIENT
Start: 2024-03-26 | End: 2024-03-26 | Stop reason: SDUPTHER

## 2024-03-26 RX ORDER — ROCURONIUM BROMIDE 10 MG/ML
INJECTION, SOLUTION INTRAVENOUS PRN
Status: DISCONTINUED | OUTPATIENT
Start: 2024-03-26 | End: 2024-03-26 | Stop reason: SDUPTHER

## 2024-03-26 RX ORDER — PHENYLEPHRINE HCL IN 0.9% NACL 1 MG/10 ML
SYRINGE (ML) INTRAVENOUS PRN
Status: DISCONTINUED | OUTPATIENT
Start: 2024-03-26 | End: 2024-03-26 | Stop reason: SDUPTHER

## 2024-03-26 RX ORDER — SODIUM CHLORIDE 9 MG/ML
INJECTION, SOLUTION INTRAVENOUS PRN
Status: DISCONTINUED | OUTPATIENT
Start: 2024-03-26 | End: 2024-03-27 | Stop reason: HOSPADM

## 2024-03-26 RX ORDER — FENTANYL CITRATE 50 UG/ML
25 INJECTION, SOLUTION INTRAMUSCULAR; INTRAVENOUS EVERY 5 MIN PRN
Status: DISCONTINUED | OUTPATIENT
Start: 2024-03-26 | End: 2024-03-26 | Stop reason: HOSPADM

## 2024-03-26 RX ORDER — ISOSORBIDE MONONITRATE 30 MG/1
30 TABLET, EXTENDED RELEASE ORAL DAILY
Status: DISCONTINUED | OUTPATIENT
Start: 2024-03-27 | End: 2024-03-27 | Stop reason: HOSPADM

## 2024-03-26 RX ORDER — OXYCODONE HYDROCHLORIDE 5 MG/1
5 TABLET ORAL EVERY 4 HOURS PRN
Status: DISCONTINUED | OUTPATIENT
Start: 2024-03-26 | End: 2024-03-27 | Stop reason: HOSPADM

## 2024-03-26 RX ORDER — ONDANSETRON 2 MG/ML
INJECTION INTRAMUSCULAR; INTRAVENOUS PRN
Status: DISCONTINUED | OUTPATIENT
Start: 2024-03-26 | End: 2024-03-26 | Stop reason: SDUPTHER

## 2024-03-26 RX ADMIN — CEFAZOLIN 2000 MG: 2 INJECTION, POWDER, FOR SOLUTION INTRAMUSCULAR; INTRAVENOUS at 11:40

## 2024-03-26 RX ADMIN — PHENYLEPHRINE HYDROCHLORIDE 50 MCG/MIN: 10 INJECTION INTRAVENOUS at 11:35

## 2024-03-26 RX ADMIN — CEFAZOLIN 2000 MG: 2 INJECTION, POWDER, FOR SOLUTION INTRAMUSCULAR; INTRAVENOUS at 20:20

## 2024-03-26 RX ADMIN — OXYCODONE HYDROCHLORIDE 5 MG: 5 TABLET ORAL at 23:25

## 2024-03-26 RX ADMIN — FENTANYL CITRATE 100 MCG: 50 INJECTION, SOLUTION INTRAMUSCULAR; INTRAVENOUS at 11:18

## 2024-03-26 RX ADMIN — ATORVASTATIN CALCIUM 20 MG: 10 TABLET, FILM COATED ORAL at 20:16

## 2024-03-26 RX ADMIN — BUPIVACAINE 20 ML: 13.3 INJECTION, SUSPENSION, LIPOSOMAL INFILTRATION at 10:35

## 2024-03-26 RX ADMIN — SUGAMMADEX 200 MG: 100 INJECTION, SOLUTION INTRAVENOUS at 12:49

## 2024-03-26 RX ADMIN — Medication 0.1 MG: at 11:38

## 2024-03-26 RX ADMIN — OXYCODONE HYDROCHLORIDE 5 MG: 5 TABLET ORAL at 17:14

## 2024-03-26 RX ADMIN — SCOLOPAMINE TRANSDERMAL SYSTEM 1 PATCH: 1 PATCH, EXTENDED RELEASE TRANSDERMAL at 10:47

## 2024-03-26 RX ADMIN — ACETAMINOPHEN 650 MG: 325 TABLET ORAL at 15:21

## 2024-03-26 RX ADMIN — ONDANSETRON 4 MG: 2 INJECTION INTRAMUSCULAR; INTRAVENOUS at 11:18

## 2024-03-26 RX ADMIN — DEXAMETHASONE SODIUM PHOSPHATE 4 MG: 4 INJECTION, SOLUTION INTRAMUSCULAR; INTRAVENOUS at 11:18

## 2024-03-26 RX ADMIN — Medication 0.1 MG: at 11:32

## 2024-03-26 RX ADMIN — MIDAZOLAM 2 MG: 1 INJECTION INTRAMUSCULAR; INTRAVENOUS at 10:34

## 2024-03-26 RX ADMIN — SODIUM CHLORIDE, POTASSIUM CHLORIDE, SODIUM LACTATE AND CALCIUM CHLORIDE: 600; 310; 30; 20 INJECTION, SOLUTION INTRAVENOUS at 17:12

## 2024-03-26 RX ADMIN — Medication 0.1 MG: at 11:30

## 2024-03-26 RX ADMIN — ACETAMINOPHEN 650 MG: 325 TABLET ORAL at 20:16

## 2024-03-26 RX ADMIN — FENTANYL CITRATE 50 MCG: 50 INJECTION, SOLUTION INTRAMUSCULAR; INTRAVENOUS at 13:45

## 2024-03-26 RX ADMIN — PROPOFOL 150 MG: 10 INJECTION, EMULSION INTRAVENOUS at 11:22

## 2024-03-26 RX ADMIN — SODIUM CHLORIDE, POTASSIUM CHLORIDE, SODIUM LACTATE AND CALCIUM CHLORIDE: 600; 310; 30; 20 INJECTION, SOLUTION INTRAVENOUS at 12:00

## 2024-03-26 RX ADMIN — LIDOCAINE HYDROCHLORIDE 50 MG: 20 INJECTION, SOLUTION INTRAVENOUS at 11:22

## 2024-03-26 RX ADMIN — FENTANYL CITRATE 50 MCG: 50 INJECTION, SOLUTION INTRAMUSCULAR; INTRAVENOUS at 14:16

## 2024-03-26 RX ADMIN — FENTANYL CITRATE 50 MCG: 50 INJECTION, SOLUTION INTRAMUSCULAR; INTRAVENOUS at 13:22

## 2024-03-26 RX ADMIN — SODIUM CHLORIDE, POTASSIUM CHLORIDE, SODIUM LACTATE AND CALCIUM CHLORIDE: 600; 310; 30; 20 INJECTION, SOLUTION INTRAVENOUS at 08:38

## 2024-03-26 RX ADMIN — ROCURONIUM BROMIDE 50 MG: 10 INJECTION, SOLUTION INTRAVENOUS at 11:22

## 2024-03-26 RX ADMIN — TRANEXAMIC ACID 1000 MG: 10 INJECTION, SOLUTION INTRAVENOUS at 11:40

## 2024-03-26 ASSESSMENT — PAIN DESCRIPTION - FREQUENCY
FREQUENCY: INTERMITTENT
FREQUENCY: CONTINUOUS

## 2024-03-26 ASSESSMENT — PAIN DESCRIPTION - DESCRIPTORS
DESCRIPTORS: ACHING;BURNING
DESCRIPTORS: BURNING;THROBBING
DESCRIPTORS: ACHING
DESCRIPTORS: BURNING;THROBBING
DESCRIPTORS: BURNING

## 2024-03-26 ASSESSMENT — PAIN SCALES - GENERAL
PAINLEVEL_OUTOF10: 4
PAINLEVEL_OUTOF10: 3
PAINLEVEL_OUTOF10: 9
PAINLEVEL_OUTOF10: 2
PAINLEVEL_OUTOF10: 5
PAINLEVEL_OUTOF10: 7
PAINLEVEL_OUTOF10: 5
PAINLEVEL_OUTOF10: 9
PAINLEVEL_OUTOF10: 4
PAINLEVEL_OUTOF10: 1

## 2024-03-26 ASSESSMENT — ENCOUNTER SYMPTOMS
COUGH: 0
COLOR CHANGE: 0
BACK PAIN: 0
CONSTIPATION: 0
SINUS PAIN: 0
SINUS PRESSURE: 0
FACIAL SWELLING: 0
NAUSEA: 0
WHEEZING: 0
SORE THROAT: 0
VOMITING: 0
ABDOMINAL PAIN: 0
SHORTNESS OF BREATH: 0
STRIDOR: 0
DIARRHEA: 0
PHOTOPHOBIA: 0
EYE PAIN: 0
EYE REDNESS: 0

## 2024-03-26 ASSESSMENT — PAIN DESCRIPTION - ORIENTATION
ORIENTATION: RIGHT

## 2024-03-26 ASSESSMENT — PAIN DESCRIPTION - PAIN TYPE
TYPE: SURGICAL PAIN

## 2024-03-26 ASSESSMENT — PAIN DESCRIPTION - ONSET
ONSET: ON-GOING

## 2024-03-26 ASSESSMENT — PAIN DESCRIPTION - LOCATION
LOCATION: SHOULDER
LOCATION: SHOULDER;ARM
LOCATION: SHOULDER

## 2024-03-26 ASSESSMENT — PAIN - FUNCTIONAL ASSESSMENT
PAIN_FUNCTIONAL_ASSESSMENT: PREVENTS OR INTERFERES SOME ACTIVE ACTIVITIES AND ADLS
PAIN_FUNCTIONAL_ASSESSMENT: ACTIVITIES ARE NOT PREVENTED
PAIN_FUNCTIONAL_ASSESSMENT: PREVENTS OR INTERFERES SOME ACTIVE ACTIVITIES AND ADLS
PAIN_FUNCTIONAL_ASSESSMENT: 0-10
PAIN_FUNCTIONAL_ASSESSMENT: PREVENTS OR INTERFERES SOME ACTIVE ACTIVITIES AND ADLS
PAIN_FUNCTIONAL_ASSESSMENT: ACTIVITIES ARE NOT PREVENTED
PAIN_FUNCTIONAL_ASSESSMENT: PREVENTS OR INTERFERES SOME ACTIVE ACTIVITIES AND ADLS

## 2024-03-26 ASSESSMENT — PAIN SCALES - WONG BAKER: WONGBAKER_NUMERICALRESPONSE: NO HURT

## 2024-03-26 NOTE — OP NOTE
minutes) cryotherapy        Wound Care:  Daily dressing change with a sterile gauze bandage.  Okay for gentle cleaning with soap and water.  Okay for showering.  No soaking the incisions.  You may leave the incisions open to air once there is no further evidence of drainage on the bandages.  Call Dr. Mary if there is concern for infection (purulent drainage, induration, severe redness).  Maintain mesh Prineo dressing until follow-up appointment.      Anticoagulation:   mg BID     Pain Medication:  Use limited amounts of narcotic pain medication for severe pain.  Taper off of this medication as soon as possible.    Use of Tylenol and/or ibuprofen is preferred for pain.    Follow up:  Follow-up with Dr. Mary 2-3 weeks after the surgical procedure.  You can call [phone] to schedule the appointment.     Electronically signed by Maximo Mary DO on 3/26/2024 at 12:31 PM

## 2024-03-26 NOTE — ANESTHESIA PROCEDURE NOTES
Peripheral Block    Patient location during procedure: holding area  Reason for block: procedure for pain, post-op pain management and at surgeon's request  Start time: 3/26/2024 10:35 AM  End time: 3/26/2024 10:42 AM  Staffing  Performed: resident/CRNA   Resident/CRNA: Tejas Bowman APRN - CRNA  Performed by: Tejas Bowman APRN - CRNA  Authorized by: Tejas Bowman APRN - CRNA    Preanesthetic Checklist  Completed: patient identified, IV checked, site marked, risks and benefits discussed, surgical/procedural consents, equipment checked, pre-op evaluation, timeout performed, anesthesia consent given, oxygen available, monitors applied/VS acknowledged, fire risk safety assessment completed and verbalized and blood product R/B/A discussed and consented  Peripheral Block   Patient position: supine  Prep: ChloraPrep  Provider prep: mask and sterile gloves  Patient monitoring: cardiac monitor, continuous pulse ox, continuous capnometry, frequent blood pressure checks, IV access, oxygen and responsive to questions  Block type: Brachial plexus  Interscalene  Laterality: right  Injection technique: single-shot  Guidance: nerve stimulator and ultrasound guided  Local infiltration: lidocaine  Infiltration strength: 1 %  Local infiltration: lidocaine  Dose: 1 mL    Needle   Needle type: insulated echogenic nerve stimulator needle   Needle gauge: 22 G  Needle localization: nerve stimulator and ultrasound guidance  Test dose: negative  Needle length: 10 cm  Assessment   Injection assessment: negative aspiration for heme, no paresthesia on injection, local visualized surrounding nerve on ultrasound, no intravascular symptoms and low pressure verified by pressure monitor  Paresthesia pain: none  Slow fractionated injection: yes  Hemodynamics: stable  Outcomes: uncomplicated    Additional Notes  Twitch extinguished 0.5mA  Medications Administered  BUPivacaine liposome (EXPAREL) injection 1.3% - Perineural, Shoulder Right   20

## 2024-03-26 NOTE — H&P
significant prevertebral soft tissue swelling. There is moderate  disc space narrowing at C5-6 and C6-7 with anterior endplate spurring and  ossification of the anterior longitudinal ligament.Disc space narrowing at  C6-7 has mildly progressed.     IMPRESSION:  Degenerative changes of the lower cervical spine.      4 views of the right shoulder from outside facility demonstrate:  No acute fracture or dislocation.  Joint alignment is maintained.  There are mild degenerative changes of the AC  joint and glenohumeral joint.  No erosions are present  No evidence of rotator cuff calcification.  IMPRESSION:  1. No acute osseous abnormality of the right shoulder.  2. Mild AC joint and glenohumeral joint osteoarthritis.    Type I acromion    Office Procedures:  No orders of the defined types were placed in this encounter.        Assessment and Plan    A: Right biceps tendinitis -resolved   Right rotator cuff strain    P:   I had a very lengthy conversation with the patient regarding her MRI findings and the severity of her rotator cuff tearing.  I explained that the tears are not amenable to repair and I do feel that the best and most appropriate option including with conservative treatment being considered is a reverse total shoulder arthroplasty.  However, I did discuss with the patient conservative measures versus other operative interventions but I again reiterated that I do feel that based off the MRI and physical exam findings she is a candidate for reverse total shoulder arthroplasty which I described to her in detail today. Due to the patient's significant symptoms including pain and lack of function in the shoulder, they would like to proceed with surgical intervention.  I discussed surgical intervention in the form of:     Right reverse total shoulder arthroplasty with biceps tenodesis     I explained risks, benefits, possible complications of the procedure and answered all questions for the patient.    I

## 2024-03-27 VITALS
SYSTOLIC BLOOD PRESSURE: 114 MMHG | HEART RATE: 64 BPM | TEMPERATURE: 98.2 F | DIASTOLIC BLOOD PRESSURE: 52 MMHG | WEIGHT: 150 LBS | HEIGHT: 64 IN | RESPIRATION RATE: 16 BRPM | OXYGEN SATURATION: 97 % | BODY MASS INDEX: 25.61 KG/M2

## 2024-03-27 PROBLEM — M75.101 RIGHT ROTATOR CUFF TEAR ARTHROPATHY: Status: ACTIVE | Noted: 2024-03-27

## 2024-03-27 PROBLEM — M12.811 RIGHT ROTATOR CUFF TEAR ARTHROPATHY: Status: ACTIVE | Noted: 2024-03-27

## 2024-03-27 LAB
ANION GAP SERPL CALCULATED.3IONS-SCNC: 8 MMOL/L (ref 7–16)
BASOPHILS ABSOLUTE: 0 K/CU MM
BASOPHILS RELATIVE PERCENT: 0.1 % (ref 0–1)
BUN SERPL-MCNC: 20 MG/DL (ref 6–23)
CALCIUM SERPL-MCNC: 8.7 MG/DL (ref 8.3–10.6)
CHLORIDE BLD-SCNC: 106 MMOL/L (ref 99–110)
CO2: 27 MMOL/L (ref 21–32)
CREAT SERPL-MCNC: 1.1 MG/DL (ref 0.6–1.1)
DIFFERENTIAL TYPE: ABNORMAL
EOSINOPHILS ABSOLUTE: 0 K/CU MM
EOSINOPHILS RELATIVE PERCENT: 0 % (ref 0–3)
GFR SERPL CREATININE-BSD FRML MDRD: 53 ML/MIN/1.73M2
GLUCOSE SERPL-MCNC: 140 MG/DL (ref 70–99)
HCT VFR BLD CALC: 30.1 % (ref 37–47)
HEMOGLOBIN: 9.9 GM/DL (ref 12.5–16)
IMMATURE NEUTROPHIL %: 0.3 % (ref 0–0.43)
LYMPHOCYTES ABSOLUTE: 0.6 K/CU MM
LYMPHOCYTES RELATIVE PERCENT: 6 % (ref 24–44)
MCH RBC QN AUTO: 31.7 PG (ref 27–31)
MCHC RBC AUTO-ENTMCNC: 32.9 % (ref 32–36)
MCV RBC AUTO: 96.5 FL (ref 78–100)
MONOCYTES ABSOLUTE: 0.5 K/CU MM
MONOCYTES RELATIVE PERCENT: 4.8 % (ref 0–4)
NUCLEATED RBC %: 0 %
PDW BLD-RTO: 12.9 % (ref 11.7–14.9)
PLATELET # BLD: 157 K/CU MM (ref 140–440)
PMV BLD AUTO: 10.8 FL (ref 7.5–11.1)
POTASSIUM SERPL-SCNC: 4.3 MMOL/L (ref 3.5–5.1)
RBC # BLD: 3.12 M/CU MM (ref 4.2–5.4)
SEGMENTED NEUTROPHILS ABSOLUTE COUNT: 8.4 K/CU MM
SEGMENTED NEUTROPHILS RELATIVE PERCENT: 88.8 % (ref 36–66)
SODIUM BLD-SCNC: 141 MMOL/L (ref 135–145)
TOTAL IMMATURE NEUTOROPHIL: 0.03 K/CU MM
TOTAL NUCLEATED RBC: 0 K/CU MM
WBC # BLD: 9.4 K/CU MM (ref 4–10.5)

## 2024-03-27 PROCEDURE — 97165 OT EVAL LOW COMPLEX 30 MIN: CPT

## 2024-03-27 PROCEDURE — 6370000000 HC RX 637 (ALT 250 FOR IP): Performed by: STUDENT IN AN ORGANIZED HEALTH CARE EDUCATION/TRAINING PROGRAM

## 2024-03-27 PROCEDURE — 97535 SELF CARE MNGMENT TRAINING: CPT

## 2024-03-27 PROCEDURE — 97161 PT EVAL LOW COMPLEX 20 MIN: CPT

## 2024-03-27 PROCEDURE — G0378 HOSPITAL OBSERVATION PER HR: HCPCS

## 2024-03-27 PROCEDURE — 2580000003 HC RX 258: Performed by: STUDENT IN AN ORGANIZED HEALTH CARE EDUCATION/TRAINING PROGRAM

## 2024-03-27 PROCEDURE — 6360000002 HC RX W HCPCS: Performed by: STUDENT IN AN ORGANIZED HEALTH CARE EDUCATION/TRAINING PROGRAM

## 2024-03-27 PROCEDURE — 36415 COLL VENOUS BLD VENIPUNCTURE: CPT

## 2024-03-27 PROCEDURE — 85025 COMPLETE CBC W/AUTO DIFF WBC: CPT

## 2024-03-27 PROCEDURE — 80048 BASIC METABOLIC PNL TOTAL CA: CPT

## 2024-03-27 PROCEDURE — 97116 GAIT TRAINING THERAPY: CPT

## 2024-03-27 RX ORDER — ASPIRIN 325 MG
325 TABLET ORAL 2 TIMES DAILY
Qty: 60 TABLET | Refills: 1 | Status: SHIPPED | OUTPATIENT
Start: 2024-03-27

## 2024-03-27 RX ORDER — TIZANIDINE 2 MG/1
2 TABLET ORAL 3 TIMES DAILY PRN
Qty: 30 TABLET | Refills: 0 | Status: SHIPPED | OUTPATIENT
Start: 2024-03-27

## 2024-03-27 RX ORDER — OXYCODONE HYDROCHLORIDE AND ACETAMINOPHEN 5; 325 MG/1; MG/1
1 TABLET ORAL EVERY 6 HOURS PRN
Qty: 28 TABLET | Refills: 0 | Status: SHIPPED | OUTPATIENT
Start: 2024-03-27 | End: 2024-04-03

## 2024-03-27 RX ORDER — ONDANSETRON 4 MG/1
4 TABLET, ORALLY DISINTEGRATING ORAL 3 TIMES DAILY PRN
Qty: 21 TABLET | Refills: 0 | Status: SHIPPED | OUTPATIENT
Start: 2024-03-27

## 2024-03-27 RX ADMIN — ISOSORBIDE MONONITRATE 30 MG: 30 TABLET, EXTENDED RELEASE ORAL at 08:34

## 2024-03-27 RX ADMIN — ASPIRIN 325 MG: 81 TABLET, CHEWABLE ORAL at 08:34

## 2024-03-27 RX ADMIN — ACETAMINOPHEN 650 MG: 325 TABLET ORAL at 03:45

## 2024-03-27 RX ADMIN — ACETAMINOPHEN 650 MG: 325 TABLET ORAL at 08:34

## 2024-03-27 RX ADMIN — CEFAZOLIN 2000 MG: 2 INJECTION, POWDER, FOR SOLUTION INTRAMUSCULAR; INTRAVENOUS at 03:47

## 2024-03-27 NOTE — CONSULTS
[facility] ACUTE CARE PHYSICAL THERAPY EVALUATION  Madiha Martin, 1950, 1107/1107-A, 3/27/2024    History  [place]:  The encounter diagnosis was Right rotator cuff tear arthropathy.  Patient  has a past medical history of Anxiety state, Arthritis, Benign essential HTN, Breast lump, Bunion of great toe of right foot, CKD (chronic kidney disease), stage II, Colon polyp, Family history of early CAD, GERD (gastroesophageal reflux disease), History of nuclear stress test, Hyperlipidemia, Internal hemorrhoids, Irritable bowel syndrome, Nausea & vomiting, Osteoarthritis, PONV (postoperative nausea and vomiting), and Rosacea.  Patient  has a past surgical history that includes  section (); Tubal ligation (); Appendectomy (); Breast surgery (); Cholecystectomy (); Cardiac catheterization (); tumor removal; Foot Tendon Surgery; Bunionectomy (Left); Foot surgery; and Colonoscopy (2018).    Discharge Recommendation: Recommend home with assist, including  assistance    Subjective:    Patient states:  \"I didn't know I had this until I was doing some arm exercises at therapy.\"      Pain:  denies pain.      Communication with other providers:  Handoff to Ita HANKINS, partial co-evaluation with SATISH Knowles student, SATISH Cramer supervising for safety and tolerance    Restrictions: General Precautions, Fall Risk, NWB RUE, AROM of elbow, wrist, fingers only in RUE    Home Setup/Prior level of function  Social/Functional History  Lives With: Spouse  Type of Home: House  Home Layout: One level  Home Access: Stairs to enter without rails  Entrance Stairs - Number of Steps: 2  Bathroom Shower/Tub: Tub/Shower unit  Bathroom Toilet: Standard  Bathroom Equipment: None  Home Equipment: None  Has the patient had two or more falls in the past year or any fall with injury in the past year?: Yes (2)  ADL Assistance: Independent  Homemaking Assistance: Independent  Homemaking Responsibilities: 
Status: She is alert and oriented to person, place, and time. Mental status is at baseline.   Psychiatric:         Mood and Affect: Mood normal.         Behavior: Behavior normal.         Thought Content: Thought content normal.         Judgment: Judgment normal.         Past Medical History:   PMHx   Past Medical History:   Diagnosis Date    Anxiety state     Arthritis     Benign essential HTN     Breast lump     Bunion of great toe of right foot     CKD (chronic kidney disease), stage II     Colon polyp 10/31/2014    Family history of early CAD     father had CABG in his 50s and 60s, brother had stroke and MI in his 70s. son had MI in 40s other son stroke at young age    GERD (gastroesophageal reflux disease)     History of nuclear stress test 2020    EF 71%. Normal study.    Hyperlipidemia     Internal hemorrhoids 10/31/2014    Irritable bowel syndrome     Nausea & vomiting     Osteoarthritis     PONV (postoperative nausea and vomiting)     Rosacea      PSHX:  has a past surgical history that includes  section (); Tubal ligation (); Appendectomy (); Breast surgery (); Cholecystectomy (); Cardiac catheterization (); tumor removal; Foot Tendon Surgery; Bunionectomy (Left); Foot surgery; and Colonoscopy (2018).  Allergies:   Allergies   Allergen Reactions    Clindamycin/Lincomycin Swelling     Lips and tongue    Gabapentin Nausea Only and Other (See Comments)     Dizziness/nausea    Pregabalin      Visual disturbance    Methylprednisolone Rash     Red face     Fam HX: family history includes Arthritis in her mother; Atrial Fibrillation in her mother; Cancer in her father; Heart Disease in her father; High Blood Pressure in her brother, brother, and father; Kidney Disease in her brother; Other (age of onset: 25) in her son; Parkinson's Disease in her brother; Stroke in her brother.  Soc HX:   Social History     Socioeconomic History    Marital status:      Spouse

## 2024-03-27 NOTE — PLAN OF CARE
Problem: Discharge Planning  Goal: Discharge to home or other facility with appropriate resources  3/27/2024 0959 by Marge Aquino LPN  Outcome: Completed     Problem: Pain  Goal: Verbalizes/displays adequate comfort level or baseline comfort level  3/27/2024 0959 by Marge Aquino LPN  Outcome: Completed  Problem: Safety - Adult  Goal: Free from fall injury  3/27/2024 0959 by Marge Aquino LPN  Outcome: Completed

## 2024-03-27 NOTE — DISCHARGE SUMMARY
DISCHARGE SUMMARY:  Maximo Mary DO,    Date of Admission:      3/26/2024  Date of Discharge:    3/27/2024     Admission Diagnosis   Complete tear of right rotator cuff, unspecified whether traumatic [M75.121]  Chronic right shoulder pain [M25.511, G89.29]  Right rotator cuff tear arthropathy [M75.101, M12.811]   Discharge Diagnosis   Same    Procedure: Right reverse total shoulder replacement with biceps tenodeses 3/26/2024    Consultations:  IP CONSULT TO HOSPITALIST    Brief history and hospital stay.    Madiha Martin is a 73 y.o. female who underwent Right reverse total shoulder arthroplasty procedure without complication.  Madiha Martin was admitted to the floor following surgery.    Appropriate consults were obtained as outlined in progress notes. The patient’s diet was advanced as tolerated.  The patient remained hemodynamically stable and neurovascularly intact in the bilateral upper extremities throughout the hospital course.    On the day of discharge the patient's calf remained soft and showed no evidence of DVT.      Physical therapy and occupational therapy were consulted.  The patient progressed well with PT/OT throughout the stay.      DVT prophylaxis was initiated and will continue for 6 weeks.     The patients pain was controlled initially with IV pain medications and then was transitioned to oral pain medications prior to discharge    The patient was discharged to Home and will continue to follow the precautions outlined to them by us and PT/OT.     Condition on Discharge: Stable    Medications       Medication List        START taking these medications      aspirin 325 MG tablet  Commonly known as: Catrina Aspirin  Take 1 tablet by mouth in the morning and at bedtime  Replaces: aspirin 81 MG EC tablet     ondansetron 4 MG disintegrating tablet  Commonly known as: ZOFRAN-ODT  Take 1 tablet by mouth 3 times daily as needed for Nausea or Vomiting

## 2024-03-27 NOTE — DISCHARGE INSTRUCTIONS
degrees     · External rotation (ER) in scapular plane as indicated by operative findings - Typically 30 degrees.      · No Internal Rotation (IR) range of motion (ROM)      Active/Active Assisted ROM (A/AAROM) of cervical spine, elbow, wrist, and hand      Begin periscapular sub-maximal pain-free isometrics in the scapular plane      Continuous cryotherapy for first 72 hours postoperatively     · Encourage frequent application afterwards (4-5 times a day for about 20 minutes)      Ensure patient is independent in bed mobility, transfers and ambulation      Insure proper sling fit/alignment/use      Instruct patient in proper positioning, posture, initial home exercise program      Provide patient/ family with written home program including exercises and protocol information.Typically 30 degrees        Day 5 to 21:      Continue above exercises (typically 2-3 times per day)      Begin sub-maximal pain-free deltoid isometrics in scapular plane (avoid shoulder extension when isolating posterior deltoid.)      Frequent (4-5 times a day for about 20 minutes) cryotherapy

## 2024-03-27 NOTE — PROGRESS NOTES
Madiha Martin (1950)    Daily Progress Note-  Maximo Mary DO                   Today's Date:   3/27/2024          Subjective:   Patient seen and examined resting company bed  Doing well postoperatively.    Pain controlled  No issues overnight per nursing staff  Patient is yet to work with therapy.  Block is partially worn off.   is at bedside.  No questions about yesterday's procedure    Objective:    Patient Vitals for the past 4 hrs:   BP Temp Temp src Pulse Resp SpO2   03/27/24 0330 (!) 110/49 98.1 °F (36.7 °C) Oral 55 16 96 %        Vitals:    03/27/24 0330   BP: (!) 110/49   Pulse: 55   Resp: 16   Temp: 98.1 °F (36.7 °C)   SpO2: 96%        Physical Exam:     The patient is awake and alert  Resting comfortably in bed    Operative extremity:    The dressing is clean dry and intact  Sling currently in place  Sensation and motor function intact distally. Block has partially worn off and patient is fully neurovascular intact  Arm compartments are soft and compressible    No additional areas of tenderness or pain in the bilateral lower extremities or contralateral upper extremity.  Neurovascularly intact throughout bilateral lower extremities and contralateral upper extremity    Brisk capillary refill and negative Homans bilaterally.  Compartments are soft and compressible      LABS   CBC:   Recent Labs     03/27/24  0246   WBC 9.4   HGB 9.9*          IMAGING   2 views of the right shoulder were obtained. Patient is status post right shoulder reverse arthroplasty with anatomic alignment. No periprosthetic fracture or evidence of immediate complication.     IMPRESSION:  1. Uncomplicated right shoulder reverse arthroplasty.    Assessment and Plan     1.  POD # 1 right reverse total shoulder arthroplasty with biceps tenodesis    1:  Physical, Occupational therapy consult for 
1306- pt received from OR. Monitors placed and alarms on. Report received from Aunpama CHAN. Pt drowsy but arouses to name being called. Denies any pain or nausea.  1322- pt medicated for complaints of pain.  1337- pt repositioned in bed. Linens clean and dry.  1345- pt medicated for complaints of pain.  1347- xray at bedside.  1400- pt resting comfortably. States pain is now tolerable. Denies any nausea.   1415- report called to LORA Hobbs prior to transport to inpatient room. Awaiting inpatient room to be cleaned.   1416- pt medicated for complaints of pain.  1430- pt resting comfortably. States pain is tolerable. Denies any nausea.   1435- pt transported to inpatient room.  
4 Eyes Skin Assessment     NAME:  Madiha Martin  YOB: 1950  MEDICAL RECORD NUMBER:  [id number]    The patient is being assessed for  Admission    I agree that at least one RN has performed a thorough Head to Toe Skin Assessment on the patient. ALL assessment sites listed below have been assessed.      Areas assessed by both nurses:    Head, Face, Ears, Shoulders, Back, Chest, Arms, Elbows, Hands, Sacrum. Buttock, Coccyx, Ischium, Legs. Feet and Heels, and Under Medical Devices         Does the Patient have a Wound? No noted wound(s)       Willis Prevention initiated by RN: No  Wound Care Orders initiated by RN: No    Pressure Injury (Stage 3,4, Unstageable, DTI, NWPT, and Complex wounds) if present, place Wound referral order by RN under : No    New Ostomies, if present place, Ostomy referral order under : No     Nurse 1 eSignature: Electronically signed by Ita Bagley LPN on 3/26/24 at 3:18 PM EDT    **SHARE this note so that the co-signing nurse can place an eSignature**    Nurse 2 eSignature: Electronically signed by Luz Gomez RN on 3/26/24 at 3:21 PM EDT    
Clinical  for [facility]   
Outpatient Pharmacy Progress Note for Meds-to-Beds    Total number of Prescriptions Filled: 4  The following medications were dispensed to the patient during the discharge process:  aspirin  ondansetron  oxyCODONE-acetaminophen  tiZANidine    Additional Documentation:  Patient picked-up the medication(s) in the OP Pharmacy      Thank you for letting us serve your patients.  [place] [id number]    Phone: [phone]    Fax: [phone]        
Patient notified of surgery time at [facility] 3/26/2024 1000 arrival 0800, NPO instructions and DOS meds reviewed, understanding verbalized   
This RN spoke to Kandy HANKINS in OR 4, let her know there was not usual order set in for this pt.  
23=1-19%(CI), 20-22=20-39%(CJ), 15-19=40-59%(CK), 10-14=60-79%(CL), 7-9=80-99%(CM), 6=100%(CN)]     Treatment:  Self Care Training:   Cues were given for safety, sequence, UE/LE placement, visual cues, and balance.    Activities performed today included UB/LB dressing tasks, oral hygiene task of brushing teeth, grooming task of washing face, education on donning sling and one-handed dressing.     Safety Measures: Gait belt used, Left in Chair, Alarm in place    Assessment:  Pt is a 73 year old female with a past medical history of Anxiety state, Arthritis, Benign essential HTN, Breast lump, Bunion of great toe of right foot, CKD (chronic kidney disease), stage II, Colon polyp, Family history of early CAD, GERD (gastroesophageal reflux disease), History of nuclear stress test, Hyperlipidemia, Internal hemorrhoids, Irritable bowel syndrome, Nausea & vomiting, Osteoarthritis, PONV (postoperative nausea and vomiting), and Rosacea. Pt admitted for planned Rt shoulder total arthroplasty reverse on 3-26 to address complete Rt rotator cuff tear and chronic Rt shoulder pain. Pt lives at home with their  and at baseline is independent with ADLs, IADLs, mobility and driving. Pt performed well this date, and from a self care and mobility standpoint, is safe to return home with initial 24 hour supervision/assistance at discharge.    Complexity: Low  Prognosis: Good  Plan: Eval and discharge, no further acute OT needs    Time:   Time in: 852  Time out: 921  Timed treatment minutes: 14  Total time: 29    Electronically signed by:    RHONDA Carlson/OT    \"I, the qualified professional, was present and in the room for the entire session. The student participates in the delivery of services when the qualified practitioner is directing the service, making the skilled judgment, and is responsible for the assessment and treatment.\"     Shoaib Borrero OTR/L, MOT, [id number]

## 2024-03-28 ENCOUNTER — TELEPHONE (OUTPATIENT)
Dept: FAMILY MEDICINE CLINIC | Age: 74
End: 2024-03-28

## 2024-03-28 ENCOUNTER — CARE COORDINATION (OUTPATIENT)
Dept: CASE MANAGEMENT | Age: 74
End: 2024-03-28

## 2024-03-28 NOTE — TELEPHONE ENCOUNTER
Care Transitions Initial Follow Up Call    Outreach made within 2 business days of discharge: Yes    Patient: Madiha Martin Patient : 1950   MRN: [id number]  Reason for Admission: There are no discharge diagnoses documented for the most recent discharge.  Discharge Date: 3/27/24       Spoke with: pt    Discharge department/facility: [facility]    TCM Interactive Patient Contact:  Was patient able to fill all prescriptions: Yes  Was patient instructed to bring all medications to the follow-up visit: Yes  Is patient taking all medications as directed in the discharge summary? Yes  Does patient understand their discharge instructions: Yes  Does patient have questions or concerns that need addressed prior to 7-14 day follow up office visit: no    Scheduled appointment with PCP within 7-14 days    Follow Up  Future Appointments   Date Time Provider Department Center   2024 10:00 AM Bright Javier, PT [facility]   2024  9:00 AM Maximo Mary, DO [facility]   2024 10:15 AM Maximo Mary DO [facility]   2024 10:15 AM Maximo Mary DO [facility]   7/10/2024  9:00 AM Jake Dumont MD [facility]   2024  2:30 PM [facility]V LPN [facility]   2/10/2025  9:20 AM Lloyd Whitehead MD [facility]     Pt decline follow up with pcp - planned surgery recovering well.  Juliana Antoine LPN

## 2024-03-28 NOTE — CARE COORDINATION
Care Transitions Outreach Attempt    Call within 2 business days of discharge: Yes     Attempted to reach patient for transitions of care follow up. Unable to reach patient.    Patient: Madiha Martin Patient : 1950 MRN: [id number]    1st attempt to reach for Care Transition discharge call unsuccessful. HIPAA compliant message left requesting call back to [phone].     Last Discharge Facility       Date Complaint Diagnosis Description Type Department Provider    3/26/24  Right rotator cuff tear arthropathy Admission (Discharged) [facility] Maximo Mary, DO          Was this an external facility discharge? No Discharge Facility Name: [place]    Noted following upcoming appointments from discharge chart review:   [facility] follow up appointment(s):   Future Appointments   Date Time Provider Department Center   2024 10:00 AM Bright Javier PT [facility] PT [facility] [place]   2024  9:00 AM Maximo Mary DO [facility]   2024 10:15 AM Maximo Mary DO [facility]   2024 10:15 AM Maximo Mary DO [facility]   7/10/2024  9:00 AM Jake Dumont MD [facility]   2024  2:30 PM [facility] AWV LPN [facility]   2/10/2025  9:20 AM Lloyd Whitehead MD [facility]     [facility]  follow up appointment(s): JAYME Thayer RN CTN

## 2024-03-28 NOTE — ANESTHESIA POSTPROCEDURE EVALUATION
Department of Anesthesiology  Postprocedure Note    Patient: Madiha Martin  MRN: [id number]  YOB: 1950  Date of evaluation: 3/28/2024    Procedure Summary       Date: 03/26/24 Room / Location: [address]    Anesthesia Start: 1116 Anesthesia Stop: 1310    Procedures:       RIGHT SHOULDER TOTAL ARTHROPLASTY REVERSE (Right: Shoulder)      RIGHT BICEPS TENODESIS (Right) Diagnosis:       Complete tear of right rotator cuff, unspecified whether traumatic      Chronic right shoulder pain      (Complete tear of right rotator cuff, unspecified whether traumatic [M75.121])      (Chronic right shoulder pain [M25.511, G89.29])    Surgeons: Maximo Mary DO Responsible Provider: Star Tay MD    Anesthesia Type: general, regional ASA Status: 3            Anesthesia Type: No value filed.    Pawel Phase I: Pawel Score: 10    Pawel Phase II:      Anesthesia Post Evaluation    Patient location during evaluation: PACU  Patient participation: complete - patient participated  Level of consciousness: sleepy but conscious  Pain score: 1  Airway patency: patent  Nausea & Vomiting: no nausea and no vomiting  Cardiovascular status: hemodynamically stable  Respiratory status: acceptable  Hydration status: euvolemic  Pain management: adequate    No notable events documented.

## 2024-03-29 ENCOUNTER — CARE COORDINATION (OUTPATIENT)
Dept: CASE MANAGEMENT | Age: 74
End: 2024-03-29

## 2024-03-29 NOTE — CARE COORDINATION
Care Transitions Outreach Attempt    Call within 2 business days of discharge: Yes     Attempted to reach patient for transitions of care follow up. Unable to reach   patient.    Patient: Madiha Martin Patient : 1950 MRN: [id number]    2nd unsuccessful attempt to reach for Care Transition discharge call. HIPAA compliant message left requesting call back. Episode closed per protocol, no further outreach scheduled.      Unable to Reach letter sent via My Chart    Last Discharge Facility       Date Complaint Diagnosis Description Type Department Provider    3/26/24  Right rotator cuff tear arthropathy Admission (Discharged) [facility] Maximo Mary, DO          Was this an external facility discharge? No Discharge Facility Name: [place]    Noted following upcoming appointments from discharge chart review:   [facility] follow up appointment(s):   Future Appointments   Date Time Provider Department Center   2024 10:00 AM Bright Javier PT [facility] [place]   2024  9:00 AM Maximo Mary DO [facility]   2024 10:15 AM Maximo Mary DO [facility]   2024 10:15 AM Maximo Mary DO [facility]   7/10/2024  9:00 AM Jake Dumont MD [facility]   2024  2:30 PM [facility] AWV LPN [facility]   2/10/2025  9:20 AM Lloyd Whitehead MD [facility]     Non-BS  follow up appointment(s): JAYME Thayer RN CTN

## 2024-04-04 ENCOUNTER — TELEPHONE (OUTPATIENT)
Dept: ORTHOPEDIC SURGERY | Age: 74
End: 2024-04-04

## 2024-04-04 NOTE — TELEPHONE ENCOUNTER
Patient states she needs to know what exercises and PT she should do at home after surgery. States she did not see PT in hospital.     Patient had right TSA Rev and bicep tenodesis, DOS 3/26/2024.    Patient advised formal PT would not start yet and she could her home exercises and remain in the sling. Patient states she doesn't know what exercises to do .     Please advise which exercises, if any, the patient should do at home.

## 2024-04-11 ENCOUNTER — TELEPHONE (OUTPATIENT)
Dept: ORTHOPEDIC SURGERY | Age: 74
End: 2024-04-11

## 2024-04-11 NOTE — TELEPHONE ENCOUNTER
Patient called with concerns of having pain while doing her exercises. I advised the patient that she is going to have some discomfort in the shoulder while doing pt. Pt verbally understood

## 2024-04-14 ASSESSMENT — PROMIS GLOBAL HEALTH SCALE
WHO IS THE PERSON COMPLETING THE PROMIS V1.1 SURVEY?: SELF
IN GENERAL, WOULD YOU SAY YOUR HEALTH IS...[ON A SCALE OF 1 (POOR) TO 5 (EXCELLENT)]: VERY GOOD
IN GENERAL, PLEASE RATE HOW WELL YOU CARRY OUT YOUR USUAL SOCIAL ACTIVITIES (INCLUDES ACTIVITIES AT HOME, AT WORK, AND IN YOUR COMMUNITY, AND RESPONSIBILITIES AS A PARENT, CHILD, SPOUSE, EMPLOYEE, FRIEND, ETC) [ON A SCALE OF 1 (POOR) TO 5 (EXCELLENT)]?: VERY GOOD
SUM OF RESPONSES TO QUESTIONS 3, 6, 7, & 8: 14
IN THE PAST 7 DAYS, HOW OFTEN HAVE YOU BEEN BOTHERED BY EMOTIONAL PROBLEMS, SUCH AS FEELING ANXIOUS, DEPRESSED, OR IRRITABLE [ON A SCALE FROM 1 (NEVER) TO 5 (ALWAYS)]?: NEVER
SUM OF RESPONSES TO QUESTIONS 2, 4, 5, & 10: 18
IN THE PAST 7 DAYS, HOW WOULD YOU RATE YOUR PAIN ON AVERAGE [ON A SCALE FROM 0 (NO PAIN) TO 10 (WORST IMAGINABLE PAIN)]?: 3
IN THE PAST 7 DAYS, HOW WOULD YOU RATE YOUR PAIN ON AVERAGE [ON A SCALE FROM 0 (NO PAIN) TO 10 (WORST IMAGINABLE PAIN)]?: 3
IN THE PAST 7 DAYS, HOW WOULD YOU RATE YOUR FATIGUE ON AVERAGE [ON A SCALE FROM 1 (NONE) TO 5 (VERY SEVERE)]?: MODERATE
HOW IS THE PROMIS V1.1 BEING ADMINISTERED?: ELECTRONIC
HOW IS THE PROMIS V1.1 BEING ADMINISTERED?: ELECTRONIC
IN THE PAST 7 DAYS, HOW OFTEN HAVE YOU BEEN BOTHERED BY EMOTIONAL PROBLEMS, SUCH AS FEELING ANXIOUS, DEPRESSED, OR IRRITABLE [ON A SCALE FROM 1 (NEVER) TO 5 (ALWAYS)]?: NEVER
IN GENERAL, HOW WOULD YOU RATE YOUR PHYSICAL HEALTH [ON A SCALE OF 1 (POOR) TO 5 (EXCELLENT)]?: GOOD
IN GENERAL, HOW WOULD YOU RATE YOUR SATISFACTION WITH YOUR SOCIAL ACTIVITIES AND RELATIONSHIPS [ON A SCALE OF 1 (POOR) TO 5 (EXCELLENT)]?: EXCELLENT
TO WHAT EXTENT ARE YOU ABLE TO CARRY OUT YOUR EVERYDAY PHYSICAL ACTIVITIES SUCH AS WALKING, CLIMBING STAIRS, CARRYING GROCERIES, OR MOVING A CHAIR [ON A SCALE OF 1 (NOT AT ALL) TO 5 (COMPLETELY)]?: COMPLETELY
IN GENERAL, HOW WOULD YOU RATE YOUR SATISFACTION WITH YOUR SOCIAL ACTIVITIES AND RELATIONSHIPS [ON A SCALE OF 1 (POOR) TO 5 (EXCELLENT)]?: EXCELLENT
WHO IS THE PERSON COMPLETING THE PROMIS V1.1 SURVEY?: SELF
IN THE PAST 7 DAYS, HOW WOULD YOU RATE YOUR FATIGUE ON AVERAGE [ON A SCALE FROM 1 (NONE) TO 5 (VERY SEVERE)]?: MODERATE
TO WHAT EXTENT ARE YOU ABLE TO CARRY OUT YOUR EVERYDAY PHYSICAL ACTIVITIES SUCH AS WALKING, CLIMBING STAIRS, CARRYING GROCERIES, OR MOVING A CHAIR [ON A SCALE OF 1 (NOT AT ALL) TO 5 (COMPLETELY)]?: COMPLETELY
IN GENERAL, HOW WOULD YOU RATE YOUR MENTAL HEALTH, INCLUDING YOUR MOOD AND YOUR ABILITY TO THINK [ON A SCALE OF 1 (POOR) TO 5 (EXCELLENT)]?: VERY GOOD
IN GENERAL, WOULD YOU SAY YOUR QUALITY OF LIFE IS...[ON A SCALE OF 1 (POOR) TO 5 (EXCELLENT)]: VERY GOOD
IN GENERAL, PLEASE RATE HOW WELL YOU CARRY OUT YOUR USUAL SOCIAL ACTIVITIES (INCLUDES ACTIVITIES AT HOME, AT WORK, AND IN YOUR COMMUNITY, AND RESPONSIBILITIES AS A PARENT, CHILD, SPOUSE, EMPLOYEE, FRIEND, ETC) [ON A SCALE OF 1 (POOR) TO 5 (EXCELLENT)]?: VERY GOOD

## 2024-04-16 ENCOUNTER — HOSPITAL ENCOUNTER (OUTPATIENT)
Dept: PHYSICAL THERAPY | Age: 74
Setting detail: THERAPIES SERIES
Discharge: HOME | End: 2024-04-16
Payer: MEDICARE

## 2024-04-16 PROCEDURE — 97110 THERAPEUTIC EXERCISES: CPT

## 2024-04-16 PROCEDURE — 97162 PT EVAL MOD COMPLEX 30 MIN: CPT

## 2024-04-16 ASSESSMENT — PAIN DESCRIPTION - ORIENTATION: ORIENTATION: RIGHT;ANTERIOR;OUTER

## 2024-04-16 ASSESSMENT — PAIN DESCRIPTION - LOCATION: LOCATION: SHOULDER

## 2024-04-16 ASSESSMENT — PAIN DESCRIPTION - PAIN TYPE: TYPE: SURGICAL PAIN

## 2024-04-16 ASSESSMENT — PAIN SCALES - GENERAL: PAINLEVEL_OUTOF10: 1

## 2024-04-16 ASSESSMENT — PAIN DESCRIPTION - DESCRIPTORS: DESCRIPTORS: ACHING

## 2024-04-16 NOTE — FLOWSHEET NOTE
Outpatient Physical Therapy  [place]           [x] Phone: [phone]   Fax: [phone]  [place]           [] Phone: [phone]   Fax: [phone]        Physical Therapy Daily Treatment Note  Date:  2024    Patient Name:  Madiha Martin    :  1950  MRN: [id number]  Restrictions/Precautions:   Sling for 6 weeks, no active resistance to bicep till 6 weeks. ER to 30 deg, scaption to 120 deg till 6 weeks.         Diagnosis:   Nontraumatic complete tear of right rotator cuff [M75.121]    s/p R Reverse TSA   Chronic pain in right shoulder [M25.511, G89.29]    Date of Injury/Surgery: 3/24/24    Treatment Diagnosis:  R UE stiffness, pain, weakness  Insurance/Certification information:  [employer]  Pre-cert   Referring Physician:  Maximo Mary DO     PCP: Jake Dumont MD  Next Doctor Visit:    Plan of care signed (Y/N):  N, sent 24    Outcome Measure: Quick DASH: 86% disability   Visit# / total visits:  1 /  Pain level: 1/10   Goals:     Patient goals: full use to return to PLOF  Short term goals  Time Frame for Short term goals: Defer to Long Term Goals                   Long Term Goals Completed by 8 weeks 24       Long Term Goals: 6 weeks   Long Term Goal 1: Pt will demo I with HEP/symptom management.   Long Term Goal 2: Pt will demo able to reach overhead with no increase in pain to ease overhead reaching.  Long Term Goal 3: Pt will demo >20 deg improvement in R UE A/PROM to ADLs.   Long Term Goal 4: Pt will demo able to reach behind head to complete hair management with min/no increase in pain.   Long Term Goal 5: Pt will demo <40% disability per quick DASH to demo improved UE mobility.   Long Term Goal 6: Pt will demo >4/5 R UE strength within available motion to ease light lifting.          Summary of Evaluation:   Pt is 73 year old female s/p R shoulder Reverse TSA with bicep tenodesis 3/26/24. Pt now has difficulties completing use of dominant R UE for

## 2024-04-17 ENCOUNTER — OFFICE VISIT (OUTPATIENT)
Dept: ORTHOPEDIC SURGERY | Age: 74
End: 2024-04-17

## 2024-04-17 VITALS — SYSTOLIC BLOOD PRESSURE: 122 MMHG | DIASTOLIC BLOOD PRESSURE: 82 MMHG | HEART RATE: 86 BPM | OXYGEN SATURATION: 98 %

## 2024-04-17 DIAGNOSIS — Z09 POSTOP CHECK: Primary | ICD-10-CM

## 2024-04-17 PROCEDURE — 99024 POSTOP FOLLOW-UP VISIT: CPT | Performed by: STUDENT IN AN ORGANIZED HEALTH CARE EDUCATION/TRAINING PROGRAM

## 2024-04-17 NOTE — PROGRESS NOTES
Patient is here for 3 week post op check on right reverse total shoulder arthroplasty with biceps tenodesis.     She denies new injury. Has been wearing sling and taking ASA as directed. Pain well managed with OTC tylenol, advised to keep pain medication for physical therapy. Has been icing. Had physical therapy evaluation which went well. Starting PT tomorrow.   
restrictions as there is no subscapularis repair  -rest/elevation as needed  -DVT prophylaxis: Continue ASA until 6-week postop appointment  -No refills needed per patient  -f/u in 4 week(s)  -f/u sooner prn any issues

## 2024-04-17 NOTE — PATIENT INSTRUCTIONS
Follow up in 4 weeks for 6 week post op check.   Continue taking Aspirin until next appointment.  Let us know if you need pain medication for physical therapy.  Call if you have any concerns.

## 2024-04-17 NOTE — PRE-CERTIFICATION NOTE
Pt approved for 10 visits includes day of eval    [id number]  only    04/16/24-6/15/24

## 2024-04-18 ENCOUNTER — HOSPITAL ENCOUNTER (OUTPATIENT)
Dept: PHYSICAL THERAPY | Age: 74
Setting detail: THERAPIES SERIES
Discharge: HOME | End: 2024-04-18
Payer: MEDICARE

## 2024-04-18 PROCEDURE — 97140 MANUAL THERAPY 1/> REGIONS: CPT

## 2024-04-18 PROCEDURE — 97110 THERAPEUTIC EXERCISES: CPT

## 2024-04-18 PROCEDURE — 97016 VASOPNEUMATIC DEVICE THERAPY: CPT

## 2024-04-18 NOTE — FLOWSHEET NOTE
Outpatient Physical Therapy  [place]           [x] Phone: [phone]   Fax: [phone]  [place]           [] Phone: [phone]   Fax: [phone]        Physical Therapy Daily Treatment Note  Date:  2024    Patient Name:  Madiha Martin    :  1950  MRN: [id number]  Restrictions/Precautions:   Sling for 6 weeks, no active resistance to bicep till 6 weeks. ER to 30 deg, scaption to 120 deg till 6 weeks.         Diagnosis:   Nontraumatic complete tear of right rotator cuff [M75.121]    s/p R Reverse TSA   Chronic pain in right shoulder [M25.511, G89.29]    Date of Injury/Surgery: 3/24/24    Treatment Diagnosis:  R UE stiffness, pain, weakness  Insurance/Certification information:  [employer]  Pre-cert   Referring Physician:  Maximo Mary DO     PCP: Jake Dumont MD  Next Doctor Visit:    Plan of care signed (Y/N):  Pt approved for 10 visits includes day of eval  [id number]  only  Outcome Measure: Quick DASH: 86% disability   Visit# / total visits:  2 / 10  Pain level: 4-5/10   Goals:     Patient goals: full use to return to PLOF  Short term goals  Time Frame for Short term goals: Defer to Long Term Goals                   Long Term Goals Completed by 8 weeks 24       Long Term Goals: 6 weeks   Long Term Goal 1: Pt will demo I with HEP/symptom management.   Long Term Goal 2: Pt will demo able to reach overhead with no increase in pain to ease overhead reaching.  Long Term Goal 3: Pt will demo >20 deg improvement in R UE A/PROM to ADLs.   Long Term Goal 4: Pt will demo able to reach behind head to complete hair management with min/no increase in pain.   Long Term Goal 5: Pt will demo <40% disability per quick DASH to demo improved UE mobility.   Long Term Goal 6: Pt will demo >4/5 R UE strength within available motion to ease light lifting.          Summary of Evaluation:   Pt is 73 year old female s/p R shoulder Reverse TSA with bicep tenodesis

## 2024-04-18 NOTE — PLAN OF CARE
[facility]  [facility] [place] [facility]  [address]  [facility] [address]  Dept: [phone]  Dept Fax: [phone]  Loc: [phone]    PHYSICAL THERAPY PLAN OF CARE: INITIAL EVALUATION    Patient: Madiha Martin (73 y.o. female)   Examination Date: 2024  Plan of Care Certification Period: 2024 to        :  1950  MRN: [id number]  CSN: [id number]   Insurance: Payor: [employer] MEDICARE / Plan: [employer] CHOICE-PPO MEDICARE / Product Type: *No Product type* /   Insurance ID: [id number] - (Medicare Managed) Secondary Insurance (if applicable):    Referring Physician: aMximo Mary DO     PCP: Jake Dumont MD Visits to Date/Visits Approved:   /      No Show/Cancelled Appts:   /       Medical Diagnosis: Nontraumatic complete tear of right rotator cuff [M75.121]  Chronic pain in right shoulder [M25.511, G89.29]    Treatment Diagnosis: R UE stiffness, pain, weakness       ASSESSMENT     Impression:  Pt is 73 year old female s/p R shoulder Reverse TSA with bicep tenodesis 3/26/24. Pt now has difficulties completing use of dominant R UE for ADLs, sleeping, household cleaning/cooking and self management. Pt demo deficits this date that include expected deficit in R UE A/PROM, poor strength and min-mod pain. Pt will benefit with PT services with progression of strength/ROM, manual and modalities per protocol to return to OF. Pt prior to onset of current condition had no pain with able to complete full ADLs and household activities. Patient received education on their current pathology and how their condition effects them with their functional activities. Patient understood discussion and questions were answered. Patient understands their activity limitations and understands rational for treatment progression.      Body Structures, Functions, Activity Limitations Requiring Skilled Therapeutic Intervention: Decreased functional mobility , Decreased ROM,

## 2024-04-22 ENCOUNTER — HOSPITAL ENCOUNTER (OUTPATIENT)
Dept: PHYSICAL THERAPY | Age: 74
Setting detail: THERAPIES SERIES
Discharge: HOME | End: 2024-04-22
Payer: MEDICARE

## 2024-04-22 PROCEDURE — 97016 VASOPNEUMATIC DEVICE THERAPY: CPT

## 2024-04-22 PROCEDURE — 97140 MANUAL THERAPY 1/> REGIONS: CPT

## 2024-04-22 PROCEDURE — 97110 THERAPEUTIC EXERCISES: CPT

## 2024-04-22 NOTE — FLOWSHEET NOTE
Outpatient Physical Therapy  [place]           [x] Phone: [phone]   Fax: [phone]  [place]           [] Phone: [phone]   Fax: [phone]        Physical Therapy Daily Treatment Note  Date:  2024    Patient Name:  Madiha Martin    :  1950  MRN: [id number]  Restrictions/Precautions:   Sling for 6 weeks, no active resistance to bicep till 6 weeks. ER to 30 deg, scaption to 120 deg till 6 weeks.         Diagnosis:   Nontraumatic complete tear of right rotator cuff [M75.121]    s/p R Reverse TSA   Chronic pain in right shoulder [M25.511, G89.29]    Date of Injury/Surgery: 3/24/24    Treatment Diagnosis:  R UE stiffness, pain, weakness  Insurance/Certification information:  [employer]  Pre-cert   Referring Physician:  Maximo Mary DO     PCP: Jake Dumont MD  Next Doctor Visit:    Plan of care signed (Y/N):  Pt approved for 10 visits includes day of eval  [id number]  only  Outcome Measure: Quick DASH: 86% disability   Visit# / total visits:  3 / 10  Pain level: 1/10   Goals:     Patient goals: full use to return to PLOF  Short term goals  Time Frame for Short term goals: Defer to Long Term Goals                   Long Term Goals Completed by 8 weeks 24       Long Term Goals: 6 weeks   Long Term Goal 1: Pt will demo I with HEP/symptom management.   Long Term Goal 2: Pt will demo able to reach overhead with no increase in pain to ease overhead reaching.  Long Term Goal 3: Pt will demo >20 deg improvement in R UE A/PROM to ADLs.   Long Term Goal 4: Pt will demo able to reach behind head to complete hair management with min/no increase in pain.   Long Term Goal 5: Pt will demo <40% disability per quick DASH to demo improved UE mobility.   Long Term Goal 6: Pt will demo >4/5 R UE strength within available motion to ease light lifting.          Summary of Evaluation:   Pt is 73 year old female s/p R shoulder Reverse TSA with bicep tenodesis

## 2024-04-24 ENCOUNTER — HOSPITAL ENCOUNTER (OUTPATIENT)
Dept: PHYSICAL THERAPY | Age: 74
Setting detail: THERAPIES SERIES
Discharge: HOME | End: 2024-04-24
Payer: MEDICARE

## 2024-04-24 PROCEDURE — 97110 THERAPEUTIC EXERCISES: CPT

## 2024-04-24 PROCEDURE — 97140 MANUAL THERAPY 1/> REGIONS: CPT

## 2024-04-24 PROCEDURE — 97016 VASOPNEUMATIC DEVICE THERAPY: CPT

## 2024-04-24 NOTE — FLOWSHEET NOTE
Outpatient Physical Therapy  [place]           [x] Phone: [phone]   Fax: [phone]  [place]           [] Phone: [phone]   Fax: [phone]        Physical Therapy Daily Treatment Note  Date:  2024    Patient Name:  Madiha Martin    :  1950  MRN: [id number]  Restrictions/Precautions:   Sling for 6 weeks, no active resistance to bicep till 6 weeks. ER to 30 deg, scaption to 120 deg till 6 weeks.         Diagnosis:   Nontraumatic complete tear of right rotator cuff [M75.121]    s/p R Reverse TSA   Chronic pain in right shoulder [M25.511, G89.29]    Date of Injury/Surgery: 3/24/24    Treatment Diagnosis:  R UE stiffness, pain, weakness  Insurance/Certification information:  [employer] Medicare  Pre-cert       Pt approved for 10 visits includes day of eval     [id number]  only     24-6/15/24            Referring Physician:  Maximo Mary DO     PCP: Jake Dumont MD  Next Doctor Visit:    Plan of care signed (Y/N):  Yes  Outcome Measure: Quick DASH: 86% disability   Visit# / total visits:  4 / 10  Pain level: 0/10 At rest in sling  Goals:     Patient goals: full use to return to PLOF  Short term goals  Time Frame for Short term goals: Defer to Long Term Goals                   Long Term Goals Completed by 8 weeks 24       Long Term Goals: 6 weeks   Long Term Goal 1: Pt will demo I with HEP/symptom management.   Long Term Goal 2: Pt will demo able to reach overhead with no increase in pain to ease overhead reaching.  Long Term Goal 3: Pt will demo >20 deg improvement in R UE A/PROM to ADLs.   Long Term Goal 4: Pt will demo able to reach behind head to complete hair management with min/no increase in pain.   Long Term Goal 5: Pt will demo <40% disability per quick DASH to demo improved UE mobility.   Long Term Goal 6: Pt will demo >4/5 R UE strength within available motion to ease light lifting.          Summary of Evaluation:   Pt is 73 year 
Detail Level: Detailed
Introduction Text (Please End With A Colon): The following procedure was deferred:

## 2024-04-29 ENCOUNTER — HOSPITAL ENCOUNTER (OUTPATIENT)
Dept: PHYSICAL THERAPY | Age: 74
Setting detail: THERAPIES SERIES
Discharge: HOME | End: 2024-04-29
Payer: MEDICARE

## 2024-04-29 PROCEDURE — 97140 MANUAL THERAPY 1/> REGIONS: CPT

## 2024-04-29 PROCEDURE — 97016 VASOPNEUMATIC DEVICE THERAPY: CPT

## 2024-04-29 PROCEDURE — 97110 THERAPEUTIC EXERCISES: CPT

## 2024-04-29 NOTE — FLOWSHEET NOTE
Outpatient Physical Therapy  [place]           [x] Phone: [phone]   Fax: [phone]  [place]           [] Phone: [phone]   Fax: [phone]        Physical Therapy Daily Treatment Note  Date:  2024    Patient Name:  Madiha Martin    :  1950  MRN: [id number]  Restrictions/Precautions:   Sling for 6 weeks, no active resistance to bicep till 6 weeks. ER to 30 deg, scaption to 120 deg till 6 weeks.         Diagnosis:   Nontraumatic complete tear of right rotator cuff [M75.121]    s/p R Reverse TSA   Chronic pain in right shoulder [M25.511, G89.29]    Date of Injury/Surgery: 3/24/24    Treatment Diagnosis:  R UE stiffness, pain, weakness  Insurance/Certification information:  [employer]  Pre-cert       Pt approved for 10 visits includes day of eval     [id number] [phone]  only     24-6/15/24            Referring Physician:  Maximo Mary DO     PCP: Jake Dumont MD  Next Doctor Visit:    Plan of care signed (Y/N):  Yes  Outcome Measure: Quick DASH: 86% disability   Visit# / total visits:  5 / 10  Pain level: 2/10   Goals:     Patient goals: full use to return to PLOF  Short term goals  Time Frame for Short term goals: Defer to Long Term Goals                   Long Term Goals Completed by 8 weeks 24       Long Term Goals: 6 weeks   Long Term Goal 1: Pt will demo I with HEP/symptom management.   Long Term Goal 2: Pt will demo able to reach overhead with no increase in pain to ease overhead reaching.  Long Term Goal 3: Pt will demo >20 deg improvement in R UE A/PROM to ADLs.   Long Term Goal 4: Pt will demo able to reach behind head to complete hair management with min/no increase in pain.   Long Term Goal 5: Pt will demo <40% disability per quick DASH to demo improved UE mobility.   Long Term Goal 6: Pt will demo >4/5 R UE strength within available motion to ease light lifting.          Summary of Evaluation:   Pt is 73 year old female s/p

## 2024-05-01 ENCOUNTER — HOSPITAL ENCOUNTER (OUTPATIENT)
Dept: PHYSICAL THERAPY | Age: 74
Setting detail: THERAPIES SERIES
Discharge: HOME | End: 2024-05-01
Payer: MEDICARE

## 2024-05-01 PROCEDURE — 97016 VASOPNEUMATIC DEVICE THERAPY: CPT

## 2024-05-01 PROCEDURE — 97140 MANUAL THERAPY 1/> REGIONS: CPT

## 2024-05-01 PROCEDURE — 97110 THERAPEUTIC EXERCISES: CPT

## 2024-05-01 NOTE — FLOWSHEET NOTE
of strength/ROM, manual and modalities per protocol to return to PLOF. Pt prior to onset of current condition had no pain with able to complete full ADLs and household activities. Patient received education on their current pathology and how their condition effects them with their functional activities. Patient understood discussion and questions were answered. Patient understands their activity limitations and understands rational for treatment progression.        Plan for Next Session:   Specific Instructions for Next Treatment: Follow protocol, PROM into scaption to 120, ER to 30 deg in scaption, sub max iso, elbow AROM without resistance, scap activities. Modalities PRN    Time In / Time Out: 1115/1200        Timed Code/Total Treatment Minutes: 35/45  (1) TE, (1) MT, (1) vaso      Next Progress Note due: Penelope 4/16/24   Visit 10        Plan of Care Interventions:  [x] Therapeutic Exercise  [x] Modalities:  [x] Therapeutic Activity     [] Ultrasound  [] Estim  [] Gait Training      [] Cervical Traction [] Lumbar Traction  [x] Neuromuscular Re-education    [x] Cold/hotpack [] Iontophoresis   [x] Instruction in HEP      [x] Vasopneumatic   [] Dry Needling    [x] Manual Therapy               [] Aquatic Therapy              Electronically signed by:  Rekha Drew PTA,  5/1/2024, 11:15 AM     5/1/2024,11:15 AM

## 2024-05-05 ASSESSMENT — PROMIS GLOBAL HEALTH SCALE
IN GENERAL, HOW WOULD YOU RATE YOUR SATISFACTION WITH YOUR SOCIAL ACTIVITIES AND RELATIONSHIPS [ON A SCALE OF 1 (POOR) TO 5 (EXCELLENT)]?: VERY GOOD
IN GENERAL, WOULD YOU SAY YOUR HEALTH IS...[ON A SCALE OF 1 (POOR) TO 5 (EXCELLENT)]: GOOD
IN THE PAST 7 DAYS, HOW WOULD YOU RATE YOUR PAIN ON AVERAGE [ON A SCALE FROM 0 (NO PAIN) TO 10 (WORST IMAGINABLE PAIN)]?: 2
IN THE PAST 7 DAYS, HOW WOULD YOU RATE YOUR FATIGUE ON AVERAGE [ON A SCALE FROM 1 (NONE) TO 5 (VERY SEVERE)]?: MILD
IN THE PAST 7 DAYS, HOW WOULD YOU RATE YOUR FATIGUE ON AVERAGE [ON A SCALE FROM 1 (NONE) TO 5 (VERY SEVERE)]?: MILD
TO WHAT EXTENT ARE YOU ABLE TO CARRY OUT YOUR EVERYDAY PHYSICAL ACTIVITIES SUCH AS WALKING, CLIMBING STAIRS, CARRYING GROCERIES, OR MOVING A CHAIR [ON A SCALE OF 1 (NOT AT ALL) TO 5 (COMPLETELY)]?: MOSTLY
HOW IS THE PROMIS V1.1 BEING ADMINISTERED?: ELECTRONIC
TO WHAT EXTENT ARE YOU ABLE TO CARRY OUT YOUR EVERYDAY PHYSICAL ACTIVITIES SUCH AS WALKING, CLIMBING STAIRS, CARRYING GROCERIES, OR MOVING A CHAIR [ON A SCALE OF 1 (NOT AT ALL) TO 5 (COMPLETELY)]?: MOSTLY
WHO IS THE PERSON COMPLETING THE PROMIS V1.1 SURVEY?: SELF
WHO IS THE PERSON COMPLETING THE PROMIS V1.1 SURVEY?: SELF
IN THE PAST 7 DAYS, HOW WOULD YOU RATE YOUR PAIN ON AVERAGE [ON A SCALE FROM 0 (NO PAIN) TO 10 (WORST IMAGINABLE PAIN)]?: 2
SUM OF RESPONSES TO QUESTIONS 3, 6, 7, & 8: 13
IN GENERAL, HOW WOULD YOU RATE YOUR MENTAL HEALTH, INCLUDING YOUR MOOD AND YOUR ABILITY TO THINK [ON A SCALE OF 1 (POOR) TO 5 (EXCELLENT)]?: GOOD
IN THE PAST 7 DAYS, HOW OFTEN HAVE YOU BEEN BOTHERED BY EMOTIONAL PROBLEMS, SUCH AS FEELING ANXIOUS, DEPRESSED, OR IRRITABLE [ON A SCALE FROM 1 (NEVER) TO 5 (ALWAYS)]?: NEVER
IN GENERAL, PLEASE RATE HOW WELL YOU CARRY OUT YOUR USUAL SOCIAL ACTIVITIES (INCLUDES ACTIVITIES AT HOME, AT WORK, AND IN YOUR COMMUNITY, AND RESPONSIBILITIES AS A PARENT, CHILD, SPOUSE, EMPLOYEE, FRIEND, ETC) [ON A SCALE OF 1 (POOR) TO 5 (EXCELLENT)]?: VERY GOOD
HOW IS THE PROMIS V1.1 BEING ADMINISTERED?: ELECTRONIC
IN GENERAL, HOW WOULD YOU RATE YOUR PHYSICAL HEALTH [ON A SCALE OF 1 (POOR) TO 5 (EXCELLENT)]?: GOOD
IN THE PAST 7 DAYS, HOW OFTEN HAVE YOU BEEN BOTHERED BY EMOTIONAL PROBLEMS, SUCH AS FEELING ANXIOUS, DEPRESSED, OR IRRITABLE [ON A SCALE FROM 1 (NEVER) TO 5 (ALWAYS)]?: NEVER
IN GENERAL, WOULD YOU SAY YOUR QUALITY OF LIFE IS...[ON A SCALE OF 1 (POOR) TO 5 (EXCELLENT)]: VERY GOOD
IN GENERAL, HOW WOULD YOU RATE YOUR SATISFACTION WITH YOUR SOCIAL ACTIVITIES AND RELATIONSHIPS [ON A SCALE OF 1 (POOR) TO 5 (EXCELLENT)]?: VERY GOOD
SUM OF RESPONSES TO QUESTIONS 2, 4, 5, & 10: 16
IN GENERAL, PLEASE RATE HOW WELL YOU CARRY OUT YOUR USUAL SOCIAL ACTIVITIES (INCLUDES ACTIVITIES AT HOME, AT WORK, AND IN YOUR COMMUNITY, AND RESPONSIBILITIES AS A PARENT, CHILD, SPOUSE, EMPLOYEE, FRIEND, ETC) [ON A SCALE OF 1 (POOR) TO 5 (EXCELLENT)]?: VERY GOOD

## 2024-05-07 ENCOUNTER — HOSPITAL ENCOUNTER (OUTPATIENT)
Dept: PHYSICAL THERAPY | Age: 74
Setting detail: THERAPIES SERIES
Discharge: HOME | End: 2024-05-07
Payer: MEDICARE

## 2024-05-07 PROCEDURE — 97110 THERAPEUTIC EXERCISES: CPT

## 2024-05-07 PROCEDURE — 97016 VASOPNEUMATIC DEVICE THERAPY: CPT

## 2024-05-07 PROCEDURE — 97140 MANUAL THERAPY 1/> REGIONS: CPT

## 2024-05-07 NOTE — FLOWSHEET NOTE
MODALITIES      Vaso (Billable CPT code)  10' 10' 10'             Other Therapeutic Activities/Education:  Patient received education on their current pathology and how their condition effects them with their functional activities. Patient understood discussion and questions were answered. Patient understands their activity limitations and understands rational for treatment progression.       Home Exercise Program:  *HO issued, reviewed and discussed with patient. All questions answered. Pt agreed to comply.        Manual Treatments:  PROM shld and elbow, STM upper arm    Modalities: .Patient received vasocompression on their R shoulder for pain and inflammation for  10 min on low pressure. Patient had negative skin reaction afterwards.     Did not measure ROM after vaso so shoulder would not be irritated afterwards.   See subjective and assessment for pre and post treatment patient reported pain levels.        Communication with other providers:  POC sent 4/16/24         Assessment:  (Response towards treatment session) (Pain Rating) Pt demonstrated overall fair tolerance to today's session without adverse reaction. Will continue with therapy progressing as tolerated and per protocol. Has 6 week post op visit with surgeon tomorrow.  End pain 2/10 following vaso           Pt is 73 year old female s/p R shoulder Reverse TSA with bicep tenodesis 3/26/24. Pt now has difficulties completing use of dominant R UE for ADLs, sleeping, household cleaning/cooking and self management. Pt demo deficits this date that include expected deficit in R UE A/PROM, poor strength and min-mod pain. Pt will benefit with PT services with progression of strength/ROM, manual and modalities per protocol to return to PLOF. Pt prior to onset of current condition had no pain with able to complete full ADLs and household activities. Patient received education on their current pathology and how their condition effects them with their functional

## 2024-05-08 ENCOUNTER — OFFICE VISIT (OUTPATIENT)
Dept: ORTHOPEDIC SURGERY | Age: 74
End: 2024-05-08

## 2024-05-08 VITALS — SYSTOLIC BLOOD PRESSURE: 158 MMHG | OXYGEN SATURATION: 98 % | DIASTOLIC BLOOD PRESSURE: 88 MMHG | HEART RATE: 74 BPM

## 2024-05-08 DIAGNOSIS — Z09 POSTOPERATIVE EXAMINATION: Primary | ICD-10-CM

## 2024-05-08 PROCEDURE — 99024 POSTOP FOLLOW-UP VISIT: CPT | Performed by: STUDENT IN AN ORGANIZED HEALTH CARE EDUCATION/TRAINING PROGRAM

## 2024-05-08 NOTE — PROGRESS NOTES
6 week post op Right reverse TSA    Denies new injury. PT going well. Pain 2/10. No complaints.   
well-positioned and reduced.  No concerning findings related to screw or prosthesis.  No soft tissue concerns or bony abnormalities including periprosthetic fracture.     Impression: Status post above, doing well        Plan:   -Imaging reviewed with patient I offered reassurance that there is no concerning findings on imaging or examination today  -Patient to continue out of sling and to continue with physical therapy.  Her goal is to build to reach the top of her head by her follow-up appointment in 6 weeks  -continue the PT protocol   -No range of motion restrictions as there is no subscapularis repair  -rest/elevation as needed  -DVT prophylaxis: Can discontinue  mg  -No refills needed per patient.  She will call if she decides she needs additional pain medication or  -f/u in 6 week(s)  -f/u sooner prn any issues

## 2024-05-09 ENCOUNTER — HOSPITAL ENCOUNTER (OUTPATIENT)
Dept: PHYSICAL THERAPY | Age: 74
Setting detail: THERAPIES SERIES
Discharge: HOME | End: 2024-05-09
Payer: MEDICARE

## 2024-05-09 PROCEDURE — 97110 THERAPEUTIC EXERCISES: CPT

## 2024-05-09 PROCEDURE — 97140 MANUAL THERAPY 1/> REGIONS: CPT

## 2024-05-09 PROCEDURE — 97016 VASOPNEUMATIC DEVICE THERAPY: CPT

## 2024-05-09 NOTE — FLOWSHEET NOTE
Outpatient Physical Therapy  [place]           [x] Phone: [phone]   Fax: [phone]  [place]           [] Phone: [phone]   Fax: [phone]        Physical Therapy Daily Treatment Note  Date:  2024    Patient Name:  Madiha Martin    :  1950  MRN: [id number]  Restrictions/Precautions:   Sling for 6 weeks, no active resistance to bicep till 6 weeks. ER to 30 deg, scaption to 120 deg till 6 weeks.         Diagnosis:   Nontraumatic complete tear of right rotator cuff [M75.121]    s/p R Reverse TSA   Chronic pain in right shoulder [M25.511, G89.29]    Date of Injury/Surgery: 3/24/24    Treatment Diagnosis:  R UE stiffness, pain, weakness  Insurance/Certification information:  [employer]  Pre-cert       Pt approved for 10 visits includes day of eval     [id number] [id number]  only     24-6/15/24            Referring Physician:  Maximo Mary DO     PCP: Jake Dumont MD  Next Doctor Visit:    Plan of care signed (Y/N):  Yes  Outcome Measure: Quick DASH: 86% disability   Visit# / total visits:  8/ 10  Pain level: 2 /10   Goals:     Patient goals: full use to return to PLOF  Short term goals  Time Frame for Short term goals: Defer to Long Term Goals                   Long Term Goals Completed by 8 weeks 24       Long Term Goals: 6 weeks   Long Term Goal 1: Pt will demo I with HEP/symptom management.   Long Term Goal 2: Pt will demo able to reach overhead with no increase in pain to ease overhead reaching.  Long Term Goal 3: Pt will demo >20 deg improvement in R UE A/PROM to ADLs.   Long Term Goal 4: Pt will demo able to reach behind head to complete hair management with min/no increase in pain.   Long Term Goal 5: Pt will demo <40% disability per quick DASH to demo improved UE mobility.   Long Term Goal 6: Pt will demo >4/5 R UE strength within available motion to ease light lifting.          Summary of Evaluation:   Pt is 73 year old female s/p R

## 2024-05-15 ENCOUNTER — HOSPITAL ENCOUNTER (OUTPATIENT)
Dept: PHYSICAL THERAPY | Age: 74
Setting detail: THERAPIES SERIES
Discharge: HOME | End: 2024-05-15
Payer: MEDICARE

## 2024-05-15 PROCEDURE — 97140 MANUAL THERAPY 1/> REGIONS: CPT

## 2024-05-15 PROCEDURE — 97016 VASOPNEUMATIC DEVICE THERAPY: CPT

## 2024-05-15 PROCEDURE — 97110 THERAPEUTIC EXERCISES: CPT

## 2024-05-15 NOTE — FLOWSHEET NOTE
activities. Patient received education on their current pathology and how their condition effects them with their functional activities. Patient understood discussion and questions were answered. Patient understands their activity limitations and understands rational for treatment progression.        Plan for Next Session:   Specific Instructions for Next Treatment: Follow protocol, PROM into scaption to 120, ER to 30 deg in scaption, sub max iso, elbow AROM without resistance, scap activities. Modalities PRN    Time In / Time Out: 1348/1438        Timed Code/Total Treatment Minutes: 40/50, (2) TE, (1) MT, (1) vaso      Next Progress Note due: Penelope 4/16/24   Visit 10        Plan of Care Interventions:  [x] Therapeutic Exercise  [x] Modalities:  [x] Therapeutic Activity     [] Ultrasound  [] Estim  [] Gait Training      [] Cervical Traction [] Lumbar Traction  [x] Neuromuscular Re-education    [x] Cold/hotpack [] Iontophoresis   [x] Instruction in HEP      [x] Vasopneumatic   [] Dry Needling    [x] Manual Therapy               [] Aquatic Therapy              Electronically signed by:  Rekha Drew PTA    5/15/2024 1:48 PM

## 2024-05-20 ENCOUNTER — TELEPHONE (OUTPATIENT)
Dept: ORTHOPEDIC SURGERY | Age: 74
End: 2024-05-20

## 2024-05-20 DIAGNOSIS — Z09 POSTOPERATIVE EXAMINATION: Primary | ICD-10-CM

## 2024-05-20 RX ORDER — HYDROCODONE BITARTRATE AND ACETAMINOPHEN 5; 325 MG/1; MG/1
1 TABLET ORAL EVERY 6 HOURS PRN
Qty: 28 TABLET | Refills: 0 | Status: SHIPPED | OUTPATIENT
Start: 2024-05-20 | End: 2024-05-27

## 2024-05-20 RX ORDER — CYCLOBENZAPRINE HCL 5 MG
5 TABLET ORAL 2 TIMES DAILY PRN
Qty: 30 TABLET | Refills: 0 | Status: SHIPPED | OUTPATIENT
Start: 2024-05-20 | End: 2024-05-30

## 2024-05-20 NOTE — TELEPHONE ENCOUNTER
Patient called to obtain medication refills, per last note: Patient is doing well. They have resolved 0/10 pain.    However, follow up states she can call for these refills.     Right Rev ROLANDO, DOS 3/26/2024    Pharmacy: [facility] in [place]

## 2024-05-22 ENCOUNTER — HOSPITAL ENCOUNTER (OUTPATIENT)
Dept: PHYSICAL THERAPY | Age: 74
Setting detail: THERAPIES SERIES
Discharge: HOME | End: 2024-05-22
Payer: MEDICARE

## 2024-05-22 PROCEDURE — 97110 THERAPEUTIC EXERCISES: CPT

## 2024-05-22 PROCEDURE — 97140 MANUAL THERAPY 1/> REGIONS: CPT

## 2024-05-22 NOTE — PROGRESS NOTES
Outpatient Physical Therapy           [place]           [] Phone: [phone]   Fax: [phone]  [place]           [] Phone: [phone]   Fax: [phone]      To: Maximo Mary DO     From: Bright Javier, PT, DPT, OCS      Patient: Madiha Martin                    : 1950  Diagnosis:  Nontraumatic complete tear of right rotator cuff [M75.121]  Chronic pain in right shoulder [M25.511, G89.29]        Treatment Diagnosis:       Date: 2024  [x]  Progress Note                []  Discharge Note    Evaluation Date:  24   Total Visits to date:   10 Cancels/No-shows to date:  0    Subjective:  Pt states waking at night several times. Does feel she is able to use her arm a little more with activities around her home with low level and light activities. Very low reaching and lifting at this time.          Plan of Care/Treatment to date:  [x] Therapeutic Exercise    [x] Modalities:  [x] Therapeutic Activity     [] Ultrasound  [] Electrical Stimulation  [] Gait Training      [] Cervical Traction   [] Lumbar Traction  [x] Neuromuscular Re-education  [x] Cold/hotpack [] Iontophoresis  [x] Instruction in HEP      Other:  [x] Manual Therapy       []  Vasopneumatic  [] Aquatic Therapy       []   Dry Needle Therapy                      Objective/Significant Findings At Last Visit/Comments:    Mid humerus tenderness with palpation      Able to extend elbow with distal bicep tenderness at 3/10.      R Shoulder A/PROM:   flex 70/124°  Scaption: 64/125°  ER = functional to occiput,40 deg  IR: functional to IR, 40 deg     Poor strength into ER with fair strength all other dir with pain with ER and Flex  Full elbow flexion against resistance   Empty end feel at end ranges with pain limiting at end range at 7-8/10      Quick DASH: 66% disability    Assessment:    Madiha has completed 10 visits since start of care. Remains primarily dependent on L UE for most activities with lifting and reaching above

## 2024-05-22 NOTE — FLOWSHEET NOTE
Outpatient Physical Therapy  [place]           [x] Phone: [phone]   Fax: [phone]  [place]           [] Phone: [phone]   Fax: [phone]        Physical Therapy Daily Treatment Note  Date:  2024    Patient Name:  Madiha Martin    :  1950  MRN: [id number]  Restrictions/Precautions:   Sling for 6 weeks, no active resistance to bicep till 6 weeks. ER to 30 deg, scaption to 120 deg till 6 weeks.         Diagnosis:   Nontraumatic complete tear of right rotator cuff [M75.121]    s/p R Reverse TSA   Chronic pain in right shoulder [M25.511, G89.29]    Date of Injury/Surgery: 3/24/24    Treatment Diagnosis:  R UE stiffness, pain, weakness  Insurance/Certification information:  [employer]  Pre-cert       Pt approved for 10 visits includes day of eval     [id number]  only     24-6/15/24            Referring Physician:  Maximo Mary DO     PCP: Jake Dumont MD  Next Doctor Visit:    Plan of care signed (Y/N):  Yes  Outcome Measure: Quick DASH: 86% disability   Visit# / total visits:  10/ 10  Pain level: 2  /10   Goals:     Patient goals: full use to return to PLOF  Short term goals  Time Frame for Short term goals: Defer to Long Term Goals          Long Term Goals Completed by 8 weeks 24       Long Term Goals: 6 weeks   Long Term Goal 1: Pt will demo I with HEP/symptom management.   Progressing.   Long Term Goal 2: Pt will demo able to reach overhead with no increase in pain to ease overhead reaching.  Not MET   Long Term Goal 3: Pt will demo >20 deg improvement in R UE A/PROM to ADLs. Partially MET   Long Term Goal 4: Pt will demo able to reach behind head to complete hair management with min/no increase in pain. Partially MET   Long Term Goal 5: Pt will demo <40% disability per quick DASH to demo improved UE mobility. Not MET   Long Term Goal 6: Pt will demo >4/5 R UE strength within available motion to ease light lifting.  Not MET

## 2024-05-24 ENCOUNTER — HOSPITAL ENCOUNTER (OUTPATIENT)
Dept: PHYSICAL THERAPY | Age: 74
Setting detail: THERAPIES SERIES
Discharge: HOME | End: 2024-05-24
Payer: MEDICARE

## 2024-05-24 PROCEDURE — 97140 MANUAL THERAPY 1/> REGIONS: CPT

## 2024-05-24 PROCEDURE — 97110 THERAPEUTIC EXERCISES: CPT

## 2024-05-24 NOTE — FLOWSHEET NOTE
Outpatient Physical Therapy  [place]           [x] Phone: [phone]   Fax: [phone]  [place]           [] Phone: [phone]   Fax: [phone]        Physical Therapy Daily Treatment Note  Date:  2024    Patient Name:  Madiha Martin    :  1950  MRN: [id number]  Restrictions/Precautions:   Sling for 6 weeks, no active resistance to bicep till 6 weeks. ER to 30 deg, scaption to 120 deg till 6 weeks.         Diagnosis:   Nontraumatic complete tear of right rotator cuff [M75.121]    s/p R Reverse TSA   Chronic pain in right shoulder [M25.511, G89.29]    Date of Injury/Surgery: 3/24/24    Treatment Diagnosis:  R UE stiffness, pain, weakness  Insurance/Certification information:  [employer] Medicare  Pre-cert            Pt approved for 10 more visits until 24     [id number]  [id number]  [id number]  only                  Referring Physician:  Maximo Mary DO     PCP: Jake Dumont MD  Next Doctor Visit:    Plan of care signed (Y/N):  Yes  Outcome Measure: Quick DASH: 86% disability   Visit# / total visits:    Pain level: 4  /10   Goals:     Patient goals: full use to return to PLOF  Short term goals  Time Frame for Short term goals: Defer to Long Term Goals          Long Term Goals Completed by 8 weeks 24       Long Term Goals: 6 weeks   Long Term Goal 1: Pt will demo I with HEP/symptom management.   Progressing.   Long Term Goal 2: Pt will demo able to reach overhead with no increase in pain to ease overhead reaching.  Not MET   Long Term Goal 3: Pt will demo >20 deg improvement in R UE A/PROM to ADLs. Partially MET   Long Term Goal 4: Pt will demo able to reach behind head to complete hair management with min/no increase in pain. Partially MET   Long Term Goal 5: Pt will demo <40% disability per quick DASH to demo improved UE mobility. Not MET   Long Term Goal 6: Pt will demo >4/5 R UE strength within available motion to ease light lifting.  Not MET

## 2024-05-31 ENCOUNTER — HOSPITAL ENCOUNTER (OUTPATIENT)
Dept: PHYSICAL THERAPY | Age: 74
Setting detail: THERAPIES SERIES
Discharge: HOME | End: 2024-05-31
Payer: MEDICARE

## 2024-05-31 PROCEDURE — 97016 VASOPNEUMATIC DEVICE THERAPY: CPT

## 2024-05-31 PROCEDURE — 97110 THERAPEUTIC EXERCISES: CPT

## 2024-05-31 NOTE — FLOWSHEET NOTE
Outpatient Physical Therapy  [place]           [x] Phone: [phone]   Fax: [phone]  [place]           [] Phone: [phone]   Fax: [phone]        Physical Therapy Daily Treatment Note  Date:  2024    Patient Name:  Madiha Martin    :  1950  MRN: [id number]  Restrictions/Precautions:   Sling for 6 weeks, no active resistance to bicep till 6 weeks. ER to 30 deg, scaption to 120 deg till 6 weeks.         Diagnosis:   Nontraumatic complete tear of right rotator cuff [M75.121]    s/p R Reverse TSA   Chronic pain in right shoulder [M25.511, G89.29]    Date of Injury/Surgery: 3/24/24    Treatment Diagnosis:  R UE stiffness, pain, weakness  Insurance/Certification information:  [employer]  Pre-cert            Pt approved for 10 more visits until 24     [id number]  [id number]  [id number]  only                  Referring Physician:  Maximo Mary DO     PCP: Jake Dumont MD  Next Doctor Visit:    Plan of care signed (Y/N):  Yes  Outcome Measure: Quick DASH: 86% disability   Visit# / total visits:    Pain level: 3  /10   Goals:     Patient goals: full use to return to PLOF  Short term goals  Time Frame for Short term goals: Defer to Long Term Goals          Long Term Goals Completed by 8 weeks 24       Long Term Goals: 6 weeks   Long Term Goal 1: Pt will demo I with HEP/symptom management.   Progressing.   Long Term Goal 2: Pt will demo able to reach overhead with no increase in pain to ease overhead reaching.  Not MET   Long Term Goal 3: Pt will demo >20 deg improvement in R UE A/PROM to ADLs. Partially MET   Long Term Goal 4: Pt will demo able to reach behind head to complete hair management with min/no increase in pain. Partially MET   Long Term Goal 5: Pt will demo <40% disability per quick DASH to demo improved UE mobility. Not MET   Long Term Goal 6: Pt will demo >4/5 R UE strength within available motion to ease light lifting.  Not MET

## 2024-06-03 ENCOUNTER — HOSPITAL ENCOUNTER (OUTPATIENT)
Dept: PHYSICAL THERAPY | Age: 74
Setting detail: THERAPIES SERIES
Discharge: HOME | End: 2024-06-03
Payer: MEDICARE

## 2024-06-03 PROCEDURE — 97110 THERAPEUTIC EXERCISES: CPT

## 2024-06-03 PROCEDURE — 97016 VASOPNEUMATIC DEVICE THERAPY: CPT

## 2024-06-03 NOTE — FLOWSHEET NOTE
Outpatient Physical Therapy  [place]           [x] Phone: [phone]   Fax: [phone]  [place]           [] Phone: [phone]   Fax: [phone]        Physical Therapy Daily Treatment Note  Date:  6/3/2024    Patient Name:  Madiha Martin    :  1950  MRN: [id number]  Restrictions/Precautions:   Sling for 6 weeks, no active resistance to bicep till 6 weeks. ER to 30 deg, scaption to 120 deg till 6 weeks.         Diagnosis:   Nontraumatic complete tear of right rotator cuff [M75.121]    s/p R Reverse TSA   Chronic pain in right shoulder [M25.511, G89.29]    Date of Injury/Surgery: 3/24/24    Treatment Diagnosis:  R UE stiffness, pain, weakness  Insurance/Certification information:  [employer]  Pre-cert            Pt approved for 10 more visits until 24     [id number]  [id number]  [id number]  only              Referring Physician:  Maximo Mary DO     PCP: Jake Dumont MD  Next Doctor Visit:    Plan of care signed (Y/N):  Yes  Outcome Measure: Quick DASH: 86% disability   Visit# / total visits:    Pain level: 3  /10   Goals:     Patient goals: full use to return to PLOF  Short term goals  Time Frame for Short term goals: Defer to Long Term Goals    Long Term Goals Completed by 8 weeks 24       Long Term Goals: 6 weeks   Long Term Goal 1: Pt will demo I with HEP/symptom management.   Progressing.   Long Term Goal 2: Pt will demo able to reach overhead with no increase in pain to ease overhead reaching.  Not MET   Long Term Goal 3: Pt will demo >20 deg improvement in R UE A/PROM to ADLs. Partially MET   Long Term Goal 4: Pt will demo able to reach behind head to complete hair management with min/no increase in pain. Partially MET   Long Term Goal 5: Pt will demo <40% disability per quick DASH to demo improved UE mobility. Not MET   Long Term Goal 6: Pt will demo >4/5 R UE strength within available motion to ease light lifting.  Not MET        Summary of

## 2024-06-06 ENCOUNTER — HOSPITAL ENCOUNTER (OUTPATIENT)
Dept: PHYSICAL THERAPY | Age: 74
Setting detail: THERAPIES SERIES
Discharge: HOME | End: 2024-06-06
Payer: MEDICARE

## 2024-06-06 PROCEDURE — 97110 THERAPEUTIC EXERCISES: CPT

## 2024-06-06 PROCEDURE — 97140 MANUAL THERAPY 1/> REGIONS: CPT

## 2024-06-06 NOTE — FLOWSHEET NOTE
Outpatient Physical Therapy  [place]           [x] Phone: [phone]   Fax: [phone]  [place]           [] Phone: [phone]   Fax: [phone]        Physical Therapy Daily Treatment Note  Date:  2024    Patient Name:  Madiha Martin    :  1950  MRN: [id number]  Restrictions/Precautions:   Sling for 6 weeks, no active resistance to bicep till 6 weeks. ER to 30 deg, scaption to 120 deg till 6 weeks.         Diagnosis:   Nontraumatic complete tear of right rotator cuff [M75.121]    s/p R Reverse TSA   Chronic pain in right shoulder [M25.511, G89.29]    Date of Injury/Surgery: 3/24/24    Treatment Diagnosis:  R UE stiffness, pain, weakness  Insurance/Certification information:  [employer]  Pre-cert            Pt approved for 10 more visits until 24     [id number]  [id number]  [id number] [phone]  only              Referring Physician:  Maximo Mary DO     PCP: Jake Dumont MD  Next Doctor Visit:    Plan of care signed (Y/N):  Yes  Outcome Measure: Quick DASH: 86% disability   Visit# / total visits:    Pain level:  2/10   Goals:     Patient goals: full use to return to PLOF  Short term goals  Time Frame for Short term goals: Defer to Long Term Goals    Long Term Goals Completed by 8 weeks 24       Long Term Goals: 6 weeks   Long Term Goal 1: Pt will demo I with HEP/symptom management.   Progressing.   Long Term Goal 2: Pt will demo able to reach overhead with no increase in pain to ease overhead reaching.  Not MET   Long Term Goal 3: Pt will demo >20 deg improvement in R UE A/PROM to ADLs. Partially MET   Long Term Goal 4: Pt will demo able to reach behind head to complete hair management with min/no increase in pain. Partially MET   Long Term Goal 5: Pt will demo <40% disability per quick DASH to demo improved UE mobility. Not MET   Long Term Goal 6: Pt will demo >4/5 R UE strength within available motion to ease light lifting.  Not MET        Summary of

## 2024-06-07 PROBLEM — Z09 POSTOPERATIVE EXAMINATION: Status: RESOLVED | Noted: 2024-04-17 | Resolved: 2024-06-07

## 2024-06-11 ENCOUNTER — HOSPITAL ENCOUNTER (OUTPATIENT)
Dept: PHYSICAL THERAPY | Age: 74
Setting detail: THERAPIES SERIES
Discharge: HOME | End: 2024-06-11
Payer: MEDICARE

## 2024-06-11 PROCEDURE — 97110 THERAPEUTIC EXERCISES: CPT

## 2024-06-11 PROCEDURE — 97140 MANUAL THERAPY 1/> REGIONS: CPT

## 2024-06-11 PROCEDURE — 97530 THERAPEUTIC ACTIVITIES: CPT

## 2024-06-11 NOTE — FLOWSHEET NOTE
Outpatient Physical Therapy  [place]           [x] Phone: [phone]   Fax: [phone]  [place]           [] Phone: [phone]   Fax: [phone]        Physical Therapy Daily Treatment Note  Date:  2024    Patient Name:  Madiha Martin    :  1950  MRN: [id number]  Restrictions/Precautions:   Sling for 6 weeks, no active resistance to bicep till 6 weeks. ER to 30 deg, scaption to 120 deg till 6 weeks.         Diagnosis:   Nontraumatic complete tear of right rotator cuff [M75.121]    s/p R Reverse TSA   Chronic pain in right shoulder [M25.511, G89.29]    Date of Injury/Surgery: 3/24/24    Treatment Diagnosis:  R UE stiffness, pain, weakness  Insurance/Certification information:  [employer]  Pre-cert            Pt approved for 10 more visits until 24     [id number]  [id number]  [id number] [phone] [id number]  only              Referring Physician:  Maximo Mary DO     PCP: Jake Dumont MD  Next Doctor Visit:    Plan of care signed (Y/N):  Yes  Outcome Measure: Quick DASH: 86% disability   Visit# / total visits:  15  Pain level:   0/10   Goals:     Patient goals: full use to return to PLOF  Short term goals  Time Frame for Short term goals: Defer to Long Term Goals    Long Term Goals Completed by 8 weeks 24       Long Term Goals: 6 weeks   Long Term Goal 1: Pt will demo I with HEP/symptom management.   Progressing.   Long Term Goal 2: Pt will demo able to reach overhead with no increase in pain to ease overhead reaching.  Not MET   Long Term Goal 3: Pt will demo >20 deg improvement in R UE A/PROM to ADLs. Partially MET   Long Term Goal 4: Pt will demo able to reach behind head to complete hair management with min/no increase in pain. Partially MET   Long Term Goal 5: Pt will demo <40% disability per quick DASH to demo improved UE mobility. Not MET   Long Term Goal 6: Pt will demo >4/5 R UE strength within available motion to ease light lifting.  Not MET        Summary of

## 2024-06-13 ENCOUNTER — HOSPITAL ENCOUNTER (OUTPATIENT)
Dept: PHYSICAL THERAPY | Age: 74
Setting detail: THERAPIES SERIES
Discharge: HOME | End: 2024-06-13
Payer: MEDICARE

## 2024-06-13 PROCEDURE — 97016 VASOPNEUMATIC DEVICE THERAPY: CPT

## 2024-06-13 PROCEDURE — 97110 THERAPEUTIC EXERCISES: CPT

## 2024-06-13 PROCEDURE — 97140 MANUAL THERAPY 1/> REGIONS: CPT

## 2024-06-13 NOTE — FLOWSHEET NOTE
Outpatient Physical Therapy  [place]           [x] Phone: [phone]   Fax: [phone]  [place]           [] Phone: [phone]   Fax: [phone]        Physical Therapy Daily Treatment Note  Date:  2024    Patient Name:  Madiha Martin    :  1950  MRN: [id number]  Restrictions/Precautions:   Sling for 6 weeks, no active resistance to bicep till 6 weeks. ER to 30 deg, scaption to 120 deg till 6 weeks.         Diagnosis:   Nontraumatic complete tear of right rotator cuff [M75.121]    s/p R Reverse TSA   Chronic pain in right shoulder [M25.511, G89.29]      Date of Injury/Surgery: 3/24/24    Treatment Diagnosis:  R UE stiffness, pain, weakness  Insurance/Certification information:  [employer]  Pre-cert            Pt approved for 10 more visits until 24     [id number]  [id number]  [id number] [phone]  only              Referring Physician:  Maximo Mary DO     PCP: Jake Dumont MD  Next Doctor Visit:    Plan of care signed (Y/N):  Yes  Outcome Measure: Quick DASH: 86% disability     Visit# / total visits:      Pain level:   3 /10   Goals:     Patient goals: full use to return to PLOF  Short term goals  Time Frame for Short term goals: Defer to Long Term Goals    Long Term Goals Completed by 8 weeks 24       Long Term Goals: 6 weeks   Long Term Goal 1: Pt will demo I with HEP/symptom management.   Progressing.   Long Term Goal 2: Pt will demo able to reach overhead with no increase in pain to ease overhead reaching.  Not MET   Long Term Goal 3: Pt will demo >20 deg improvement in R UE A/PROM to ADLs. Partially MET   Long Term Goal 4: Pt will demo able to reach behind head to complete hair management with min/no increase in pain. Partially MET   Long Term Goal 5: Pt will demo <40% disability per quick DASH to demo improved UE mobility. Not MET   Long Term Goal 6: Pt will demo >4/5 R UE strength within available motion to ease light lifting.  Not MET

## 2024-06-16 ASSESSMENT — PROMIS GLOBAL HEALTH SCALE
IN THE PAST 7 DAYS, HOW OFTEN HAVE YOU BEEN BOTHERED BY EMOTIONAL PROBLEMS, SUCH AS FEELING ANXIOUS, DEPRESSED, OR IRRITABLE [ON A SCALE FROM 1 (NEVER) TO 5 (ALWAYS)]?: NEVER
IN THE PAST 7 DAYS, HOW WOULD YOU RATE YOUR FATIGUE ON AVERAGE [ON A SCALE FROM 1 (NONE) TO 5 (VERY SEVERE)]?: MILD
IN THE PAST 7 DAYS, HOW WOULD YOU RATE YOUR PAIN ON AVERAGE [ON A SCALE FROM 0 (NO PAIN) TO 10 (WORST IMAGINABLE PAIN)]?: 2
IN GENERAL, HOW WOULD YOU RATE YOUR SATISFACTION WITH YOUR SOCIAL ACTIVITIES AND RELATIONSHIPS [ON A SCALE OF 1 (POOR) TO 5 (EXCELLENT)]?: GOOD
IN GENERAL, HOW WOULD YOU RATE YOUR SATISFACTION WITH YOUR SOCIAL ACTIVITIES AND RELATIONSHIPS [ON A SCALE OF 1 (POOR) TO 5 (EXCELLENT)]?: GOOD
HOW IS THE PROMIS V1.1 BEING ADMINISTERED?: ELECTRONIC
IN GENERAL, WOULD YOU SAY YOUR QUALITY OF LIFE IS...[ON A SCALE OF 1 (POOR) TO 5 (EXCELLENT)]: GOOD
IN GENERAL, PLEASE RATE HOW WELL YOU CARRY OUT YOUR USUAL SOCIAL ACTIVITIES (INCLUDES ACTIVITIES AT HOME, AT WORK, AND IN YOUR COMMUNITY, AND RESPONSIBILITIES AS A PARENT, CHILD, SPOUSE, EMPLOYEE, FRIEND, ETC) [ON A SCALE OF 1 (POOR) TO 5 (EXCELLENT)]?: GOOD
IN THE PAST 7 DAYS, HOW WOULD YOU RATE YOUR FATIGUE ON AVERAGE [ON A SCALE FROM 1 (NONE) TO 5 (VERY SEVERE)]?: MILD
IN THE PAST 7 DAYS, HOW WOULD YOU RATE YOUR PAIN ON AVERAGE [ON A SCALE FROM 0 (NO PAIN) TO 10 (WORST IMAGINABLE PAIN)]?: 2
HOW IS THE PROMIS V1.1 BEING ADMINISTERED?: ELECTRONIC
IN GENERAL, HOW WOULD YOU RATE YOUR PHYSICAL HEALTH [ON A SCALE OF 1 (POOR) TO 5 (EXCELLENT)]?: GOOD
WHO IS THE PERSON COMPLETING THE PROMIS V1.1 SURVEY?: SELF
TO WHAT EXTENT ARE YOU ABLE TO CARRY OUT YOUR EVERYDAY PHYSICAL ACTIVITIES SUCH AS WALKING, CLIMBING STAIRS, CARRYING GROCERIES, OR MOVING A CHAIR [ON A SCALE OF 1 (NOT AT ALL) TO 5 (COMPLETELY)]?: MOSTLY
IN THE PAST 7 DAYS, HOW OFTEN HAVE YOU BEEN BOTHERED BY EMOTIONAL PROBLEMS, SUCH AS FEELING ANXIOUS, DEPRESSED, OR IRRITABLE [ON A SCALE FROM 1 (NEVER) TO 5 (ALWAYS)]?: NEVER
WHO IS THE PERSON COMPLETING THE PROMIS V1.1 SURVEY?: SELF
SUM OF RESPONSES TO QUESTIONS 3, 6, 7, & 8: 13
TO WHAT EXTENT ARE YOU ABLE TO CARRY OUT YOUR EVERYDAY PHYSICAL ACTIVITIES SUCH AS WALKING, CLIMBING STAIRS, CARRYING GROCERIES, OR MOVING A CHAIR [ON A SCALE OF 1 (NOT AT ALL) TO 5 (COMPLETELY)]?: MOSTLY
IN GENERAL, HOW WOULD YOU RATE YOUR MENTAL HEALTH, INCLUDING YOUR MOOD AND YOUR ABILITY TO THINK [ON A SCALE OF 1 (POOR) TO 5 (EXCELLENT)]?: GOOD
IN GENERAL, PLEASE RATE HOW WELL YOU CARRY OUT YOUR USUAL SOCIAL ACTIVITIES (INCLUDES ACTIVITIES AT HOME, AT WORK, AND IN YOUR COMMUNITY, AND RESPONSIBILITIES AS A PARENT, CHILD, SPOUSE, EMPLOYEE, FRIEND, ETC) [ON A SCALE OF 1 (POOR) TO 5 (EXCELLENT)]?: GOOD
IN GENERAL, WOULD YOU SAY YOUR HEALTH IS...[ON A SCALE OF 1 (POOR) TO 5 (EXCELLENT)]: GOOD
SUM OF RESPONSES TO QUESTIONS 2, 4, 5, & 10: 14

## 2024-06-18 ENCOUNTER — HOSPITAL ENCOUNTER (OUTPATIENT)
Dept: PHYSICAL THERAPY | Age: 74
Setting detail: THERAPIES SERIES
Discharge: HOME | End: 2024-06-18
Payer: MEDICARE

## 2024-06-18 PROCEDURE — 97110 THERAPEUTIC EXERCISES: CPT

## 2024-06-18 PROCEDURE — 97530 THERAPEUTIC ACTIVITIES: CPT

## 2024-06-18 PROCEDURE — 97140 MANUAL THERAPY 1/> REGIONS: CPT

## 2024-06-18 NOTE — FLOWSHEET NOTE
Summary of Evaluation:   Pt is 73 year old female s/p R shoulder Reverse TSA with bicep tenodesis 3/26/24. Pt now has difficulties completing use of dominant R UE for ADLs, sleeping, household cleaning/cooking and self management. Pt demo deficits this date that include expected deficit in R UE A/PROM, poor strength and min-mod pain. Pt will benefit with PT services with progression of strength/ROM, manual and modalities per protocol to return to OF. Pt prior to onset of current condition had no pain with able to complete full ADLs and household activities. Patient received education on their current pathology and how their condition effects them with their functional activities. Patient understood discussion and questions were answered. Patient understands their activity limitations and understands rational for treatment progression.      Subjective:   states she is sore in the shoulder. Pain overall is better. Completing all activities in the house except sweeping. Taking tylenol as needed. No issues after last visit. Follow up tomorrow with surgeon.     Any changes in Ambulatory Summary Sheet?        Objective:   Not able to reach behind head to do hair able to reach more laterally, sacral level behind waist.  Mild pain noted with roller on wall   Bicep stretch with full elbow ext with overpressure.     88 deg standing shoulder flexion post tx.       Exercises: (No more than 4 columns)   Exercise/Equipment    Pt states surgery was 3-26-24 not 3-24-24 ( this is a Sunday) 6-13-24  11 weeks post op 6/18/24  #17          WARM UP        Manual     [place]  Flex x 2 mins, , 5 sec holds, cues not to push into pain Flex  2'    TABLE     *scap retractions      *Table scaption slides      *power       Supine bicep stretch   1# 1'x2   Isometrics      AA/punch  2# 10x2   Supine AA/ER,IR scap plane 1# bar x 20    Supine circles  1# x 20 cw/ccw 1# x 20 cw/ccw   Supine flex ARC  1# 10x2   Supine AAROM flexion with

## 2024-06-19 ENCOUNTER — OFFICE VISIT (OUTPATIENT)
Dept: ORTHOPEDIC SURGERY | Age: 74
End: 2024-06-19

## 2024-06-19 VITALS — BODY MASS INDEX: 26.12 KG/M2 | OXYGEN SATURATION: 97 % | HEIGHT: 64 IN | HEART RATE: 63 BPM | WEIGHT: 153 LBS

## 2024-06-19 DIAGNOSIS — Z09 POSTOPERATIVE EXAMINATION: Primary | ICD-10-CM

## 2024-06-19 PROCEDURE — 99024 POSTOP FOLLOW-UP VISIT: CPT | Performed by: STUDENT IN AN ORGANIZED HEALTH CARE EDUCATION/TRAINING PROGRAM

## 2024-06-19 NOTE — PROGRESS NOTES
Date of surgery: 3-     Procedure:   Right reverse total shoulder arthroplasty (13077) without a subscapularis repair   Right shoulder open biceps tenodesis (90585)      History:  Madiha is here in follow up regarding their 12-week postop appointment for above procedure    Patient is doing well. They have resolved 0/10 pain. They deny chest pain, SOB, calf pain,fever,wound drainage.  No other issues.  Patient denies any constitutional symptoms.    Patient states they have been compliant with restrictions.    Patient states they have been out of her sling without issue.  She says that physical therapy is been going well.  They are now working on range of motion which remains limited but improving slowly    Patient has been taking ASA for DVT prophylaxis     Physical:   Patient demonstrates appropriate mood and affect.     Right upper extremity:   The incision is well-healed and is clean, dry, intact, and nontender with no erythema. They have no edema, the Arm compartments are soft . There are No cords or calf tenderness.  No significant calf/ankle edema. They are neurovascularly intact distally.     Patient able to reach the top of her head.  Able to reach sacrum    No areas of tenderness to palpation    Negative Amara's    Imaging:   3 views of the right shoulder in a skeletally mature patient demonstrates no acute osseous normalities.  Prior reverse total shoulder arthroplasty with no obvious complications.  Prosthesis remains well-positioned and reduced.  No concerning findings related to screw or prosthesis.  No soft tissue concerns or bony abnormalities including periprosthetic fracture.    Previous Imaging:  3 views of the right shoulder in a skeletally mature patient demonstrates no acute osseous normalities.  Prior reverse total shoulder arthroplasty with no obvious complications.  Prosthesis remains well-positioned and reduced.  No concerning findings related to screw or prosthesis.  No soft tissue

## 2024-06-19 NOTE — PROGRESS NOTES
PT comes in today for a 3 month post op s/p RT RTSA on 3/26/24. Pain 1/10 today. She is currently in PT and doing well. She is still limited with ROM and weakness. She denies any new fall or injury. X-rays were taken today.

## 2024-06-21 ENCOUNTER — HOSPITAL ENCOUNTER (OUTPATIENT)
Dept: PHYSICAL THERAPY | Age: 74
Setting detail: THERAPIES SERIES
Discharge: HOME | End: 2024-06-21
Payer: MEDICARE

## 2024-06-21 PROCEDURE — 97110 THERAPEUTIC EXERCISES: CPT

## 2024-06-21 PROCEDURE — 97140 MANUAL THERAPY 1/> REGIONS: CPT

## 2024-06-21 PROCEDURE — 97530 THERAPEUTIC ACTIVITIES: CPT

## 2024-06-21 NOTE — FLOWSHEET NOTE
Outpatient Physical Therapy  [place]           [x] Phone: [phone]   Fax: [phone]  [place]           [] Phone: [phone]   Fax: [phone]        Physical Therapy Daily Treatment Note  Date:  2024    Patient Name:  Madiha Martin    :  1950  MRN: [id number]  Restrictions/Precautions:   Sling for 6 weeks, no active resistance to bicep till 6 weeks. ER to 30 deg, scaption to 120 deg till 6 weeks.         Diagnosis:   Nontraumatic complete tear of right rotator cuff [M75.121]    s/p R Reverse TSA   Chronic pain in right shoulder [M25.511, G89.29]      Date of Injury/Surgery: 3/24/24    Treatment Diagnosis:  R UE stiffness, pain, weakness  Insurance/Certification information:  [employer]  Pre-cert            Pt approved for 10 more visits until 24     [id number]  [id number]  [phone]  only              Referring Physician:  Maximo Mary DO     PCP: Jake Dumont MD  Next Doctor Visit:    Plan of care signed (Y/N):  Yes  Outcome Measure: Quick DASH: 86% disability     Visit# / total visits:      Pain level:    2 /10   Goals:     Patient goals: full use to return to PLOF  Short term goals  Time Frame for Short term goals: Defer to Long Term Goals    Long Term Goals Completed by 8 weeks 24       Long Term Goals: 6 weeks   Long Term Goal 1: Pt will demo I with HEP/symptom management.   Progressing.   Long Term Goal 2: Pt will demo able to reach overhead with no increase in pain to ease overhead reaching.  Not MET   Long Term Goal 3: Pt will demo >20 deg improvement in R UE A/PROM to ADLs. Partially MET   Long Term Goal 4: Pt will demo able to reach behind head to complete hair management with min/no increase in pain. Partially MET   Long Term Goal 5: Pt will demo <40% disability per quick DASH to demo improved UE mobility. Not MET   Long Term Goal 6: Pt will demo >4/5 R UE strength within available motion to ease light lifting.  Not MET

## 2024-06-24 ENCOUNTER — HOSPITAL ENCOUNTER (OUTPATIENT)
Dept: PHYSICAL THERAPY | Age: 74
Setting detail: THERAPIES SERIES
Discharge: HOME | End: 2024-06-24
Payer: MEDICARE

## 2024-06-24 PROCEDURE — 97110 THERAPEUTIC EXERCISES: CPT

## 2024-06-24 PROCEDURE — 97140 MANUAL THERAPY 1/> REGIONS: CPT

## 2024-06-24 PROCEDURE — 97016 VASOPNEUMATIC DEVICE THERAPY: CPT

## 2024-06-24 NOTE — FLOWSHEET NOTE
tolerated.      End of session: 0/10 following vaso     Pt is 73 year old female s/p R shoulder Reverse TSA with bicep tenodesis 3/26/24. Pt now has difficulties completing use of dominant R UE for ADLs, sleeping, household cleaning/cooking and self management. Pt demo deficits this date that include expected deficit in R UE A/PROM, poor strength and min-mod pain. Pt will benefit with PT services with progression of strength/ROM, manual and modalities per protocol to return to PLOF. Pt prior to onset of current condition had no pain with able to complete full ADLs and household activities. Patient received education on their current pathology and how their condition effects them with their functional activities. Patient understood discussion and questions were answered. Patient understands their activity limitations and understands rational for treatment progression.        Plan for Next Session: Specific Instructions for Next Treatment: Follow protocol, PROM into scaption to 120, ER to 30 deg in scaption, sub max iso, , scap activities. Modalities PRN    Time In / Time Out: 1031/1121    Timed Code/Total Treatment Minutes: 40/50, (2) TE, (1) MT, (1) Vaso    Next Progress Note due: Eval 4/16/24     PN 5/22/24       Plan of Care Interventions:  [x] Therapeutic Exercise  [x] Modalities:  [x] Therapeutic Activity     [] Ultrasound  [] Estim  [] Gait Training      [] Cervical Traction [] Lumbar Traction  [x] Neuromuscular Re-education    [x] Cold/hotpack [] Iontophoresis   [x] Instruction in HEP      [x] Vasopneumatic   [] Dry Needling    [x] Manual Therapy               [] Aquatic Therapy              Electronically signed by:  Rekha Drew PTA     6/24/2024 10:31 AM

## 2024-06-27 ENCOUNTER — HOSPITAL ENCOUNTER (OUTPATIENT)
Dept: PHYSICAL THERAPY | Age: 74
Setting detail: THERAPIES SERIES
Discharge: HOME | End: 2024-06-27
Payer: MEDICARE

## 2024-06-27 PROCEDURE — 97110 THERAPEUTIC EXERCISES: CPT

## 2024-06-27 PROCEDURE — 97140 MANUAL THERAPY 1/> REGIONS: CPT

## 2024-06-27 PROCEDURE — 97530 THERAPEUTIC ACTIVITIES: CPT

## 2024-06-27 NOTE — PRE-CERTIFICATION NOTE
PT APPROVED FOR 10 MORE VISITS  FOR A TOTAL OF 30 VISITS     [id number] [id number]  only    04/16/24-8/24/24    [facility]#[id number]

## 2024-06-27 NOTE — FLOWSHEET NOTE
discussed with patient. All questions answered. Pt agreed to comply.      Added resisted punches and mid rows YTB 5/24/24.       Manual Treatments: PROM shoulder for flex/scaption/ER/IR to end ranges x13'    Modalities:       Communication with other providers:  POC sent 4/16/24       PN 5/22/24       Assessment:  Madiha tolerated today's session well without any reports of increased pain, although some soreness post session due to fatigue was noted. Pt demonstrated an increase in active and passive range of motion in all directions compared to last progress note and an improvement in strength in all directions except for flexion, although still weak with all movements compared to L UE. Pt continues to be limited by decreased range of motion and strength compared to L UE, with increased soreness and compensations noted with activity. These factors are continuing to negatively impact pt's ability to perform high level ADLs and functional activities. Pt would continue to benefit from skilled therapy interventions to address remaining impairments, improve mobility and strength and progress toward goal completion while reducing risk for re-injury or further decline.     End of session: 0/10        Pt is 73 year old female s/p R shoulder Reverse TSA with bicep tenodesis 3/24/24. Pt now has difficulties completing use of dominant R UE for ADLs, sleeping, household cleaning/cooking and self management. Pt demo deficits this date that include expected deficit in R UE A/PROM, poor strength and min-mod pain. Pt will benefit with PT services with progression of strength/ROM, manual and modalities per protocol to return to PLOF. Pt prior to onset of current condition had no pain with able to complete full ADLs and household activities. Patient received education on their current pathology and how their condition effects them with their functional activities. Patient understood discussion and questions were answered. Patient

## 2024-07-02 ENCOUNTER — HOSPITAL ENCOUNTER (OUTPATIENT)
Dept: PHYSICAL THERAPY | Age: 74
Setting detail: THERAPIES SERIES
Discharge: HOME | End: 2024-07-02
Payer: MEDICARE

## 2024-07-02 PROCEDURE — 97140 MANUAL THERAPY 1/> REGIONS: CPT

## 2024-07-02 PROCEDURE — 97110 THERAPEUTIC EXERCISES: CPT

## 2024-07-02 NOTE — FLOWSHEET NOTE
Outpatient Physical Therapy  [place]           [x] Phone: [phone]   Fax: [phone]  [place]           [] Phone: [phone]   Fax: [phone]        Physical Therapy Daily Treatment Note  Date:  2024    Patient Name:  Madiha Martin    :  1950  MRN: [id number]  Restrictions/Precautions:   Sling for 6 weeks, no active resistance to bicep till 6 weeks. ER to 30 deg, scaption to 120 deg till 6 weeks.      Diagnosis:   Nontraumatic complete tear of right rotator cuff [M75.121]    s/p R Reverse TSA   Chronic pain in right shoulder [M25.511, G89.29]    Date of Injury/Surgery: 3/24/24    Treatment Diagnosis:  R UE stiffness, pain, weakness  Insurance/Certification information:  [employer]  Pre-cert            Pt approved for 10 more visits until 24     [id number]  [id number]  [id number] [phone]  only              Referring Physician:  Maximo Mary DO     PCP: Jake Dumont MD  Next Doctor Visit:    Plan of care signed (Y/N):  Yes  Outcome Measure: Quick DASH: 86% disability   Visit# / total visits:    Pain level:  0/10   Goals:     Patient goals: full use to return to PLOF  Short term goals  Time Frame for Short term goals: Defer to Long Term Goals    Long Term Goals Completed by 8 weeks 24       Long Term Goals: 6 weeks   Long Term Goal 1: Pt will demo I with HEP/symptom management. Progressing  Long Term Goal 2: Pt will demo able to reach overhead with no increase in pain to ease overhead reaching.  Not MET   Long Term Goal 3: Pt will demo >20 deg improvement in R UE A/PROM to ADLs. MET   Long Term Goal 4: Pt will demo able to reach behind head to complete hair management with min/no increase in pain. Partially MET   Long Term Goal 5: Pt will demo <40% disability per quick DASH to demo improved UE mobility. MET   Long Term Goal 6: Pt will demo >4/5 R UE strength within available motion to ease light lifting. MET with all except R shoulder flexion       Summary

## 2024-07-05 RX ORDER — ATORVASTATIN CALCIUM 20 MG/1
TABLET, FILM COATED ORAL
Qty: 90 TABLET | Refills: 1 | Status: SHIPPED | OUTPATIENT
Start: 2024-07-05

## 2024-07-08 ENCOUNTER — HOSPITAL ENCOUNTER (OUTPATIENT)
Dept: PHYSICAL THERAPY | Age: 74
Setting detail: THERAPIES SERIES
Discharge: HOME | End: 2024-07-08
Payer: MEDICARE

## 2024-07-08 PROCEDURE — 97110 THERAPEUTIC EXERCISES: CPT

## 2024-07-08 PROCEDURE — 97140 MANUAL THERAPY 1/> REGIONS: CPT

## 2024-07-08 NOTE — FLOWSHEET NOTE
strength/ROM, manual and modalities per protocol to return to PLOF. Pt prior to onset of current condition had no pain with able to complete full ADLs and household activities. Patient received education on their current pathology and how their condition effects them with their functional activities. Patient understood discussion and questions were answered. Patient understands their activity limitations and understands rational for treatment progression.      Plan for Next Session: Specific Instructions for Next Treatment: Follow protocol, PROM into scaption to 120, ER to 30 deg in scaption, sub max iso, , scap activities. Modalities PRN    Time In / Time Out: 1115/1158    Timed Code/Total Treatment Minutes: 43/43,  (2) TE, (1) MT      Next Progress Note due: Eval 4/16/24     PN 5/22/24       Plan of Care Interventions:  [x] Therapeutic Exercise  [x] Modalities:  [x] Therapeutic Activity     [] Ultrasound  [] Estim  [] Gait Training      [] Cervical Traction [] Lumbar Traction  [x] Neuromuscular Re-education    [x] Cold/hotpack [] Iontophoresis   [x] Instruction in HEP      [x] Vasopneumatic   [] Dry Needling    [x] Manual Therapy               [] Aquatic Therapy              Electronically signed by:  Rekha Drew, MADDIE     7/8/2024 11:15 AM

## 2024-07-11 ENCOUNTER — HOSPITAL ENCOUNTER (OUTPATIENT)
Dept: PHYSICAL THERAPY | Age: 74
Setting detail: THERAPIES SERIES
Discharge: HOME | End: 2024-07-11
Payer: MEDICARE

## 2024-07-11 PROCEDURE — 97140 MANUAL THERAPY 1/> REGIONS: CPT

## 2024-07-11 PROCEDURE — 97110 THERAPEUTIC EXERCISES: CPT

## 2024-07-11 PROCEDURE — 97530 THERAPEUTIC ACTIVITIES: CPT

## 2024-07-11 NOTE — FLOWSHEET NOTE
Outpatient Physical Therapy  [place]           [x] Phone: [phone]   Fax: [phone]  [place]           [] Phone: [phone]   Fax: [phone]        Physical Therapy Daily Treatment Note  Date:  2024    Patient Name:  Madiha Martin    :  1950  MRN: [id number]  Restrictions/Precautions:   Sling for 6 weeks, no active resistance to bicep till 6 weeks. ER to 30 deg, scaption to 120 deg till 6 weeks.   Diagnosis: Nontraumatic complete tear of right rotator cuff [M75.121]    s/p R Reverse TSA   Chronic pain in right shoulder [M25.511, G89.29]   Date of Injury/Surgery: 3/24/24    Treatment Diagnosis:  R UE stiffness, pain, weakness  Insurance/Certification information: [employer]  Pre-cert            Pt approved for 10 more visits until 24     [id number]  [id number]  [id number]  only              Referring Physician:  Maximo Mary DO     PCP: Jake Dumont MD  Next Doctor Visit:    Plan of care signed (Y/N):  Yes  Outcome Measure: Quick DASH: 86% disability   Visit# / total visits:    Pain level:  0/10   Goals:     Patient goals: full use to return to PLOF  Short term goals  Time Frame for Short term goals: Defer to Long Term Goals    Long Term Goals Completed by 8 weeks 24       Long Term Goals: 6 weeks   Long Term Goal 1: Pt will demo I with HEP/symptom management. Progressing  Long Term Goal 2: Pt will demo able to reach overhead with no increase in pain to ease overhead reaching.  Not MET   Long Term Goal 3: Pt will demo >20 deg improvement in R UE A/PROM to ADLs. MET   Long Term Goal 4: Pt will demo able to reach behind head to complete hair management with min/no increase in pain. Partially MET   Long Term Goal 5: Pt will demo <40% disability per quick DASH to demo improved UE mobility. MET   Long Term Goal 6: Pt will demo >4/5 R UE strength within available motion to ease light lifting. MET with all except R shoulder flexion       Summary of

## 2024-07-15 ENCOUNTER — HOSPITAL ENCOUNTER (OUTPATIENT)
Dept: PHYSICAL THERAPY | Age: 74
Setting detail: THERAPIES SERIES
Discharge: HOME | End: 2024-07-15
Payer: MEDICARE

## 2024-07-15 PROCEDURE — 97016 VASOPNEUMATIC DEVICE THERAPY: CPT

## 2024-07-15 PROCEDURE — 97110 THERAPEUTIC EXERCISES: CPT

## 2024-07-15 NOTE — FLOWSHEET NOTE
direction Tennis ball 10x2 each direction Tennis ball 10x2 each direction   Single arm row         Shoulder punches  GTB 10x2  10x2 GTB   Mid rows  GTB 10x2 10x2 GTB 10x2 GTB   Ball on Wall Flexion Stretch      Internal rotation towel stretch (horizontal)  10x5\" holds 10x5\" holds   Roller on wall  2x10 10x2 flexion/scaption 10x2 flexion/scaption   Pulldowns       Scaption on tall table 2# 10x2 flexion 3# 10x2 flexion  2# 10x2 abduction 3# 10x1 flexion  2# 10x1 flex  2# scap 1x10   Shoulder circles on wall with ball      Across body wall taps      UE cone grabs on counter      CC shoulder flex stretch       Bicep stretch against counter      FM single arm pull  10# 2x10     Tennis ball behind head 10x2 each direction 10x2 each direction 10x2 each direction   External rotation stretch (horizontal) 5\"x10 with PVC pipe 10x2 with 5\" hold with PVC pipe 10x2 with 5\" hold with PVC pipe   Resisted IR  10x2 RTB 10x2 RTB               MODALITIES      Vaso (Billable CPT code)                 Other Therapeutic Activities/Education:  --      Home Exercise Program:  *HO issued, reviewed and discussed with patient. All questions answered. Pt agreed to comply.      Added resisted punches and mid rows YTB 5/24/24.       Manual Treatments:   Modalities: Patient received vasocompression on their R shoulder for pain and inflammation for  10 min on low pressure. Patient had negative skin reaction afterwards.       Communication with other providers:  POC sent 4/16/24       PN 5/22/24       Assessment: Madiha demonstrated overall good tolerance to today's session. Instructed to use lighter wt or no wt with shld fwd and side lifts until her next visit on Wed. Added vaso back in today to help with increase soreness. Soreness did decrease to 1/10 following vaso.    Will continue to benefit from skilled therapy interventions to address remaining impairments, improve mobility and strength and progress toward goal completion while reducing risk

## 2024-07-17 ENCOUNTER — HOSPITAL ENCOUNTER (OUTPATIENT)
Dept: PHYSICAL THERAPY | Age: 74
Setting detail: THERAPIES SERIES
Discharge: HOME | End: 2024-07-17
Payer: MEDICARE

## 2024-07-17 PROCEDURE — 97140 MANUAL THERAPY 1/> REGIONS: CPT

## 2024-07-17 PROCEDURE — 97110 THERAPEUTIC EXERCISES: CPT

## 2024-07-17 PROCEDURE — 97530 THERAPEUTIC ACTIVITIES: CPT

## 2024-07-17 NOTE — FLOWSHEET NOTE
Outpatient Physical Therapy  [place]           [x] Phone: [phone]   Fax: [phone]  [place]           [] Phone: [phone]   Fax: [phone]        Physical Therapy Daily Treatment Note  Date:  2024    Patient Name:  Madiha Martin    :  1950  MRN: [id number]  Restrictions/Precautions:   Sling for 6 weeks, no active resistance to bicep till 6 weeks. ER to 30 deg, scaption to 120 deg till 6 weeks.   Diagnosis: Nontraumatic complete tear of right rotator cuff [M75.121]    s/p R Reverse TSA   Chronic pain in right shoulder [M25.511, G89.29]   Date of Injury/Surgery: 3/24/24    Treatment Diagnosis:  R UE stiffness, pain, weakness  Insurance/Certification information: [employer]  Pre-cert            Pt approved for 10 more visits until 24     [id number]  [id number]  [id number]  only              Referring Physician:  Maximo Mary DO     PCP: Jake Dumont MD  Next Doctor Visit:    Plan of care signed (Y/N):  Yes  Outcome Measure: Quick DASH: 86% disability   Visit# / total visits:    Pain level:  /10, soreness   Goals:     Patient goals: full use to return to PLOF  Short term goals  Time Frame for Short term goals: Defer to Long Term Goals    Long Term Goals Completed by 8 weeks 24       Long Term Goals: 6 weeks   Long Term Goal 1: Pt will demo I with HEP/symptom management. Progressing  Long Term Goal 2: Pt will demo able to reach overhead with no increase in pain to ease overhead reaching.  Not MET   Long Term Goal 3: Pt will demo >20 deg improvement in R UE A/PROM to ADLs. MET   Long Term Goal 4: Pt will demo able to reach behind head to complete hair management with min/no increase in pain. Partially MET   Long Term Goal 5: Pt will demo <40% disability per quick DASH to demo improved UE mobility. MET   Long Term Goal 6: Pt will demo >4/5 R UE strength within available motion to ease light lifting. MET with all except R shoulder flexion

## 2024-07-19 PROBLEM — Z09 POSTOPERATIVE EXAMINATION: Status: RESOLVED | Noted: 2024-04-17 | Resolved: 2024-07-19

## 2024-07-22 ENCOUNTER — HOSPITAL ENCOUNTER (OUTPATIENT)
Dept: PHYSICAL THERAPY | Age: 74
Setting detail: THERAPIES SERIES
Discharge: HOME | End: 2024-07-22
Payer: MEDICARE

## 2024-07-22 PROCEDURE — 97110 THERAPEUTIC EXERCISES: CPT

## 2024-07-22 PROCEDURE — 97140 MANUAL THERAPY 1/> REGIONS: CPT

## 2024-07-22 NOTE — FLOWSHEET NOTE
Outpatient Physical Therapy  [place]           [x] Phone: [phone]   Fax: [phone]  [place]           [] Phone: [phone]   Fax: [phone]        Physical Therapy Daily Treatment Note  Date:  2024    Patient Name:  Madiha Martin    :  1950  MRN: [id number]  Restrictions/Precautions:   Sling for 6 weeks, no active resistance to bicep till 6 weeks. ER to 30 deg, scaption to 120 deg till 6 weeks.   Diagnosis: Nontraumatic complete tear of right rotator cuff [M75.121]    s/p R Reverse TSA   Chronic pain in right shoulder [M25.511, G89.29]   Date of Injury/Surgery: 3/24/24    Treatment Diagnosis:  R UE stiffness, pain, weakness  Insurance/Certification information: [employer]  Pre-cert            Pt approved for 10 more visits until 24     [id number]  [id number]  [id number]  only              Referring Physician:  Maximo Mary DO     PCP: Jake Dumont MD  Next Doctor Visit:    Plan of care signed (Y/N):  Yes  Outcome Measure: Quick DASH: 86% disability   Visit# / total visits:    Pain level:  2/10, soreness   Goals:     Patient goals: full use to return to PLOF  Short term goals  Time Frame for Short term goals: Defer to Long Term Goals    Long Term Goals Completed by 8 weeks 24       Long Term Goals: 6 weeks   Long Term Goal 1: Pt will demo I with HEP/symptom management. Progressing  Long Term Goal 2: Pt will demo able to reach overhead with no increase in pain to ease overhead reaching.  Not MET   Long Term Goal 3: Pt will demo >20 deg improvement in R UE A/PROM to ADLs. MET   Long Term Goal 4: Pt will demo able to reach behind head to complete hair management with min/no increase in pain. Partially MET   Long Term Goal 5: Pt will demo <40% disability per quick DASH to demo improved UE mobility. MET   Long Term Goal 6: Pt will demo >4/5 R UE strength within available motion to ease light lifting. MET with all except R shoulder flexion

## 2024-07-25 ENCOUNTER — HOSPITAL ENCOUNTER (OUTPATIENT)
Dept: PHYSICAL THERAPY | Age: 74
Setting detail: THERAPIES SERIES
Discharge: HOME | End: 2024-07-25
Payer: MEDICARE

## 2024-07-25 PROCEDURE — 97110 THERAPEUTIC EXERCISES: CPT

## 2024-07-25 PROCEDURE — 97140 MANUAL THERAPY 1/> REGIONS: CPT

## 2024-07-25 NOTE — FLOWSHEET NOTE
except R shoulder flexion       Summary of Evaluation:  Pt is 73 year old female s/p R shoulder Reverse TSA with bicep tenodesis 3/24/24. Pt now has difficulties completing use of dominant R UE for ADLs, sleeping, household cleaning/cooking and self management. Pt demo deficits this date that include expected deficit in R UE A/PROM, poor strength and min-mod pain. Pt will benefit with PT services with progression of strength/ROM, manual and modalities per protocol to return to [facility]. Pt prior to onset of current condition had no pain with able to complete full ADLs and household activities. Patient received education on their current pathology and how their condition effects them with their functional activities. Patient understood discussion and questions were answered. Patient understands their activity limitations and understands rational for treatment progression.        Subjective:  Pt states she is doing better today. Having ice right after exercises is helpful with minimizing soreness she gets following exercises. Also states she has been trying to reach overhead further with activities around her home.       Any changes in Ambulatory Summary Sheet?        Objective:         R shoulder PROM:  Flex: 142 deg  Abduction: 121 deg  ER: 55 deg      Exercises: (No more than 4 columns)   visits approved until 7/31/24  Exercise/Equipment      7/15/24 #24 7/17/24 #25 7/22/24 #26 7/25/24 #27            WARM UP       Manual       [place]  Flex 2' Flex 2' Flex x 2' Flex x 2'   TABLE       *scap retractions        *Table scaption slides        *power         Supine bicep stretch        Isometrics        A/punch 3# 10x2 AROM  2# 2x10 3# 2x10   Supine AA/ER,IR scap plane       Supine circles  3# 10x2 each direction  3# 10x2 each direction 3# 10x2 each direction   Supine flex ARC       Supine AAROM flexion with cane        S/L ER with towel roll 2# 10x2 2#10x2 2#10x2 2#10x2   S/L abd scap plane       Sleeper stretch

## 2024-07-30 ENCOUNTER — HOSPITAL ENCOUNTER (OUTPATIENT)
Dept: PHYSICAL THERAPY | Age: 74
Setting detail: THERAPIES SERIES
Discharge: HOME | End: 2024-07-30
Payer: MEDICARE

## 2024-07-30 PROCEDURE — 97110 THERAPEUTIC EXERCISES: CPT

## 2024-07-30 PROCEDURE — 97140 MANUAL THERAPY 1/> REGIONS: CPT

## 2024-07-30 NOTE — FLOWSHEET NOTE
Outpatient Physical Therapy  [place]           [x] Phone: [phone]   Fax: [phone]  [place]           [] Phone: [phone]   Fax: [phone]        Physical Therapy Daily Treatment Note  Date:  2024    Patient Name:  Madiha Martin    :  1950  MRN: [id number]  Restrictions/Precautions:   Sling for 6 weeks, no active resistance to bicep till 6 weeks. ER to 30 deg, scaption to 120 deg till 6 weeks.   Diagnosis: Nontraumatic complete tear of right rotator cuff [M75.121]    s/p R Reverse TSA   Chronic pain in right shoulder [M25.511, G89.29]   Date of Injury/Surgery: 3/24/24    Treatment Diagnosis:  R UE stiffness, pain, weakness  Insurance/Certification information: [employer]  Pre-cert            Pt approved for 10 more visits until 24     [id number]  [id number]  [id number]  only              Referring Physician:  Maximo Mary DO     PCP: Jake Dumont MD  Next Doctor Visit:    Plan of care signed (Y/N):  Yes  Outcome Measure: Quick DASH: 86% disability   Visit# / total visits:    Pain level:  0/10, \"soreness\" with activity, 0/10 at rest   Goals:     Patient goals: full use to return to PLOF  Short term goals  Time Frame for Short term goals: Defer to Long Term Goals    Long Term Goals Completed by 8 weeks 24       Long Term Goals: 6 weeks   Long Term Goal 1: Pt will demo I with HEP/symptom management. Progressing  Long Term Goal 2: Pt will demo able to reach overhead with no increase in pain to ease overhead reaching.  Not MET   Long Term Goal 3: Pt will demo >20 deg improvement in R UE A/PROM to ADLs. MET   Long Term Goal 4: Pt will demo able to reach behind head to complete hair management with min/no increase in pain. Partially MET   Long Term Goal 5: Pt will demo <40% disability per quick DASH to demo improved UE mobility. MET   Long Term Goal 6: Pt will demo >4/5 R UE strength within available motion to ease light lifting. MET with all

## 2024-07-30 NOTE — PROGRESS NOTES
Outpatient Physical Therapy           [place]           [x] Phone: [phone]   Fax: [phone]  [place]           [] Phone: [phone]   Fax: [phone]      To: Maximo Mary DO     From: Bright Javier PT, DPT, OCS    Patient: Madiha Martin                    : 1950  Diagnosis:  Nontraumatic complete tear of right rotator cuff [M75.121]  Chronic pain in right shoulder [M25.511, G89.29]        Treatment Diagnosis:   R UE stiffness, pain, weakness  Date: 2024  [x]  Progress Note                []  Discharge Note    Evaluation Date:  24  Total Visits to date:   28 Cancels/No-shows to date:  0    Subjective:  Madiha reports 0/10 pain upon arrival although still notes slight soreness with activity. Pt has been doing well at home and within the community. Pt states she has been improving with reaching/overhead activities and chores that require a good amount of strength, such as sweeping and cleaning the shower. Pt still feels her strength isn't comparable to the L UE with flexion and ER, and has the most difficulty with reaching behind her back.     Plan of Care/Treatment to date:  [x] Therapeutic Exercise    [x] Modalities:  [x] Therapeutic Activity     [] Ultrasound  [] Electrical Stimulation  [] Gait Training      [] Cervical Traction   [] Lumbar Traction  [x] Neuromuscular Re-education  [x] Cold/hotpack [] Iontophoresis  [x] Instruction in HEP      Other:  [x] Manual Therapy       []  Vasopneumatic  [] Aquatic Therapy       []   Dry Needle Therapy                      Objective/Significant Findings At Last Visit/Comments:    Palpation: Proximal biceps tendon tenderness     R shoulder A/PROM  Flex: 132 deg/146 deg  Abduction: 104 deg/129 deg  ER: 66 deg/70 deg; functionally at C3  IR: 70 deg/72 deg; functionally at just above PSIS     MMT:  Flex: 4/5  Abd: 5/5  ER: 4+/5  IR: 5/5     Quick DASH: 14% disability      MicroFET  L scaption: 10.5#  R scaption: 7.0#     L ER in

## 2024-07-31 SDOH — ECONOMIC STABILITY: FOOD INSECURITY: WITHIN THE PAST 12 MONTHS, THE FOOD YOU BOUGHT JUST DIDN'T LAST AND YOU DIDN'T HAVE MONEY TO GET MORE.: NEVER TRUE

## 2024-07-31 SDOH — ECONOMIC STABILITY: FOOD INSECURITY: WITHIN THE PAST 12 MONTHS, YOU WORRIED THAT YOUR FOOD WOULD RUN OUT BEFORE YOU GOT MONEY TO BUY MORE.: NEVER TRUE

## 2024-07-31 SDOH — ECONOMIC STABILITY: TRANSPORTATION INSECURITY
IN THE PAST 12 MONTHS, HAS LACK OF TRANSPORTATION KEPT YOU FROM MEETINGS, WORK, OR FROM GETTING THINGS NEEDED FOR DAILY LIVING?: NO

## 2024-07-31 SDOH — ECONOMIC STABILITY: HOUSING INSECURITY
IN THE LAST 12 MONTHS, WAS THERE A TIME WHEN YOU DID NOT HAVE A STEADY PLACE TO SLEEP OR SLEPT IN A SHELTER (INCLUDING NOW)?: NO

## 2024-07-31 SDOH — HEALTH STABILITY: PHYSICAL HEALTH: ON AVERAGE, HOW MANY MINUTES DO YOU ENGAGE IN EXERCISE AT THIS LEVEL?: 30 MIN

## 2024-07-31 SDOH — ECONOMIC STABILITY: INCOME INSECURITY: HOW HARD IS IT FOR YOU TO PAY FOR THE VERY BASICS LIKE FOOD, HOUSING, MEDICAL CARE, AND HEATING?: NOT HARD AT ALL

## 2024-07-31 SDOH — HEALTH STABILITY: PHYSICAL HEALTH: ON AVERAGE, HOW MANY DAYS PER WEEK DO YOU ENGAGE IN MODERATE TO STRENUOUS EXERCISE (LIKE A BRISK WALK)?: 2 DAYS

## 2024-07-31 ASSESSMENT — PATIENT HEALTH QUESTIONNAIRE - PHQ9
SUM OF ALL RESPONSES TO PHQ QUESTIONS 1-9: 0
SUM OF ALL RESPONSES TO PHQ QUESTIONS 1-9: 0
1. LITTLE INTEREST OR PLEASURE IN DOING THINGS: NOT AT ALL
SUM OF ALL RESPONSES TO PHQ9 QUESTIONS 1 & 2: 0
SUM OF ALL RESPONSES TO PHQ QUESTIONS 1-9: 0
SUM OF ALL RESPONSES TO PHQ QUESTIONS 1-9: 0
2. FEELING DOWN, DEPRESSED OR HOPELESS: NOT AT ALL

## 2024-07-31 ASSESSMENT — LIFESTYLE VARIABLES
HOW OFTEN DO YOU HAVE SIX OR MORE DRINKS ON ONE OCCASION: 1
HOW MANY STANDARD DRINKS CONTAINING ALCOHOL DO YOU HAVE ON A TYPICAL DAY: 0
HOW OFTEN DO YOU HAVE A DRINK CONTAINING ALCOHOL: 1
HOW OFTEN DO YOU HAVE A DRINK CONTAINING ALCOHOL: NEVER
HOW MANY STANDARD DRINKS CONTAINING ALCOHOL DO YOU HAVE ON A TYPICAL DAY: PATIENT DOES NOT DRINK

## 2024-08-01 ENCOUNTER — OFFICE VISIT (OUTPATIENT)
Dept: FAMILY MEDICINE CLINIC | Age: 74
End: 2024-08-01
Payer: MEDICARE

## 2024-08-01 VITALS
HEART RATE: 75 BPM | HEIGHT: 62 IN | BODY MASS INDEX: 28.05 KG/M2 | DIASTOLIC BLOOD PRESSURE: 78 MMHG | SYSTOLIC BLOOD PRESSURE: 136 MMHG | WEIGHT: 152.4 LBS | OXYGEN SATURATION: 99 %

## 2024-08-01 DIAGNOSIS — Z00.00 MEDICARE ANNUAL WELLNESS VISIT, SUBSEQUENT: Primary | ICD-10-CM

## 2024-08-01 PROCEDURE — 3017F COLORECTAL CA SCREEN DOC REV: CPT | Performed by: STUDENT IN AN ORGANIZED HEALTH CARE EDUCATION/TRAINING PROGRAM

## 2024-08-01 PROCEDURE — 1123F ACP DISCUSS/DSCN MKR DOCD: CPT | Performed by: STUDENT IN AN ORGANIZED HEALTH CARE EDUCATION/TRAINING PROGRAM

## 2024-08-01 PROCEDURE — 3078F DIAST BP <80 MM HG: CPT | Performed by: STUDENT IN AN ORGANIZED HEALTH CARE EDUCATION/TRAINING PROGRAM

## 2024-08-01 PROCEDURE — G0439 PPPS, SUBSEQ VISIT: HCPCS | Performed by: STUDENT IN AN ORGANIZED HEALTH CARE EDUCATION/TRAINING PROGRAM

## 2024-08-01 PROCEDURE — 3075F SYST BP GE 130 - 139MM HG: CPT | Performed by: STUDENT IN AN ORGANIZED HEALTH CARE EDUCATION/TRAINING PROGRAM

## 2024-08-01 RX ORDER — ASPIRIN 81 MG/1
81 TABLET ORAL DAILY
COMMUNITY

## 2024-08-01 SDOH — ECONOMIC STABILITY: FOOD INSECURITY: WITHIN THE PAST 12 MONTHS, YOU WORRIED THAT YOUR FOOD WOULD RUN OUT BEFORE YOU GOT MONEY TO BUY MORE.: NEVER TRUE

## 2024-08-01 SDOH — ECONOMIC STABILITY: FOOD INSECURITY: WITHIN THE PAST 12 MONTHS, THE FOOD YOU BOUGHT JUST DIDN'T LAST AND YOU DIDN'T HAVE MONEY TO GET MORE.: NEVER TRUE

## 2024-08-01 SDOH — ECONOMIC STABILITY: INCOME INSECURITY: HOW HARD IS IT FOR YOU TO PAY FOR THE VERY BASICS LIKE FOOD, HOUSING, MEDICAL CARE, AND HEATING?: NOT HARD AT ALL

## 2024-08-01 ASSESSMENT — PATIENT HEALTH QUESTIONNAIRE - PHQ9
SUM OF ALL RESPONSES TO PHQ9 QUESTIONS 1 & 2: 0
1. LITTLE INTEREST OR PLEASURE IN DOING THINGS: NOT AT ALL
SUM OF ALL RESPONSES TO PHQ QUESTIONS 1-9: 0
SUM OF ALL RESPONSES TO PHQ QUESTIONS 1-9: 0
2. FEELING DOWN, DEPRESSED OR HOPELESS: NOT AT ALL
SUM OF ALL RESPONSES TO PHQ QUESTIONS 1-9: 0
SUM OF ALL RESPONSES TO PHQ QUESTIONS 1-9: 0

## 2024-08-01 ASSESSMENT — LIFESTYLE VARIABLES
HOW OFTEN DO YOU HAVE A DRINK CONTAINING ALCOHOL: NEVER
HOW MANY STANDARD DRINKS CONTAINING ALCOHOL DO YOU HAVE ON A TYPICAL DAY: PATIENT DOES NOT DRINK

## 2024-08-01 NOTE — PATIENT INSTRUCTIONS
Learning About Being Active as an Older Adult  Why is being active important as you get older?     Being active is one of the best things you can do for your health. And it's never too late to start. Being active--or getting active, if you aren't already--has definite benefits. It can:  Give you more energy,  Keep your mind sharp.  Improve balance to reduce your risk of falls.  Help you manage chronic illness with fewer medicines.  No matter how old you are, how fit you are, or what health problems you have, there is a form of activity that will work for you. And the more physical activity you can do, the better your overall health will be.  What kinds of activity can help you stay healthy?  Being more active will make your daily activities easier. Physical activity includes planned exercise and things you do in daily life. There are four types of activity:  Aerobic.  Doing aerobic activity makes your heart and lungs strong.  Includes walking, dancing, and gardening.  Aim for at least 2½ hours spread throughout the week.  It improves your energy and can help you sleep better.  Muscle-strengthening.  This type of activity can help maintain muscle and strengthen bones.  Includes climbing stairs, using resistance bands, and lifting or carrying heavy loads.  Aim for at least twice a week.  It can help protect the knees and other joints.  Stretching.  Stretching gives you better range of motion in joints and muscles.  Includes upper arm stretches, calf stretches, and gentle yoga.  Aim for at least twice a week, preferably after your muscles are warmed up from other activities.  It can help you function better in daily life.  Balancing.  This helps you stay coordinated and have good posture.  Includes heel-to-toe walking, alex chi, and certain types of yoga.  Aim for at least 3 days a week.  It can reduce your risk of falling.  Even if you have a hard time meeting the recommendations, it's better to be more active

## 2024-08-01 NOTE — PROGRESS NOTES
Medicare Annual Wellness Visit    Madiha Martin is here for Medicare AWV    Assessment & Plan   Medicare annual wellness visit, subsequent  Recommendations for Preventive Services Due: see orders and patient instructions/AVS.  Recommended screening schedule for the next 5-10 years is provided to the patient in written form: see Patient Instructions/AVS.     No follow-ups on file.     Subjective       Patient's complete Health Risk Assessment and screening values have been reviewed and are found in Flowsheets. The following problems were reviewed today and where indicated follow up appointments were made and/or referrals ordered.    Positive Risk Factor Screenings with Interventions:                Inactivity:  On average, how many days per week do you engage in moderate to strenuous exercise (like a brisk walk)?: 2 days (!) Abnormal  On average, how many minutes do you engage in exercise at this level?: 30 min  Interventions:  See AVS for additional education material        Vision Screen:  Do you have difficulty driving, watching TV, or doing any of your daily activities because of your eyesight?: (!) Yes (has cataracts)  Have you had an eye exam within the past year?: Yes  Interventions:   Patient comments: patient states she had to cancel her appointment to have her cataracts removed due to her right shoulder surgery in 3/2024. She is going to reschedule once she has completed her physical therapy, and has 8 more sessions.  See AVS for additional education material                    Objective   Vitals:    08/01/24 1400   BP: 136/78   Site: Left Upper Arm   Position: Sitting   Cuff Size: Medium Adult   Pulse: 75   SpO2: 99%   Weight: 69.1 kg (152 lb 6.4 oz)   Height: 1.581 m (5' 2.25\")      Body mass index is 27.65 kg/m².                    Allergies   Allergen Reactions    Clindamycin/Lincomycin Swelling     Lips and tongue    Gabapentin Nausea Only and Other (See Comments)     Dizziness/nausea    Pregabalin

## 2024-08-02 ENCOUNTER — HOSPITAL ENCOUNTER (OUTPATIENT)
Dept: PHYSICAL THERAPY | Age: 74
Setting detail: THERAPIES SERIES
Discharge: HOME | End: 2024-08-02
Payer: MEDICARE

## 2024-08-02 PROCEDURE — 97140 MANUAL THERAPY 1/> REGIONS: CPT

## 2024-08-02 PROCEDURE — 97530 THERAPEUTIC ACTIVITIES: CPT

## 2024-08-02 PROCEDURE — 97110 THERAPEUTIC EXERCISES: CPT

## 2024-08-02 NOTE — FLOWSHEET NOTE
A/punch 2# 2x10 3# 2x10  3# 2x10   Supine AA/ER,IR scap plane       Supine circles  3# 10x2 each direction 3# 10x2 each direction     Supine flex ARC       Supine AAROM flexion with cane        S/L ER with towel roll 2#10x2 2#10x2  3# 2x10   S/L abd scap plane       Sleeper stretch        Supine shoulder flexion 10x2 2# 10x2 2#  3# 2x8   S/L shoulder abd 10x1 3#  10x1 2# 10x2 3#     STANDING       *elbow flex/ext  4# 10x2 4# 10x2  5# 2x10   *pendulums        Iso shoulder flexion        SB slides/circles        OH ball taps  20x2 with yellow ball 20x2 with yellow ball 20x2 with 2# ball 2x20 with red ball   Bar around waist        Single arm row          Shoulder punches        Mid rows        Ball on Wall Flexion Stretch       Internal rotation towel stretch (horizontal)   10x2 3\" hold    Roller on wall  10x2 flex and scap 10x2 flex and scap 10x2 flex and scap    Pulldowns        Scaption on tall table       Shoulder circles on wall with ball    2x10 ea dir with red ball   Across body wall taps       UE cone grabs on counter 5x5 in flex and abduction with 2# wrist wt 5x7 in flex and abduction ARC with 2# wrist wt 10x4 in flex and abd ARC with 2# wrist wt    CC shoulder flex stretch        Bicep stretch against counter       FM single arm pull        Tennis ball behind head/waist 10x2 each direction 10x2 each direction  2x10 ea dir with 2# weight   External rotation stretch (horizontal) 10x1 with 5\" hold with PVC pipe 10x1 with 5\" hold with PVC pipe     Resisted IR 10x2 GTB 10x2 GTB  2x15 GTB   Resisted ER 10x2 GTB 10x2 GTB  2x15 GTB   Reaching overhead placing cones on/off plyo boxes  5 cones x 3 on/off  3 cones x20 with lowered taps   Flexion raises off table   2# 10x2    Shoulder ER   2# 10x2 with towel roll    Body blade at 90 flexion    3x20\" FWD/BWD   D2 shoulder flexion       Scaption raises    2x10 2#          MODALITIES       Vaso (Billable CPT code)  Ice bag to go Ice bag to go                Other

## 2024-08-06 ENCOUNTER — HOSPITAL ENCOUNTER (OUTPATIENT)
Dept: PHYSICAL THERAPY | Age: 74
Setting detail: THERAPIES SERIES
Discharge: HOME | End: 2024-08-06
Payer: MEDICARE

## 2024-08-06 PROCEDURE — 97140 MANUAL THERAPY 1/> REGIONS: CPT

## 2024-08-06 PROCEDURE — 97530 THERAPEUTIC ACTIVITIES: CPT

## 2024-08-06 PROCEDURE — 97110 THERAPEUTIC EXERCISES: CPT

## 2024-08-06 NOTE — FLOWSHEET NOTE
circles      Supine flex ARC     Supine AAROM flexion with cane      S/L ER with towel roll 3# 2x10    S/L abd scap plane     Sleeper stretch      Supine shoulder flexion 3# 2x8    S/L shoulder abd     STANDING     *elbow flex/ext  5# 2x10    *pendulums      Iso shoulder flexion      SB slides/circles      OH ball taps  2x20 with red ball 10x2 2# ball   Bar around waist      Single arm row        Shoulder punches   GTB 10x2   Mid rows      Ball on Wall Flexion Stretch  10x2 each dir    Internal rotation towel stretch (horizontal)     Roller on wall   2# 10x2   Pulldowns   GTB 10x2   Scaption on tall table     Shoulder circles on wall with ball 2x10 ea dir with red ball    Across body wall taps     UE cone grabs on counter     CC shoulder flex stretch   10\"  5x2   Bicep stretch against counter     FM single arm pull   13# 10x2   Tennis ball behind head/waist 2x10 ea dir with 2# weight    External rotation stretch (horizontal)     Resisted IR 2x15 GTB    Resisted ER 2x15 GTB Walkout 5x2  GTB    Reaching overhead placing cones on/off plyo boxes 3 cones x20 with lowered taps    Flexion raises off table     Shoulder ER     Body blade at 90 flexion 3x20\" FWD/BWD    D2 shoulder flexion     Scaption raises 2x10 2# 2# 10x2   Push ups on tall table   10x2   MODALITIES     Vaso (Billable CPT code)               Other Therapeutic Activities/Education:  --      Home Exercise Program:  *HO issued, reviewed and discussed with patient. All questions answered. Pt agreed to comply.    Added resisted punches and mid rows YTB 5/24/24.       Manual Treatments: PROM shoulder for flex/scaption/ER/IR to end ranges x8'       Modalities:     Communication with other providers:  POC sent 4/16/24   PN 5/22/24  PN 7/30/24      Assessment: Pt tolerated session well without reports of increased pain or discomfort from start of session to finish. Soreness against resistance with fatigue and min reduced ROM with increasing effort. Passively with

## 2024-08-07 ENCOUNTER — OFFICE VISIT (OUTPATIENT)
Dept: FAMILY MEDICINE CLINIC | Age: 74
End: 2024-08-07
Payer: MEDICARE

## 2024-08-07 VITALS
RESPIRATION RATE: 18 BRPM | WEIGHT: 153.1 LBS | SYSTOLIC BLOOD PRESSURE: 130 MMHG | BODY MASS INDEX: 28.17 KG/M2 | HEART RATE: 68 BPM | HEIGHT: 62 IN | OXYGEN SATURATION: 99 % | DIASTOLIC BLOOD PRESSURE: 64 MMHG

## 2024-08-07 DIAGNOSIS — I10 BENIGN ESSENTIAL HTN: ICD-10-CM

## 2024-08-07 DIAGNOSIS — Z13.29 THYROID DISORDER SCREEN: ICD-10-CM

## 2024-08-07 DIAGNOSIS — K58.9 IRRITABLE BOWEL SYNDROME, UNSPECIFIED TYPE: ICD-10-CM

## 2024-08-07 DIAGNOSIS — M47.896 OTHER OSTEOARTHRITIS OF SPINE, LUMBAR REGION: ICD-10-CM

## 2024-08-07 DIAGNOSIS — K21.9 GASTROESOPHAGEAL REFLUX DISEASE WITHOUT ESOPHAGITIS: ICD-10-CM

## 2024-08-07 DIAGNOSIS — E78.00 PURE HYPERCHOLESTEROLEMIA: ICD-10-CM

## 2024-08-07 DIAGNOSIS — F41.1 ANXIETY STATE: ICD-10-CM

## 2024-08-07 PROCEDURE — 3078F DIAST BP <80 MM HG: CPT | Performed by: FAMILY MEDICINE

## 2024-08-07 PROCEDURE — 1036F TOBACCO NON-USER: CPT | Performed by: FAMILY MEDICINE

## 2024-08-07 PROCEDURE — 1090F PRES/ABSN URINE INCON ASSESS: CPT | Performed by: FAMILY MEDICINE

## 2024-08-07 PROCEDURE — 3075F SYST BP GE 130 - 139MM HG: CPT | Performed by: FAMILY MEDICINE

## 2024-08-07 PROCEDURE — 3017F COLORECTAL CA SCREEN DOC REV: CPT | Performed by: FAMILY MEDICINE

## 2024-08-07 PROCEDURE — G8400 PT W/DXA NO RESULTS DOC: HCPCS | Performed by: FAMILY MEDICINE

## 2024-08-07 PROCEDURE — G8427 DOCREV CUR MEDS BY ELIG CLIN: HCPCS | Performed by: FAMILY MEDICINE

## 2024-08-07 PROCEDURE — 1123F ACP DISCUSS/DSCN MKR DOCD: CPT | Performed by: FAMILY MEDICINE

## 2024-08-07 PROCEDURE — G8417 CALC BMI ABV UP PARAM F/U: HCPCS | Performed by: FAMILY MEDICINE

## 2024-08-07 PROCEDURE — 99214 OFFICE O/P EST MOD 30 MIN: CPT | Performed by: FAMILY MEDICINE

## 2024-08-07 RX ORDER — ATORVASTATIN CALCIUM 20 MG/1
20 TABLET, FILM COATED ORAL DAILY
Qty: 90 TABLET | Refills: 1 | Status: SHIPPED | OUTPATIENT
Start: 2024-08-07

## 2024-08-07 RX ORDER — TRIAMTERENE AND HYDROCHLOROTHIAZIDE 37.5; 25 MG/1; MG/1
TABLET ORAL
Qty: 45 TABLET | Refills: 1 | Status: SHIPPED | OUTPATIENT
Start: 2024-08-07

## 2024-08-07 ASSESSMENT — ENCOUNTER SYMPTOMS
CHEST TIGHTNESS: 0
TROUBLE SWALLOWING: 0
VOMITING: 0
ABDOMINAL PAIN: 0
EYE PAIN: 0
SHORTNESS OF BREATH: 0
WHEEZING: 0
BLOOD IN STOOL: 0
NAUSEA: 0
DIARRHEA: 0

## 2024-08-07 NOTE — PROGRESS NOTES
8/7/2024    Madiha Martin    Chief Complaint   Patient presents with    6 Month Follow-Up     6 month    Shoulder Pain     Had reverse right shoulder placement March 26. Still in physical therapy. Still having issues with mobility of right arm.        HPI  History was obtained from the patient.  Madiha is a 74 y.o. female who presents today with routine recheck.  Patient with history of IBS, anxiety, hyperlipidemia, GERD and varicose veins.  Patient is rehabbing after right reverse shoulder surgery in March of this year.  Doing home PT.  Some increased stress with son who had recent bypass surgery but is currently doing well.  She remains active and pleasant still looks after her elderly mother.  Meds are currently well-tolerated.  On review she is due for refills and screening labs in near future.  Last mammogram was in January of this year.    REVIEW OF SYMPTOMS    Review of Systems   Constitutional:  Negative for activity change and fatigue.   HENT:  Negative for congestion, hearing loss, mouth sores and trouble swallowing.    Eyes:  Negative for pain and visual disturbance.   Respiratory:  Negative for chest tightness, shortness of breath and wheezing.    Cardiovascular:  Negative for chest pain and palpitations.        Patient with hyperlipidemia and varicose veins.   Gastrointestinal:  Negative for abdominal pain, blood in stool, diarrhea, nausea and vomiting.        History of IBS-stable   Endocrine: Negative for polydipsia and polyuria.   Genitourinary:  Negative for dysuria, frequency and urgency.   Musculoskeletal:  Negative for arthralgias, gait problem and neck stiffness.   Skin:  Negative for rash.   Allergic/Immunologic: Negative for environmental allergies.   Neurological:  Negative for dizziness, seizures, speech difficulty and weakness.   Hematological:  Does not bruise/bleed easily.   Psychiatric/Behavioral:  Negative for agitation, confusion, hallucinations, self-injury and suicidal ideas. The

## 2024-08-08 ENCOUNTER — HOSPITAL ENCOUNTER (OUTPATIENT)
Age: 74
Discharge: HOME | End: 2024-08-08
Payer: MEDICARE

## 2024-08-08 DIAGNOSIS — Z13.29 THYROID DISORDER SCREEN: ICD-10-CM

## 2024-08-08 DIAGNOSIS — E78.00 PURE HYPERCHOLESTEROLEMIA: ICD-10-CM

## 2024-08-08 DIAGNOSIS — I10 BENIGN ESSENTIAL HTN: ICD-10-CM

## 2024-08-08 LAB
ALBUMIN SERPL-MCNC: 4.2 GM/DL (ref 3.4–5)
ALP BLD-CCNC: 118 IU/L (ref 40–129)
ALT SERPL-CCNC: 9 U/L (ref 10–40)
ANION GAP SERPL CALCULATED.3IONS-SCNC: 13 MMOL/L (ref 7–16)
AST SERPL-CCNC: 19 IU/L (ref 15–37)
BILIRUB SERPL-MCNC: 0.8 MG/DL (ref 0–1)
BUN SERPL-MCNC: 20 MG/DL (ref 6–23)
CALCIUM SERPL-MCNC: 9.6 MG/DL (ref 8.3–10.6)
CHLORIDE BLD-SCNC: 102 MMOL/L (ref 99–110)
CHOLEST SERPL-MCNC: 156 MG/DL
CO2: 25 MMOL/L (ref 21–32)
CREAT SERPL-MCNC: 1.3 MG/DL (ref 0.6–1.1)
GFR, ESTIMATED: 43 ML/MIN/1.73M2
GLUCOSE SERPL-MCNC: 88 MG/DL (ref 70–99)
HCT VFR BLD CALC: 36.1 % (ref 37–47)
HDLC SERPL-MCNC: 77 MG/DL
HEMOGLOBIN: 11.7 GM/DL (ref 12.5–16)
LDLC SERPL CALC-MCNC: 59 MG/DL
MCH RBC QN AUTO: 30.7 PG (ref 27–31)
MCHC RBC AUTO-ENTMCNC: 32.4 % (ref 32–36)
PDW BLD-RTO: 13 % (ref 11.7–14.9)
PLATELET # BLD: 188 K/CU MM (ref 140–440)
PMV BLD AUTO: 11.1 FL (ref 7.5–11.1)
POTASSIUM SERPL-SCNC: 4.4 MMOL/L (ref 3.5–5.1)
RBC # BLD: 3.81 M/CU MM (ref 4.2–5.4)
SODIUM BLD-SCNC: 140 MMOL/L (ref 135–145)
TOTAL PROTEIN: 6.3 GM/DL (ref 6.4–8.2)
TRIGL SERPL-MCNC: 98 MG/DL
TSH SERPL DL<=0.005 MIU/L-ACNC: 2.66 UIU/ML (ref 0.27–4.2)
WBC # BLD: 5.3 K/CU MM (ref 4–10.5)

## 2024-08-08 PROCEDURE — 36415 COLL VENOUS BLD VENIPUNCTURE: CPT

## 2024-08-08 PROCEDURE — 80053 COMPREHEN METABOLIC PANEL: CPT

## 2024-08-08 PROCEDURE — 84443 ASSAY THYROID STIM HORMONE: CPT

## 2024-08-08 PROCEDURE — 85027 COMPLETE CBC AUTOMATED: CPT

## 2024-08-08 PROCEDURE — 80061 LIPID PANEL: CPT

## 2024-08-09 DIAGNOSIS — N18.30 STAGE 3 CHRONIC KIDNEY DISEASE, UNSPECIFIED WHETHER STAGE 3A OR 3B CKD (HCC): Primary | ICD-10-CM

## 2024-08-13 ENCOUNTER — HOSPITAL ENCOUNTER (OUTPATIENT)
Dept: PHYSICAL THERAPY | Age: 74
Setting detail: THERAPIES SERIES
Discharge: HOME | End: 2024-08-13
Payer: MEDICARE

## 2024-08-13 PROCEDURE — 97140 MANUAL THERAPY 1/> REGIONS: CPT

## 2024-08-13 PROCEDURE — 97110 THERAPEUTIC EXERCISES: CPT

## 2024-08-13 NOTE — FLOWSHEET NOTE
PRN      Time In / Time Out:   1030 / 1110      Timed Code/Total Treatment Minutes:  40'/40'  1 Man   2 TE   KX Modifier      Next Progress Note due: Eval 4/16/24     PN 5/22/24     PN 7/30/24      Plan of Care Interventions:  [x] Therapeutic Exercise  [x] Modalities:  [x] Therapeutic Activity     [] Ultrasound  [] Estim  [] Gait Training      [] Cervical Traction [] Lumbar Traction  [x] Neuromuscular Re-education    [x] Cold/hotpack [] Iontophoresis   [x] Instruction in HEP      [x] Vasopneumatic   [] Dry Needling    [x] Manual Therapy               [] Aquatic Therapy              Electronically signed by:   Riddhi Willett PTA, BSPTA 8/13/2024 10:35 AM

## 2024-08-15 ENCOUNTER — HOSPITAL ENCOUNTER (OUTPATIENT)
Dept: PHYSICAL THERAPY | Age: 74
Setting detail: THERAPIES SERIES
Discharge: HOME | End: 2024-08-15
Payer: MEDICARE

## 2024-08-15 PROCEDURE — 97140 MANUAL THERAPY 1/> REGIONS: CPT

## 2024-08-15 PROCEDURE — 97110 THERAPEUTIC EXERCISES: CPT

## 2024-08-18 ASSESSMENT — PROMIS GLOBAL HEALTH SCALE
IN THE PAST 7 DAYS, HOW WOULD YOU RATE YOUR FATIGUE ON AVERAGE [ON A SCALE FROM 1 (NONE) TO 5 (VERY SEVERE)]?: MILD
TO WHAT EXTENT ARE YOU ABLE TO CARRY OUT YOUR EVERYDAY PHYSICAL ACTIVITIES SUCH AS WALKING, CLIMBING STAIRS, CARRYING GROCERIES, OR MOVING A CHAIR [ON A SCALE OF 1 (NOT AT ALL) TO 5 (COMPLETELY)]?: MOSTLY
IN THE PAST 7 DAYS, HOW WOULD YOU RATE YOUR PAIN ON AVERAGE [ON A SCALE FROM 0 (NO PAIN) TO 10 (WORST IMAGINABLE PAIN)]?: 2
IN GENERAL, HOW WOULD YOU RATE YOUR SATISFACTION WITH YOUR SOCIAL ACTIVITIES AND RELATIONSHIPS [ON A SCALE OF 1 (POOR) TO 5 (EXCELLENT)]?: VERY GOOD
WHO IS THE PERSON COMPLETING THE PROMIS V1.1 SURVEY?: SELF
TO WHAT EXTENT ARE YOU ABLE TO CARRY OUT YOUR EVERYDAY PHYSICAL ACTIVITIES SUCH AS WALKING, CLIMBING STAIRS, CARRYING GROCERIES, OR MOVING A CHAIR [ON A SCALE OF 1 (NOT AT ALL) TO 5 (COMPLETELY)]?: MOSTLY
HOW IS THE PROMIS V1.1 BEING ADMINISTERED?: ELECTRONIC
IN THE PAST 7 DAYS, HOW OFTEN HAVE YOU BEEN BOTHERED BY EMOTIONAL PROBLEMS, SUCH AS FEELING ANXIOUS, DEPRESSED, OR IRRITABLE [ON A SCALE FROM 1 (NEVER) TO 5 (ALWAYS)]?: RARELY
HOW IS THE PROMIS V1.1 BEING ADMINISTERED?: ELECTRONIC
IN THE PAST 7 DAYS, HOW WOULD YOU RATE YOUR PAIN ON AVERAGE [ON A SCALE FROM 0 (NO PAIN) TO 10 (WORST IMAGINABLE PAIN)]?: 2
IN GENERAL, PLEASE RATE HOW WELL YOU CARRY OUT YOUR USUAL SOCIAL ACTIVITIES (INCLUDES ACTIVITIES AT HOME, AT WORK, AND IN YOUR COMMUNITY, AND RESPONSIBILITIES AS A PARENT, CHILD, SPOUSE, EMPLOYEE, FRIEND, ETC) [ON A SCALE OF 1 (POOR) TO 5 (EXCELLENT)]?: VERY GOOD
IN GENERAL, HOW WOULD YOU RATE YOUR MENTAL HEALTH, INCLUDING YOUR MOOD AND YOUR ABILITY TO THINK [ON A SCALE OF 1 (POOR) TO 5 (EXCELLENT)]?: VERY GOOD
IN GENERAL, WOULD YOU SAY YOUR HEALTH IS...[ON A SCALE OF 1 (POOR) TO 5 (EXCELLENT)]: GOOD
IN GENERAL, HOW WOULD YOU RATE YOUR PHYSICAL HEALTH [ON A SCALE OF 1 (POOR) TO 5 (EXCELLENT)]?: GOOD
SUM OF RESPONSES TO QUESTIONS 3, 6, 7, & 8: 13
IN THE PAST 7 DAYS, HOW WOULD YOU RATE YOUR FATIGUE ON AVERAGE [ON A SCALE FROM 1 (NONE) TO 5 (VERY SEVERE)]?: MILD
IN THE PAST 7 DAYS, HOW OFTEN HAVE YOU BEEN BOTHERED BY EMOTIONAL PROBLEMS, SUCH AS FEELING ANXIOUS, DEPRESSED, OR IRRITABLE [ON A SCALE FROM 1 (NEVER) TO 5 (ALWAYS)]?: RARELY
IN GENERAL, WOULD YOU SAY YOUR QUALITY OF LIFE IS...[ON A SCALE OF 1 (POOR) TO 5 (EXCELLENT)]: VERY GOOD
SUM OF RESPONSES TO QUESTIONS 2, 4, 5, & 10: 16
IN GENERAL, PLEASE RATE HOW WELL YOU CARRY OUT YOUR USUAL SOCIAL ACTIVITIES (INCLUDES ACTIVITIES AT HOME, AT WORK, AND IN YOUR COMMUNITY, AND RESPONSIBILITIES AS A PARENT, CHILD, SPOUSE, EMPLOYEE, FRIEND, ETC) [ON A SCALE OF 1 (POOR) TO 5 (EXCELLENT)]?: VERY GOOD
WHO IS THE PERSON COMPLETING THE PROMIS V1.1 SURVEY?: SELF

## 2024-08-20 ENCOUNTER — HOSPITAL ENCOUNTER (OUTPATIENT)
Dept: PHYSICAL THERAPY | Age: 74
Setting detail: THERAPIES SERIES
Discharge: HOME | End: 2024-08-20
Payer: MEDICARE

## 2024-08-20 PROCEDURE — 97530 THERAPEUTIC ACTIVITIES: CPT

## 2024-08-20 PROCEDURE — 97110 THERAPEUTIC EXERCISES: CPT

## 2024-08-20 NOTE — FLOWSHEET NOTE
Outpatient Physical Therapy  [place]           [x] Phone: [phone]   Fax: [phone]  [place]           [] Phone: [phone]   Fax: [phone]        Physical Therapy Daily Treatment Note  Date:  2024    Patient Name:  Madiha Martin    :  1950  MRN: [id number]  Restrictions/Precautions:   Sling for 6 weeks, no active resistance to bicep till 6 weeks. ER to 30 deg, scaption to 120 deg till 6 weeks.   Diagnosis: Nontraumatic complete tear of right rotator cuff [M75.121]    s/p R Reverse TSA   Chronic pain in right shoulder [M25.511, G89.29]   Date of Injury/Surgery: 3/24/24    Treatment Diagnosis:  R UE stiffness, pain, weakness  Insurance/Certification information: [employer]       Pt approved for 10 more visits  for a total of 40      [id number]  only     Til  24     Auth# [id number]        Referring Physician:  Maximo Mary DO   PCP: Jake Dumont MD  Next Doctor Visit:    Plan of care signed (Y/N):  Yes  Outcome Measure: Quick DASH: 86% disability   Visit# / total visits: 33/40  Pain level:   1-210     Goals:     Patient goals: full use to return to PLOF  Short term goals  Time Frame for Short term goals: Defer to Long Term Goals    Long Term Goals Completed by 8 weeks 24       Long Term Goals: 6 weeks   Long Term Goal 1: Pt will demo I with HEP/symptom management. Progressing  Long Term Goal 2: Pt will demo able to reach overhead with no increase in pain to ease overhead reaching.  Not MET   Long Term Goal 3: Pt will demo >20 deg improvement in R UE A/PROM to ADLs. MET   Long Term Goal 4: Pt will demo able to reach behind head to complete hair management with min/no increase in pain. Partially MET   Long Term Goal 5: Pt will demo <40% disability per quick DASH to demo improved UE mobility. MET   Long Term Goal 6: Pt will demo >4/5 R UE strength within available motion to ease light lifting. MET with all except R shoulder

## 2024-08-21 ENCOUNTER — OFFICE VISIT (OUTPATIENT)
Dept: ORTHOPEDIC SURGERY | Age: 74
End: 2024-08-21
Payer: MEDICARE

## 2024-08-21 VITALS — OXYGEN SATURATION: 99 % | HEIGHT: 62 IN | WEIGHT: 152 LBS | HEART RATE: 64 BPM | BODY MASS INDEX: 27.97 KG/M2

## 2024-08-21 DIAGNOSIS — Z09 POSTOPERATIVE EXAMINATION: Primary | ICD-10-CM

## 2024-08-21 PROCEDURE — 1036F TOBACCO NON-USER: CPT | Performed by: STUDENT IN AN ORGANIZED HEALTH CARE EDUCATION/TRAINING PROGRAM

## 2024-08-21 PROCEDURE — 1090F PRES/ABSN URINE INCON ASSESS: CPT | Performed by: STUDENT IN AN ORGANIZED HEALTH CARE EDUCATION/TRAINING PROGRAM

## 2024-08-21 PROCEDURE — G8417 CALC BMI ABV UP PARAM F/U: HCPCS | Performed by: STUDENT IN AN ORGANIZED HEALTH CARE EDUCATION/TRAINING PROGRAM

## 2024-08-21 PROCEDURE — 1123F ACP DISCUSS/DSCN MKR DOCD: CPT | Performed by: STUDENT IN AN ORGANIZED HEALTH CARE EDUCATION/TRAINING PROGRAM

## 2024-08-21 PROCEDURE — 99213 OFFICE O/P EST LOW 20 MIN: CPT | Performed by: STUDENT IN AN ORGANIZED HEALTH CARE EDUCATION/TRAINING PROGRAM

## 2024-08-21 PROCEDURE — 3017F COLORECTAL CA SCREEN DOC REV: CPT | Performed by: STUDENT IN AN ORGANIZED HEALTH CARE EDUCATION/TRAINING PROGRAM

## 2024-08-21 PROCEDURE — G8427 DOCREV CUR MEDS BY ELIG CLIN: HCPCS | Performed by: STUDENT IN AN ORGANIZED HEALTH CARE EDUCATION/TRAINING PROGRAM

## 2024-08-21 PROCEDURE — G8400 PT W/DXA NO RESULTS DOC: HCPCS | Performed by: STUDENT IN AN ORGANIZED HEALTH CARE EDUCATION/TRAINING PROGRAM

## 2024-08-21 ASSESSMENT — ENCOUNTER SYMPTOMS
EYE PAIN: 0
BACK PAIN: 0
EYE REDNESS: 0
NAUSEA: 0
ABDOMINAL PAIN: 0
VOMITING: 0
SHORTNESS OF BREATH: 0
COLOR CHANGE: 0
COUGH: 0
PHOTOPHOBIA: 0
WHEEZING: 0
STRIDOR: 0
FACIAL SWELLING: 0

## 2024-08-21 NOTE — PATIENT INSTRUCTIONS
Call to schedule 1 year follow up in March of 2024.  Appointment scheduled for Right wrist  Continue exercises per physical therapy.

## 2024-08-21 NOTE — PROGRESS NOTES
8/21/2024   Chief Complaint   Patient presents with    Post-Op Check     Right reverse total shoulder        History of Present Illness:                             Madiha Martin is a 74 y.o. female   Date of surgery: 3-     Procedure:   Right reverse total shoulder arthroplasty (53589) without a subscapularis repair   Right shoulder open biceps tenodesis (20686)      History:  Madiha is here in follow up regarding their 5 month postop appointment for above procedure    Patient is doing well. They have minimally worsened 2/10 pain.  Patient had a recent fall injuring her right wrist and had some increased soreness in the shoulder but no significant issue there.  They deny chest pain, SOB, calf pain,fever,wound drainage.  No other issues or injuries.  Patient denies any constitutional symptoms.    Patient states they have been compliant with restrictions.    Continues to do physical therapy and states this is going well.      Medical History  Patient's medications, allergies, past medical, surgical, social and family histories were reviewed and updated as appropriate.    Past Medical History:   Diagnosis Date    Anxiety state     Arthritis     Benign essential HTN     Breast lump     Bunion of great toe of right foot     CKD (chronic kidney disease), stage II     Colon polyp 10/31/2014    Family history of early CAD     father had CABG in his 50s and 60s, brother had stroke and MI in his 70s. son had MI in 40s other son stroke at young age    GERD (gastroesophageal reflux disease)     History of nuclear stress test 07/07/2020    EF 71%. Normal study.    Hyperlipidemia     Internal hemorrhoids 10/31/2014    Irritable bowel syndrome     Nausea & vomiting     Osteoarthritis     PONV (postoperative nausea and vomiting)     Rosacea      Past Surgical History:   Procedure Laterality Date    APPENDECTOMY  1963    BICEPS TENDON REPAIR Right 03/26/2024    RIGHT BICEPS TENODESIS performed by Maximo Mary DO

## 2024-08-21 NOTE — PROGRESS NOTES
Pt comes in today for a 5 month follow up s/p RT RTSA on 3/26/24. Last seen in our office on 6/16/24. She reports that she has soreness in her upper shoulder. Pain is 2/10 today. She is currently in PT and struggling with shoulder flexion strength. She reports a fall about 2 weeks ago. She states that the fall did not bother her shoulder but she now has pain in her RT wrist. X-rays were taken today.

## 2024-08-26 ENCOUNTER — OFFICE VISIT (OUTPATIENT)
Dept: ORTHOPEDIC SURGERY | Age: 74
End: 2024-08-26
Payer: MEDICARE

## 2024-08-26 VITALS
WEIGHT: 152 LBS | BODY MASS INDEX: 27.97 KG/M2 | HEIGHT: 62 IN | RESPIRATION RATE: 14 BRPM | HEART RATE: 103 BPM | OXYGEN SATURATION: 97 %

## 2024-08-26 DIAGNOSIS — S63.501A SPRAIN OF RIGHT WRIST, INITIAL ENCOUNTER: Primary | ICD-10-CM

## 2024-08-26 PROCEDURE — 3017F COLORECTAL CA SCREEN DOC REV: CPT | Performed by: PHYSICIAN ASSISTANT

## 2024-08-26 PROCEDURE — G8400 PT W/DXA NO RESULTS DOC: HCPCS | Performed by: PHYSICIAN ASSISTANT

## 2024-08-26 PROCEDURE — G8417 CALC BMI ABV UP PARAM F/U: HCPCS | Performed by: PHYSICIAN ASSISTANT

## 2024-08-26 PROCEDURE — G8427 DOCREV CUR MEDS BY ELIG CLIN: HCPCS | Performed by: PHYSICIAN ASSISTANT

## 2024-08-26 PROCEDURE — 1036F TOBACCO NON-USER: CPT | Performed by: PHYSICIAN ASSISTANT

## 2024-08-26 PROCEDURE — 1090F PRES/ABSN URINE INCON ASSESS: CPT | Performed by: PHYSICIAN ASSISTANT

## 2024-08-26 PROCEDURE — 99214 OFFICE O/P EST MOD 30 MIN: CPT | Performed by: PHYSICIAN ASSISTANT

## 2024-08-26 PROCEDURE — 1123F ACP DISCUSS/DSCN MKR DOCD: CPT | Performed by: PHYSICIAN ASSISTANT

## 2024-08-26 ASSESSMENT — ENCOUNTER SYMPTOMS
RESPIRATORY NEGATIVE: 1
EYES NEGATIVE: 1
GASTROINTESTINAL NEGATIVE: 1

## 2024-08-26 NOTE — PATIENT INSTRUCTIONS
Recommenced using brace and if not better within 4-6 weeks call office for another appointment.  Continue weight-bearing as tolerated.  Continue range of motion exercises as instructed.  Ice and elevate as needed.  Tylenol or Motrin for pain.  Follow up as needed.    We are committed to providing you the best care possible.  If you receive a survey after visiting one of our offices, please take time to share your experience concerning your physician office visit.  These surveys are confidential and no health information about you is shared.  We are eager to improve for you and we are counting on your feedback to help make that happen.

## 2024-08-26 NOTE — PROGRESS NOTES
Patient is in the office to discuss the right wrist about 3-4 weeks ago patient tripped and injured her right wrist. Pain is on the radial side of the wrist. Patient is having difficulty with ROM of the right wrist.    Patient was last seen by Dr. Mary for her Right TSA. Patient denies having pains with the shoulder.  
Dispense Refill    atorvastatin (LIPITOR) 20 MG tablet Take 1 tablet by mouth daily 90 tablet 1    triamterene-hydroCHLOROthiazide (MAXZIDE-25) 37.5-25 MG per tablet TAKE 1/2 TABLET DAILY 45 tablet 1    aspirin 81 MG EC tablet Take 1 tablet by mouth daily      tiZANidine (ZANAFLEX) 2 MG tablet Take 1 tablet by mouth 3 times daily as needed (spasms) 30 tablet 0    isosorbide mononitrate (IMDUR) 30 MG extended release tablet Take 1 tablet by mouth daily 90 tablet 3    nitroGLYCERIN (NITROSTAT) 0.4 MG SL tablet Place 1 tablet under the tongue every 5 minutes as needed for Chest pain 25 tablet 3    Magnesium 400 MG CAPS Take 200 mg by mouth nightly Pt states take .5 only at night      DENTA 5000 PLUS 1.1 % CREA Take 1 Dose by mouth nightly  3    Omega-3 Fatty Acids (FISH OIL) 1000 MG CAPS Take 3 capsules by mouth 2 times daily      Calcium Carbonate-Vitamin D (CALCIUM 600+D HIGH POTENCY PO) Take by mouth      Multiple Vitamins-Minerals (HAIR SKIN AND NAILS FORMULA PO) Take by mouth      simethicone (MYLICON) 80 MG chewable tablet Take 1 tablet by mouth every 6 hours as needed for Flatulence      Lactase (LACTAID PO) Take by mouth as needed        No current facility-administered medications for this visit.       Allergies   Allergen Reactions    Clindamycin/Lincomycin Swelling     Lips and tongue    Gabapentin Nausea Only and Other (See Comments)     Dizziness/nausea    Pregabalin      Visual disturbance    Methylprednisolone Rash     Red face       Vitals:    08/26/24 1358   Pulse: (!) 103   Resp: 14   SpO2: 97%   Weight: 68.9 kg (152 lb)   Height: 1.581 m (5' 2.25\")         Gen/Psych:Examination reveals a pleasant individual in no acute distress. The patient is oriented to time, place and person.  The patient's mood and affect are appropriate.  Patient appears well nourished. Body habitus is normal    Head: Head is atraumatic normocephalic,  ears are symmetric,     Eyes: Eyes show equal pupils bilaterally, extraocular

## 2024-08-27 ENCOUNTER — HOSPITAL ENCOUNTER (OUTPATIENT)
Dept: PHYSICAL THERAPY | Age: 74
Setting detail: THERAPIES SERIES
Discharge: HOME | End: 2024-08-27
Payer: MEDICARE

## 2024-08-27 PROCEDURE — 97110 THERAPEUTIC EXERCISES: CPT

## 2024-08-27 PROCEDURE — 97140 MANUAL THERAPY 1/> REGIONS: CPT

## 2024-08-27 PROCEDURE — 97530 THERAPEUTIC ACTIVITIES: CPT

## 2024-08-27 NOTE — FLOWSHEET NOTE
Outpatient Physical Therapy  [place]           [x] Phone: [phone]   Fax: [phone]  [place]           [] Phone: [phone]   Fax: [phone]        Physical Therapy Daily Treatment Note  Date:  2024    Patient Name:  Madiha Martin    :  1950  MRN: [id number]  Restrictions/Precautions:   Sling for 6 weeks, no active resistance to bicep till 6 weeks. ER to 30 deg, scaption to 120 deg till 6 weeks.   Diagnosis: Nontraumatic complete tear of right rotator cuff [M75.121]    s/p R Reverse TSA   Chronic pain in right shoulder [M25.511, G89.29]   Date of Injury/Surgery: 3/24/24    Treatment Diagnosis:  R UE stiffness, pain, weakness  Insurance/Certification information: [employer]       Pt approved for 10 more visits  for a total of 40      [id number]  only     Til  24     Auth# [id number]        Referring Physician:  Maximo Mary DO   PCP: Jake Dumont MD  Next Doctor Visit:    Plan of care signed (Y/N):  Yes  Outcome Measure: Quick DASH: 86% disability   Visit# / total visits: 34/40  Pain level:   2 10     Goals:     Patient goals: full use to return to PLOF  Short term goals  Time Frame for Short term goals: Defer to Long Term Goals    Long Term Goals Completed by 8 weeks 24       Long Term Goals: 6 weeks   Long Term Goal 1: Pt will demo I with HEP/symptom management. Progressing  Long Term Goal 2: Pt will demo able to reach overhead with no increase in pain to ease overhead reaching.  Not MET   Long Term Goal 3: Pt will demo >20 deg improvement in R UE A/PROM to ADLs. MET   Long Term Goal 4: Pt will demo able to reach behind head to complete hair management with min/no increase in pain. Partially MET   Long Term Goal 5: Pt will demo <40% disability per quick DASH to demo improved UE mobility. MET   Long Term Goal 6: Pt will demo >4/5 R UE strength within available motion to ease light lifting. MET with all except R shoulder  program.  Will continue to benefit from skilled therapy interventions to address remaining impairments, improve mobility and strength and progress toward goal completion while reducing risk for re-injury or further decline.     End of session pain: 0/10       Plan for Next Session: Follow protocol, PROM into scaption to 120, ER to 30 deg in scaption, sub max iso, scap activities. Modalities PRN      Time In / Time Out:  1034- 1116      Timed Code/Total Treatment Minutes:  42/42'   12' TA   10' man,  20' TE   KX Modifier      Next Progress Note due: Eval 4/16/24     PN 5/22/24     PN 7/30/24      Plan of Care Interventions:  [x] Therapeutic Exercise  [x] Modalities:  [x] Therapeutic Activity     [] Ultrasound  [] Estim  [] Gait Training      [] Cervical Traction [] Lumbar Traction  [x] Neuromuscular Re-education    [x] Cold/hotpack [] Iontophoresis   [x] Instruction in HEP      [x] Vasopneumatic   [] Dry Needling    [x] Manual Therapy               [] Aquatic Therapy              Electronically signed by:   Bright Javier PT, DPT, OCS     8/27/2024 8:44 AM

## 2024-09-03 ENCOUNTER — HOSPITAL ENCOUNTER (OUTPATIENT)
Dept: PHYSICAL THERAPY | Age: 74
Setting detail: THERAPIES SERIES
Discharge: HOME | End: 2024-09-03
Payer: MEDICARE

## 2024-09-03 PROCEDURE — 97530 THERAPEUTIC ACTIVITIES: CPT

## 2024-09-03 PROCEDURE — 97140 MANUAL THERAPY 1/> REGIONS: CPT

## 2024-09-03 PROCEDURE — 97110 THERAPEUTIC EXERCISES: CPT

## 2024-09-10 ENCOUNTER — HOSPITAL ENCOUNTER (OUTPATIENT)
Dept: PHYSICAL THERAPY | Age: 74
Setting detail: THERAPIES SERIES
Discharge: HOME | End: 2024-09-10
Payer: MEDICARE

## 2024-09-10 PROCEDURE — 97140 MANUAL THERAPY 1/> REGIONS: CPT

## 2024-09-10 PROCEDURE — 97530 THERAPEUTIC ACTIVITIES: CPT

## 2024-09-10 PROCEDURE — 97110 THERAPEUTIC EXERCISES: CPT

## 2024-09-17 ENCOUNTER — HOSPITAL ENCOUNTER (OUTPATIENT)
Dept: PHYSICAL THERAPY | Age: 74
Setting detail: THERAPIES SERIES
Discharge: HOME | End: 2024-09-17
Payer: MEDICARE

## 2024-09-17 PROCEDURE — 97140 MANUAL THERAPY 1/> REGIONS: CPT

## 2024-09-17 PROCEDURE — 97530 THERAPEUTIC ACTIVITIES: CPT

## 2024-09-17 PROCEDURE — 97110 THERAPEUTIC EXERCISES: CPT

## 2024-09-19 ENCOUNTER — HOSPITAL ENCOUNTER (OUTPATIENT)
Age: 74
Discharge: HOME | End: 2024-09-19
Payer: MEDICARE

## 2024-09-19 DIAGNOSIS — N18.30 STAGE 3 CHRONIC KIDNEY DISEASE, UNSPECIFIED WHETHER STAGE 3A OR 3B CKD (HCC): ICD-10-CM

## 2024-09-19 LAB
ANION GAP SERPL CALCULATED.3IONS-SCNC: 12 MMOL/L (ref 9–17)
BUN SERPL-MCNC: 19 MG/DL (ref 7–20)
CALCIUM SERPL-MCNC: 9.2 MG/DL (ref 8.3–10.6)
CHLORIDE SERPL-SCNC: 102 MMOL/L (ref 99–110)
CO2 SERPL-SCNC: 25 MMOL/L (ref 21–32)
CREAT SERPL-MCNC: 1.2 MG/DL (ref 0.6–1.2)
GFR, ESTIMATED: 42 ML/MIN/1.73M2
GLUCOSE SERPL-MCNC: 80 MG/DL (ref 74–99)
POTASSIUM SERPL-SCNC: 4.3 MMOL/L (ref 3.5–5.1)
SODIUM SERPL-SCNC: 140 MMOL/L (ref 136–145)

## 2024-09-19 PROCEDURE — 36415 COLL VENOUS BLD VENIPUNCTURE: CPT

## 2024-09-19 PROCEDURE — 80048 BASIC METABOLIC PNL TOTAL CA: CPT

## 2024-09-20 PROBLEM — Z09 POSTOPERATIVE EXAMINATION: Status: RESOLVED | Noted: 2024-04-17 | Resolved: 2024-09-20

## 2024-09-23 ENCOUNTER — TELEPHONE (OUTPATIENT)
Dept: FAMILY MEDICINE CLINIC | Age: 74
End: 2024-09-23

## 2024-09-24 ENCOUNTER — HOSPITAL ENCOUNTER (OUTPATIENT)
Dept: PHYSICAL THERAPY | Age: 74
Setting detail: THERAPIES SERIES
Discharge: HOME | End: 2024-09-24
Payer: MEDICARE

## 2024-09-24 PROCEDURE — 97110 THERAPEUTIC EXERCISES: CPT

## 2024-09-24 PROCEDURE — 97530 THERAPEUTIC ACTIVITIES: CPT

## 2024-09-24 PROCEDURE — 97140 MANUAL THERAPY 1/> REGIONS: CPT

## 2024-09-27 ENCOUNTER — HOSPITAL ENCOUNTER (OUTPATIENT)
Dept: PHYSICAL THERAPY | Age: 74
Setting detail: THERAPIES SERIES
Discharge: HOME | End: 2024-09-27
Payer: MEDICARE

## 2024-09-27 PROCEDURE — 97110 THERAPEUTIC EXERCISES: CPT

## 2024-09-27 PROCEDURE — 97140 MANUAL THERAPY 1/> REGIONS: CPT

## 2025-02-02 SDOH — HEALTH STABILITY: PHYSICAL HEALTH: ON AVERAGE, HOW MANY MINUTES DO YOU ENGAGE IN EXERCISE AT THIS LEVEL?: 30 MIN

## 2025-02-03 ENCOUNTER — OFFICE VISIT (OUTPATIENT)
Dept: ORTHOPEDIC SURGERY | Age: 75
End: 2025-02-03
Payer: MEDICARE

## 2025-02-03 VITALS — HEIGHT: 62 IN | HEART RATE: 69 BPM | BODY MASS INDEX: 27.58 KG/M2 | OXYGEN SATURATION: 97 %

## 2025-02-03 DIAGNOSIS — M17.12 ARTHRITIS OF KNEE, LEFT: Primary | ICD-10-CM

## 2025-02-03 PROCEDURE — 20610 DRAIN/INJ JOINT/BURSA W/O US: CPT | Performed by: PHYSICIAN ASSISTANT

## 2025-02-03 PROCEDURE — 99214 OFFICE O/P EST MOD 30 MIN: CPT | Performed by: PHYSICIAN ASSISTANT

## 2025-02-03 PROCEDURE — 1036F TOBACCO NON-USER: CPT | Performed by: PHYSICIAN ASSISTANT

## 2025-02-03 PROCEDURE — 3017F COLORECTAL CA SCREEN DOC REV: CPT | Performed by: PHYSICIAN ASSISTANT

## 2025-02-03 PROCEDURE — 1090F PRES/ABSN URINE INCON ASSESS: CPT | Performed by: PHYSICIAN ASSISTANT

## 2025-02-03 PROCEDURE — 1125F AMNT PAIN NOTED PAIN PRSNT: CPT | Performed by: PHYSICIAN ASSISTANT

## 2025-02-03 PROCEDURE — G8417 CALC BMI ABV UP PARAM F/U: HCPCS | Performed by: PHYSICIAN ASSISTANT

## 2025-02-03 PROCEDURE — G8427 DOCREV CUR MEDS BY ELIG CLIN: HCPCS | Performed by: PHYSICIAN ASSISTANT

## 2025-02-03 PROCEDURE — 1159F MED LIST DOCD IN RCRD: CPT | Performed by: PHYSICIAN ASSISTANT

## 2025-02-03 PROCEDURE — G8400 PT W/DXA NO RESULTS DOC: HCPCS | Performed by: PHYSICIAN ASSISTANT

## 2025-02-03 PROCEDURE — 1123F ACP DISCUSS/DSCN MKR DOCD: CPT | Performed by: PHYSICIAN ASSISTANT

## 2025-02-03 RX ORDER — TRIAMCINOLONE ACETONIDE 40 MG/ML
40 INJECTION, SUSPENSION INTRA-ARTICULAR; INTRAMUSCULAR ONCE
Status: COMPLETED | OUTPATIENT
Start: 2025-02-03 | End: 2025-02-03

## 2025-02-03 RX ADMIN — TRIAMCINOLONE ACETONIDE 40 MG: 40 INJECTION, SUSPENSION INTRA-ARTICULAR; INTRAMUSCULAR at 11:21

## 2025-02-03 ASSESSMENT — ENCOUNTER SYMPTOMS
GASTROINTESTINAL NEGATIVE: 1
RESPIRATORY NEGATIVE: 1
EYES NEGATIVE: 1

## 2025-02-03 NOTE — PATIENT INSTRUCTIONS
Injection in left knee given today.  If no relief within two weeks, please call office to order MRI.  Follow up in 6 weeks.

## 2025-02-03 NOTE — PROGRESS NOTES
Patient presents with left knee pain. Patient states for about 3 weeks she has had constant pain on the lateral side of knee radiating to the back of knee, and up her thigh. States she has been using crutches, applying heat/ice, and taking Tylenol. She states that she has noticed some swelling. Denies any injury or accident. Rates pain today a 6/10.   
extremities     Skin intact in bilateral lower extremities with no ulcerations, lesions, rash, erythema.     Vascular: There are mild varicosities in bilateral lower extremities, sensation grossly intact to light touch over bilateral lower extremities.      Left leg/knee exam:  Leg alignment:     neutral  Quadriceps/hamstring atrophy:   no  Knee effusion:    no   Knee erythema:   no  ROM:     0-110°  Varus laxity at 0 and 30 deg's: no  Valguslaxity at 0 and 30 deg's: no  Recurvatum:    no  Tenderness at:   Maximal tenderness in the medial joint line.    Bilateral Knee strength is 5/5 flexion and extension  There is + L2-S1 motor and sensory function in bilateral lower extremities        4 views of the left knee taken and reviewed in the office today show mild degenerative change in the medial compartment and moderate degenerative changes patellofemoral compartment with small osteophyte formation seen.  No acute fractures, no dislocations, no significant knee joint effusion present.      Impression:     Diagnosis Orders   1. Arthritis of knee, left  triamcinolone acetonide (KENALOG-40) injection 40 mg    DRAIN/INJECT LARGE JOINT/BURSA            Plan:    Patient Instructions   Injection in left knee given today.  If no relief within two weeks, please call office to order MRI.  Follow up in 6 weeks.     Left knee injection procedure note    Pre-procedure diagnosis:  Left knee DJD    Post-procedure diagnosis:  same    Procedure: The planned procedure/risks/benefits/alternatives were discussed with the patient.  Risks include, but are not limited to, increased pain, drug reaction, infection, bleeding, lack of improvement, neurovascular injury, and increased blood sugar levels.  The patient understood and all of their questions were answered.    The Left knee was prepped with alcohol then a 25 gauge needle was advanced into the inferior-medial joint without difficulty.  The joint was then injected with 1 ml 1% lidocaine,

## 2025-02-10 ENCOUNTER — OFFICE VISIT (OUTPATIENT)
Dept: CARDIOLOGY CLINIC | Age: 75
End: 2025-02-10
Payer: MEDICARE

## 2025-02-10 VITALS
BODY MASS INDEX: 28.58 KG/M2 | HEART RATE: 62 BPM | HEIGHT: 61 IN | DIASTOLIC BLOOD PRESSURE: 64 MMHG | SYSTOLIC BLOOD PRESSURE: 132 MMHG | WEIGHT: 151.4 LBS

## 2025-02-10 DIAGNOSIS — E78.00 PURE HYPERCHOLESTEROLEMIA: Primary | ICD-10-CM

## 2025-02-10 DIAGNOSIS — I10 BENIGN ESSENTIAL HTN: ICD-10-CM

## 2025-02-10 DIAGNOSIS — I10 BENIGN ESSENTIAL HTN: Primary | ICD-10-CM

## 2025-02-10 DIAGNOSIS — Z82.49 FAMILY HISTORY OF EARLY CAD: ICD-10-CM

## 2025-02-10 DIAGNOSIS — N18.30 STAGE 3 CHRONIC KIDNEY DISEASE, UNSPECIFIED WHETHER STAGE 3A OR 3B CKD (HCC): ICD-10-CM

## 2025-02-10 DIAGNOSIS — E78.00 PURE HYPERCHOLESTEROLEMIA: ICD-10-CM

## 2025-02-10 PROCEDURE — G8427 DOCREV CUR MEDS BY ELIG CLIN: HCPCS | Performed by: INTERNAL MEDICINE

## 2025-02-10 PROCEDURE — 93000 ELECTROCARDIOGRAM COMPLETE: CPT | Performed by: INTERNAL MEDICINE

## 2025-02-10 PROCEDURE — 1123F ACP DISCUSS/DSCN MKR DOCD: CPT | Performed by: INTERNAL MEDICINE

## 2025-02-10 PROCEDURE — 1036F TOBACCO NON-USER: CPT | Performed by: INTERNAL MEDICINE

## 2025-02-10 PROCEDURE — 1090F PRES/ABSN URINE INCON ASSESS: CPT | Performed by: INTERNAL MEDICINE

## 2025-02-10 PROCEDURE — 3017F COLORECTAL CA SCREEN DOC REV: CPT | Performed by: INTERNAL MEDICINE

## 2025-02-10 PROCEDURE — 99214 OFFICE O/P EST MOD 30 MIN: CPT | Performed by: INTERNAL MEDICINE

## 2025-02-10 PROCEDURE — 3075F SYST BP GE 130 - 139MM HG: CPT | Performed by: INTERNAL MEDICINE

## 2025-02-10 PROCEDURE — G8400 PT W/DXA NO RESULTS DOC: HCPCS | Performed by: INTERNAL MEDICINE

## 2025-02-10 PROCEDURE — 1159F MED LIST DOCD IN RCRD: CPT | Performed by: INTERNAL MEDICINE

## 2025-02-10 PROCEDURE — 3078F DIAST BP <80 MM HG: CPT | Performed by: INTERNAL MEDICINE

## 2025-02-10 PROCEDURE — G8417 CALC BMI ABV UP PARAM F/U: HCPCS | Performed by: INTERNAL MEDICINE

## 2025-02-10 RX ORDER — ISOSORBIDE MONONITRATE 30 MG/1
30 TABLET, EXTENDED RELEASE ORAL DAILY
Qty: 90 TABLET | Refills: 3 | Status: SHIPPED | OUTPATIENT
Start: 2025-02-10

## 2025-02-10 RX ORDER — NITROGLYCERIN 0.4 MG/1
0.4 TABLET SUBLINGUAL EVERY 5 MIN PRN
Qty: 25 TABLET | Refills: 3 | Status: SHIPPED | OUTPATIENT
Start: 2025-02-10

## 2025-02-10 NOTE — PATIENT INSTRUCTIONS
Continue current cardiovascular medications which have been reviewed and discussed individually with you.  Primary prevention is the goal by aggressive risk modification, healthy and therapeutic life style changes for cardiovascular risk reduction. Various goals are discussed and questions answered.  ppropriate prescriptions if needed on this visit are addressed. After visit summery is provided.   Questions answered and patient verbalizes understanding. Follow up in 12 months with ECG ,  sooner if needed.

## 2025-02-10 NOTE — ASSESSMENT & PLAN NOTE
Recent creatinine was 1.2.  She is encouraged to drink increased amount of fluids and avoid NSAIDs and she follows up with PCP for labs to be repeated every 6 months.

## 2025-02-10 NOTE — PROGRESS NOTES
Madiha Martin  1950  Jake Dumont MD      Chief Complaint   Patient presents with    Hypertension    Hyperlipidemia    1 Year Follow Up     Denies any chest pain, SOB, palpitations     Chief complaint and HPI:  Madiha Martin  is a 74 y.o. female following up for hypertension and hyperlipidemia and has positive family history of premature coronary artery disease and renal sufficiency.  She denies any cardiovascular symptoms.  She is having significant problem with her left knee and left ankle has been following up with orthopedics and had injections done with some relief.  She tries to avoid NSAIDs.  She does not smoke has never been a smoker.  She is compliant to her medications and monitoring her blood pressure at home and I have reviewed home readings.  Most of the readings are within recommended range.  Occasionally they are high.    Rest of the Cardiovascular system review is otherwise unchanged from prior encounter.  Past medical history:  has a past medical history of Anxiety state, Arthritis, Benign essential HTN, Breast lump, Bunion of great toe of right foot, CKD (chronic kidney disease), stage II, Colon polyp, Family history of early CAD, GERD (gastroesophageal reflux disease), History of nuclear stress test, Hyperlipidemia, Internal hemorrhoids, Irritable bowel syndrome, Nausea & vomiting, Osteoarthritis, PONV (postoperative nausea and vomiting), and Rosacea.  Past surgical history:  has a past surgical history that includes  section (); Tubal ligation (); Appendectomy (); Breast surgery (); Cholecystectomy (); Cardiac catheterization (); tumor removal; Foot Tendon Surgery; Bunionectomy (Left); Foot surgery; Colonoscopy (2018); shoulder surgery (Right, 2024); Biceps tendon repair (Right, 2024); and joint replacement.  Social History:   Social History     Tobacco Use    Smoking status: Never    Smokeless tobacco: Never   Substance Use Topics

## 2025-02-17 ENCOUNTER — TELEPHONE (OUTPATIENT)
Dept: ORTHOPEDIC SURGERY | Age: 75
End: 2025-02-17

## 2025-02-17 DIAGNOSIS — M17.12 ARTHRITIS OF KNEE, LEFT: Primary | ICD-10-CM

## 2025-02-17 NOTE — TELEPHONE ENCOUNTER
Pt called stated 2 weeks ago she was here for left knee injection .. It was discussed that if she was still having pain then she would need an MRI . She is still having a lot of pain and would like to get the MRI done

## 2025-02-20 ENCOUNTER — HOSPITAL ENCOUNTER (OUTPATIENT)
Dept: MRI IMAGING | Age: 75
Discharge: HOME | End: 2025-02-20
Payer: MEDICARE

## 2025-02-20 DIAGNOSIS — M17.12 ARTHRITIS OF KNEE, LEFT: ICD-10-CM

## 2025-02-20 PROCEDURE — 73721 MRI JNT OF LWR EXTRE W/O DYE: CPT

## 2025-02-21 SDOH — ECONOMIC STABILITY: FOOD INSECURITY: WITHIN THE PAST 12 MONTHS, YOU WORRIED THAT YOUR FOOD WOULD RUN OUT BEFORE YOU GOT MONEY TO BUY MORE.: NEVER TRUE

## 2025-02-21 SDOH — ECONOMIC STABILITY: TRANSPORTATION INSECURITY
IN THE PAST 12 MONTHS, HAS THE LACK OF TRANSPORTATION KEPT YOU FROM MEDICAL APPOINTMENTS OR FROM GETTING MEDICATIONS?: NO

## 2025-02-21 SDOH — ECONOMIC STABILITY: FOOD INSECURITY: WITHIN THE PAST 12 MONTHS, THE FOOD YOU BOUGHT JUST DIDN'T LAST AND YOU DIDN'T HAVE MONEY TO GET MORE.: NEVER TRUE

## 2025-02-21 SDOH — ECONOMIC STABILITY: INCOME INSECURITY: IN THE LAST 12 MONTHS, WAS THERE A TIME WHEN YOU WERE NOT ABLE TO PAY THE MORTGAGE OR RENT ON TIME?: NO

## 2025-02-21 ASSESSMENT — PATIENT HEALTH QUESTIONNAIRE - PHQ9
SUM OF ALL RESPONSES TO PHQ QUESTIONS 1-9: 0
SUM OF ALL RESPONSES TO PHQ QUESTIONS 1-9: 0
1. LITTLE INTEREST OR PLEASURE IN DOING THINGS: NOT AT ALL
SUM OF ALL RESPONSES TO PHQ9 QUESTIONS 1 & 2: 0
1. LITTLE INTEREST OR PLEASURE IN DOING THINGS: NOT AT ALL
2. FEELING DOWN, DEPRESSED OR HOPELESS: NOT AT ALL
SUM OF ALL RESPONSES TO PHQ QUESTIONS 1-9: 0
2. FEELING DOWN, DEPRESSED OR HOPELESS: NOT AT ALL
SUM OF ALL RESPONSES TO PHQ QUESTIONS 1-9: 0
SUM OF ALL RESPONSES TO PHQ9 QUESTIONS 1 & 2: 0

## 2025-02-24 ENCOUNTER — OFFICE VISIT (OUTPATIENT)
Dept: FAMILY MEDICINE CLINIC | Age: 75
End: 2025-02-24
Payer: MEDICARE

## 2025-02-24 VITALS
RESPIRATION RATE: 20 BRPM | OXYGEN SATURATION: 100 % | HEIGHT: 61 IN | SYSTOLIC BLOOD PRESSURE: 126 MMHG | WEIGHT: 152.8 LBS | HEART RATE: 62 BPM | DIASTOLIC BLOOD PRESSURE: 76 MMHG | BODY MASS INDEX: 28.85 KG/M2

## 2025-02-24 DIAGNOSIS — F41.1 ANXIETY STATE: ICD-10-CM

## 2025-02-24 DIAGNOSIS — K21.9 GASTROESOPHAGEAL REFLUX DISEASE WITHOUT ESOPHAGITIS: ICD-10-CM

## 2025-02-24 DIAGNOSIS — I10 BENIGN ESSENTIAL HTN: Primary | ICD-10-CM

## 2025-02-24 DIAGNOSIS — E78.00 PURE HYPERCHOLESTEROLEMIA: ICD-10-CM

## 2025-02-24 DIAGNOSIS — I10 BENIGN ESSENTIAL HTN: ICD-10-CM

## 2025-02-24 DIAGNOSIS — N18.30 STAGE 3 CHRONIC KIDNEY DISEASE, UNSPECIFIED WHETHER STAGE 3A OR 3B CKD (HCC): ICD-10-CM

## 2025-02-24 LAB
ALBUMIN SERPL-MCNC: 4.1 G/DL (ref 3.4–5)
ALBUMIN/GLOB SERPL: 2.1 {RATIO} (ref 1.1–2.2)
ALP SERPL-CCNC: 104 U/L (ref 40–129)
ALT SERPL-CCNC: 17 U/L (ref 10–40)
ANION GAP SERPL CALCULATED.3IONS-SCNC: 12 MMOL/L (ref 3–16)
AST SERPL-CCNC: 20 U/L (ref 15–37)
BILIRUB SERPL-MCNC: 0.8 MG/DL (ref 0–1)
BUN SERPL-MCNC: 22 MG/DL (ref 7–20)
CALCIUM SERPL-MCNC: 9.8 MG/DL (ref 8.3–10.6)
CHLORIDE SERPL-SCNC: 103 MMOL/L (ref 99–110)
CHOLEST SERPL-MCNC: 171 MG/DL (ref 0–199)
CO2 SERPL-SCNC: 25 MMOL/L (ref 21–32)
CREAT SERPL-MCNC: 1.3 MG/DL (ref 0.6–1.2)
DEPRECATED RDW RBC AUTO: 13.7 % (ref 12.4–15.4)
GFR SERPLBLD CREATININE-BSD FMLA CKD-EPI: 43 ML/MIN/{1.73_M2}
GLUCOSE SERPL-MCNC: 79 MG/DL (ref 70–99)
HCT VFR BLD AUTO: 37 % (ref 36–48)
HDLC SERPL-MCNC: 87 MG/DL (ref 40–60)
HGB BLD-MCNC: 12.5 G/DL (ref 12–16)
LDLC SERPL CALC-MCNC: 69 MG/DL
MCH RBC QN AUTO: 31.9 PG (ref 26–34)
MCHC RBC AUTO-ENTMCNC: 33.9 G/DL (ref 31–36)
MCV RBC AUTO: 94.2 FL (ref 80–100)
PLATELET # BLD AUTO: 182 K/UL (ref 135–450)
PMV BLD AUTO: 9 FL (ref 5–10.5)
POTASSIUM SERPL-SCNC: 4.1 MMOL/L (ref 3.5–5.1)
PROT SERPL-MCNC: 6.1 G/DL (ref 6.4–8.2)
RBC # BLD AUTO: 3.93 M/UL (ref 4–5.2)
SODIUM SERPL-SCNC: 140 MMOL/L (ref 136–145)
TRIGL SERPL-MCNC: 77 MG/DL (ref 0–150)
VLDLC SERPL CALC-MCNC: 15 MG/DL
WBC # BLD AUTO: 6.9 K/UL (ref 4–11)

## 2025-02-24 PROCEDURE — 1036F TOBACCO NON-USER: CPT | Performed by: FAMILY MEDICINE

## 2025-02-24 PROCEDURE — 3017F COLORECTAL CA SCREEN DOC REV: CPT | Performed by: FAMILY MEDICINE

## 2025-02-24 PROCEDURE — G8400 PT W/DXA NO RESULTS DOC: HCPCS | Performed by: FAMILY MEDICINE

## 2025-02-24 PROCEDURE — 3074F SYST BP LT 130 MM HG: CPT | Performed by: FAMILY MEDICINE

## 2025-02-24 PROCEDURE — G8417 CALC BMI ABV UP PARAM F/U: HCPCS | Performed by: FAMILY MEDICINE

## 2025-02-24 PROCEDURE — 1123F ACP DISCUSS/DSCN MKR DOCD: CPT | Performed by: FAMILY MEDICINE

## 2025-02-24 PROCEDURE — 1159F MED LIST DOCD IN RCRD: CPT | Performed by: FAMILY MEDICINE

## 2025-02-24 PROCEDURE — 1090F PRES/ABSN URINE INCON ASSESS: CPT | Performed by: FAMILY MEDICINE

## 2025-02-24 PROCEDURE — 3078F DIAST BP <80 MM HG: CPT | Performed by: FAMILY MEDICINE

## 2025-02-24 PROCEDURE — G8427 DOCREV CUR MEDS BY ELIG CLIN: HCPCS | Performed by: FAMILY MEDICINE

## 2025-02-24 PROCEDURE — 99214 OFFICE O/P EST MOD 30 MIN: CPT | Performed by: FAMILY MEDICINE

## 2025-02-24 RX ORDER — TRIAMTERENE AND HYDROCHLOROTHIAZIDE 37.5; 25 MG/1; MG/1
TABLET ORAL
Qty: 45 TABLET | Refills: 1 | Status: SHIPPED | OUTPATIENT
Start: 2025-02-24

## 2025-02-24 RX ORDER — ATORVASTATIN CALCIUM 20 MG/1
20 TABLET, FILM COATED ORAL DAILY
Qty: 90 TABLET | Refills: 1 | Status: SHIPPED | OUTPATIENT
Start: 2025-02-24

## 2025-02-24 ASSESSMENT — ENCOUNTER SYMPTOMS
TROUBLE SWALLOWING: 0
ABDOMINAL PAIN: 0
BLOOD IN STOOL: 0
VOMITING: 0
WHEEZING: 0
NAUSEA: 0
DIARRHEA: 0
BACK PAIN: 1
CHEST TIGHTNESS: 0
SHORTNESS OF BREATH: 0
EYE PAIN: 0

## 2025-02-24 NOTE — PROGRESS NOTES
2/24/2025    Madiha Martin    Chief Complaint   Patient presents with    6 Month Follow-Up     6 month f/u     Health Maintenance     Awv scheduled     Knee Pain     Had mri on Left knee on 2/20/25  and patient states has not got results of these from her DR but sees results on mychart of a torn meniscus. Follows Dr Eagle        HPI  History was obtained from the patient.  Madiha is a 74 y.o. female who presents today with routine recheck.  Madiha overall is doing well except she is having a lot of musculoskeletal issues patient with knee injury had previous shoulder surgery had multiple foot surgeries.  She has a past history of hypertension, hyperlipidemia, anxiety, GERD, osteoarthritis, varicose veins, and CKD 3.  Labs in August 2024 were stable.  Mood remains positive meds are tolerated.  Med list was reviewed and refills to be provided.  Also needs screening lab work.    REVIEW OF SYMPTOMS    Review of Systems   Constitutional:  Negative for activity change and fatigue.   HENT:  Negative for congestion, hearing loss, mouth sores and trouble swallowing.    Eyes:  Negative for pain and visual disturbance.   Respiratory:  Negative for chest tightness, shortness of breath and wheezing.    Cardiovascular:  Negative for chest pain and palpitations.        Known history of hypertension hyperlipidemia.   Gastrointestinal:  Negative for abdominal pain, blood in stool, diarrhea, nausea and vomiting.   Endocrine: Negative for polydipsia and polyuria.   Genitourinary:  Negative for dysuria, frequency and urgency.        Patient with history of CKD 3   Musculoskeletal:  Positive for arthralgias and back pain. Negative for gait problem and neck stiffness.   Skin:  Negative for rash.   Allergic/Immunologic: Negative for environmental allergies.   Neurological:  Negative for dizziness, seizures, speech difficulty and weakness.   Hematological:  Does not bruise/bleed easily.   Psychiatric/Behavioral:  Negative for agitation,

## 2025-02-25 ENCOUNTER — TELEPHONE (OUTPATIENT)
Dept: FAMILY MEDICINE CLINIC | Age: 75
End: 2025-02-25

## 2025-02-25 NOTE — TELEPHONE ENCOUNTER
Per Dr Leary note patient informed and voiced understanding.     Incidentally his wife Madiha's labs also were stable.  I believe she is seeing those online.

## 2025-02-28 ENCOUNTER — OFFICE VISIT (OUTPATIENT)
Dept: ORTHOPEDIC SURGERY | Age: 75
End: 2025-02-28
Payer: MEDICARE

## 2025-02-28 VITALS
HEART RATE: 64 BPM | HEIGHT: 61 IN | WEIGHT: 153 LBS | BODY MASS INDEX: 28.89 KG/M2 | OXYGEN SATURATION: 98 % | RESPIRATION RATE: 14 BRPM

## 2025-02-28 DIAGNOSIS — M17.12 ARTHRITIS OF KNEE, LEFT: ICD-10-CM

## 2025-02-28 DIAGNOSIS — S83.232A COMPLEX TEAR OF MEDIAL MENISCUS OF LEFT KNEE, UNSPECIFIED WHETHER OLD OR CURRENT TEAR, INITIAL ENCOUNTER: Primary | ICD-10-CM

## 2025-02-28 PROCEDURE — G8427 DOCREV CUR MEDS BY ELIG CLIN: HCPCS | Performed by: PHYSICIAN ASSISTANT

## 2025-02-28 PROCEDURE — 99211 OFF/OP EST MAY X REQ PHY/QHP: CPT | Performed by: PHYSICIAN ASSISTANT

## 2025-02-28 PROCEDURE — 1125F AMNT PAIN NOTED PAIN PRSNT: CPT | Performed by: PHYSICIAN ASSISTANT

## 2025-02-28 PROCEDURE — G8417 CALC BMI ABV UP PARAM F/U: HCPCS | Performed by: PHYSICIAN ASSISTANT

## 2025-02-28 ASSESSMENT — ENCOUNTER SYMPTOMS
EYES NEGATIVE: 1
RESPIRATORY NEGATIVE: 1
GASTROINTESTINAL NEGATIVE: 1

## 2025-02-28 NOTE — PATIENT INSTRUCTIONS
Continue weight-bearing as tolerated.  Continue range of motion exercises as instructed.  Ice and elevate as needed.  Tylenol or Motrin for pain.  Follow up with Dr. Mary    We are committed to providing you the best care possible.  If you receive a survey after visiting one of our offices, please take time to share your experience concerning your physician office visit.  These surveys are confidential and no health information about you is shared.  We are eager to improve for you and we are counting on your feedback to help make that happen.

## 2025-02-28 NOTE — PROGRESS NOTES
Review of Systems   Constitutional: Negative.    HENT: Negative.     Eyes: Negative.    Respiratory: Negative.     Cardiovascular: Negative.    Gastrointestinal: Negative.    Genitourinary: Negative.    Musculoskeletal:  Positive for arthralgias.   Skin: Negative.    Neurological: Negative.    Psychiatric/Behavioral: Negative.           HPI:  Madiha Martin is a 74 y.o. female that presents to the office today to go over MRI results of the left knee.  She states she continues to have pain in the medial aspect of the knee that is sharp and worse if she goes to twist.  She feels like something is catching in the knee.  She states that the injection she had at the beginning of February helped some but the pain is still fairly intolerable and keeping her from doing the things that she wants to do.    Past Medical History:   Diagnosis Date    Anxiety state     Arthritis     Benign essential HTN     Breast lump     Bunion of great toe of right foot     CKD (chronic kidney disease), stage II     Colon polyp 10/31/2014    Family history of early CAD     father had CABG in his 50s and 60s, brother had stroke and MI in his 70s. son had MI in 40s other son stroke at young age    GERD (gastroesophageal reflux disease)     History of nuclear stress test 2020    EF 71%. Normal study.    Hyperlipidemia     Internal hemorrhoids 10/31/2014    Irritable bowel syndrome     Nausea & vomiting     Osteoarthritis     PONV (postoperative nausea and vomiting)     Rosacea        Past Surgical History:   Procedure Laterality Date    APPENDECTOMY  1963    BICEPS TENDON REPAIR Right 2024    RIGHT BICEPS TENODESIS performed by Maximo [facility] DO at [facility] OR    BREAST SURGERY      BIOPSY    BUNIONECTOMY Left     CARDIAC CATHETERIZATION       SECTION      CHOLECYSTECTOMY      COLONOSCOPY  2018    FOOT SURGERY      Multiple Foot surgeries including pin in ankle and Heel Spurs    FOOT TENDON SURGERY

## 2025-03-14 ENCOUNTER — OFFICE VISIT (OUTPATIENT)
Dept: ORTHOPEDIC SURGERY | Age: 75
End: 2025-03-14
Payer: MEDICARE

## 2025-03-14 VITALS — OXYGEN SATURATION: 98 % | BODY MASS INDEX: 29.1 KG/M2 | HEART RATE: 64 BPM | WEIGHT: 154 LBS

## 2025-03-14 DIAGNOSIS — S83.232A COMPLEX TEAR OF MEDIAL MENISCUS OF LEFT KNEE, UNSPECIFIED WHETHER OLD OR CURRENT TEAR, INITIAL ENCOUNTER: Primary | ICD-10-CM

## 2025-03-14 PROCEDURE — 1036F TOBACCO NON-USER: CPT | Performed by: STUDENT IN AN ORGANIZED HEALTH CARE EDUCATION/TRAINING PROGRAM

## 2025-03-14 PROCEDURE — G8400 PT W/DXA NO RESULTS DOC: HCPCS | Performed by: STUDENT IN AN ORGANIZED HEALTH CARE EDUCATION/TRAINING PROGRAM

## 2025-03-14 PROCEDURE — 99214 OFFICE O/P EST MOD 30 MIN: CPT | Performed by: STUDENT IN AN ORGANIZED HEALTH CARE EDUCATION/TRAINING PROGRAM

## 2025-03-14 PROCEDURE — 3017F COLORECTAL CA SCREEN DOC REV: CPT | Performed by: STUDENT IN AN ORGANIZED HEALTH CARE EDUCATION/TRAINING PROGRAM

## 2025-03-14 PROCEDURE — G8417 CALC BMI ABV UP PARAM F/U: HCPCS | Performed by: STUDENT IN AN ORGANIZED HEALTH CARE EDUCATION/TRAINING PROGRAM

## 2025-03-14 PROCEDURE — 1090F PRES/ABSN URINE INCON ASSESS: CPT | Performed by: STUDENT IN AN ORGANIZED HEALTH CARE EDUCATION/TRAINING PROGRAM

## 2025-03-14 PROCEDURE — 1123F ACP DISCUSS/DSCN MKR DOCD: CPT | Performed by: STUDENT IN AN ORGANIZED HEALTH CARE EDUCATION/TRAINING PROGRAM

## 2025-03-14 PROCEDURE — G8427 DOCREV CUR MEDS BY ELIG CLIN: HCPCS | Performed by: STUDENT IN AN ORGANIZED HEALTH CARE EDUCATION/TRAINING PROGRAM

## 2025-03-14 PROCEDURE — 1159F MED LIST DOCD IN RCRD: CPT | Performed by: STUDENT IN AN ORGANIZED HEALTH CARE EDUCATION/TRAINING PROGRAM

## 2025-03-14 ASSESSMENT — ENCOUNTER SYMPTOMS
NAUSEA: 0
WHEEZING: 0
COUGH: 0
BACK PAIN: 0
SORE THROAT: 0
VOICE CHANGE: 0
COLOR CHANGE: 0
VOMITING: 0
SHORTNESS OF BREATH: 0

## 2025-03-14 NOTE — PROGRESS NOTES
Consult arthroscopy left knee        IMPRESSION:   1.  Torn medial meniscus posterior horn and posterior root.  2.  Mild medial meniscus extrusion  3.  Inner free edge fraying body of medial meniscus  4.  Myxoid features lateral meniscus  5.  Tricompartmental osteoarthritis. High-grade chondromalacia patellofemoral   and medial compartments.  6.  Joint effusion with synovitis  7.  Small Baker's cyst  8.  Superficial varicosities  9.  No acute ligament rupture

## 2025-03-14 NOTE — PATIENT INSTRUCTIONS
We will schedule surgery at soonest convenience. If you have any questions regarding your surgery please call our office and ask to speak with Sherry [phone].     If you already spoke with Sherry, please carefully review any instructions she has provided you for your upcoming surgery.

## 2025-03-14 NOTE — PROGRESS NOTES
3/14/2025   Chief Complaint   Patient presents with    Surgical Consult     Left knee arthroscopy consult        History of Present Illness:                             Madiha Martin is a 74 y.o. female      Consult arthroscopy left knee         IMPRESSION:   1.  Torn medial meniscus posterior horn and posterior root.  2.  Mild medial meniscus extrusion  3.  Inner free edge fraying body of medial meniscus  4.  Myxoid features lateral meniscus  5.  Tricompartmental osteoarthritis. High-grade chondromalacia patellofemoral   and medial compartments.  6.  Joint effusion with synovitis  7.  Small Baker's cyst  8.  Superficial varicosities  9.  No acute ligament rupture        Patient is here for left knee.  She has been previously seen by myself and undergone a right shoulder replacement.  She has been having significant worsening issues of her left knee with mechanical symptoms and catching.  She has not attempted any type of injection thus far but is been doing therapy exercises on her own as well as anti-inflammatories with no relief.  No prior left knee pain or surgery.  No known injury.      Medical History  Patient's medications, allergies, past medical, surgical, social and family histories were reviewed and updated as appropriate.    Past Medical History:   Diagnosis Date    Anxiety state     Arthritis     Benign essential HTN     Breast lump     Bunion of great toe of right foot     CKD (chronic kidney disease), stage II     Colon polyp 10/31/2014    Family history of early CAD     father had CABG in his 50s and 60s, brother had stroke and MI in his 70s. son had MI in 40s other son stroke at young age    GERD (gastroesophageal reflux disease)     History of nuclear stress test 07/07/2020    EF 71%. Normal study.    Hyperlipidemia     Internal hemorrhoids 10/31/2014    Irritable bowel syndrome     Nausea & vomiting     Osteoarthritis     PONV (postoperative nausea and vomiting)     Rosacea      Past

## 2025-03-17 ENCOUNTER — TELEPHONE (OUTPATIENT)
Dept: ORTHOPEDIC SURGERY | Age: 75
End: 2025-03-17

## 2025-03-17 NOTE — TELEPHONE ENCOUNTER
Patient scheduled for    Left Knee Arthroscopic Partial Medial Meniscectomy  Date: 4/3/25  Facility: [facility]  Surgeon: Maximo Mary, DO    [facility] Surgery Request 3/17/25  PCP clearance routed via Epic 3/17/25  Pre op appt 3/14/25    [employer]  Contacted 3/17/25 via [employer] with clinicals uploaded  Status: Approved CPT 62002 ICD S83.242A for outpatient  Auth# [id number]  Date Span: 4/3/25-7/3/25    Phone PAT

## 2025-03-25 ENCOUNTER — TELEPHONE (OUTPATIENT)
Dept: ORTHOPEDIC SURGERY | Age: 75
End: 2025-03-25

## 2025-03-25 NOTE — TELEPHONE ENCOUNTER
Please correct your office note. Patient is concerned that your office note is not correct. Patient had a steroid injection with Pranav Eagle on 02/03/2025.    Patient is here for left knee.  She has been previously seen by myself and undergone a right shoulder replacement.  She has been having significant worsening issues of her left knee with mechanical symptoms and catching.  She has not attempted any type of injection thus far but is been doing therapy exercises on her own as well as anti-inflammatories with no relief.  No prior left knee pain or surgery.  No known injury.

## 2025-03-31 ENCOUNTER — TELEPHONE (OUTPATIENT)
Dept: ORTHOPEDIC SURGERY | Age: 75
End: 2025-03-31

## 2025-03-31 DIAGNOSIS — S83.232A COMPLEX TEAR OF MEDIAL MENISCUS OF LEFT KNEE, UNSPECIFIED WHETHER OLD OR CURRENT TEAR, INITIAL ENCOUNTER: Primary | ICD-10-CM

## 2025-04-03 ENCOUNTER — POST-OP TELEPHONE (OUTPATIENT)
Dept: ORTHOPEDIC SURGERY | Age: 75
End: 2025-04-03

## 2025-04-03 DIAGNOSIS — Z09 POSTOP CHECK: Primary | ICD-10-CM

## 2025-04-03 RX ORDER — ASPIRIN 325 MG
325 TABLET ORAL DAILY
Qty: 30 TABLET | Refills: 2 | Status: SHIPPED | OUTPATIENT
Start: 2025-04-03

## 2025-04-03 RX ORDER — ONDANSETRON 4 MG/1
4 TABLET, ORALLY DISINTEGRATING ORAL 3 TIMES DAILY PRN
Qty: 21 TABLET | Refills: 1 | Status: SHIPPED | OUTPATIENT
Start: 2025-04-03

## 2025-04-03 RX ORDER — HYDROCODONE BITARTRATE AND ACETAMINOPHEN 5; 325 MG/1; MG/1
1 TABLET ORAL EVERY 4 HOURS PRN
Qty: 30 TABLET | Refills: 0 | Status: SHIPPED | OUTPATIENT
Start: 2025-04-03 | End: 2025-04-08

## 2025-04-03 RX ORDER — CYCLOBENZAPRINE HCL 5 MG
5 TABLET ORAL 2 TIMES DAILY PRN
Qty: 30 TABLET | Refills: 2 | Status: SHIPPED | OUTPATIENT
Start: 2025-04-03 | End: 2025-04-13

## 2025-04-03 NOTE — PROGRESS NOTES
Postop medications sent to pharmacy.    Procedure: Left knee arthroscopy, partial medial and lateral meniscectomies, chondroplasty    Medications:   daily  Flexeril  Zofran  Norco

## 2025-04-14 ENCOUNTER — OFFICE VISIT (OUTPATIENT)
Dept: ORTHOPEDIC SURGERY | Age: 75
End: 2025-04-14

## 2025-04-14 VITALS — HEART RATE: 82 BPM | HEIGHT: 64 IN | OXYGEN SATURATION: 98 % | RESPIRATION RATE: 15 BRPM | BODY MASS INDEX: 26.43 KG/M2

## 2025-04-14 DIAGNOSIS — Z09 POSTOP CHECK: Primary | ICD-10-CM

## 2025-04-14 RX ORDER — CYCLOBENZAPRINE HCL 5 MG
5 TABLET ORAL 2 TIMES DAILY PRN
COMMUNITY
Start: 2025-04-03

## 2025-04-14 ASSESSMENT — ENCOUNTER SYMPTOMS
EYE REDNESS: 0
PHOTOPHOBIA: 0
NAUSEA: 0
COLOR CHANGE: 0
COUGH: 0
STRIDOR: 0
FACIAL SWELLING: 0
ABDOMINAL PAIN: 0
EYE PAIN: 0
WHEEZING: 0
VOMITING: 0
BACK PAIN: 0
SHORTNESS OF BREATH: 0

## 2025-04-14 NOTE — PROGRESS NOTES
4/14/2025   Chief Complaint   Patient presents with    1 Year Follow Up     Right reverse total shoulder        History of Present Illness:                             Madiha Martin is a 74 y.o. female   Date of surgery: 3-     Procedure:   Right reverse total shoulder arthroplasty (82699) without a subscapularis repair   Right shoulder open biceps tenodesis (09436)      History:  Madiha is here in follow up regarding their 1 year postop appointment for above procedure    Patient is doing well. They have resolved 0/10 pain.  No new falls or injuries.  They deny chest pain, SOB, calf pain,fever,wound drainage.  Patient's only complaint is continued limited internal range of motion which she knew would likely be the case after the surgery.  No other issues.  Patient denies any constitutional symptoms.    Patient states they have been compliant with restrictions.    Patient continues the physical therapy exercises on her      Medical History  Patient's medications, allergies, past medical, surgical, social and family histories were reviewed and updated as appropriate.    Past Medical History:   Diagnosis Date    Anxiety state     Arthritis     Benign essential HTN     Breast lump     Bunion of great toe of right foot     CKD (chronic kidney disease), stage II     Colon polyp 10/31/2014    Family history of early CAD     father had CABG in his 50s and 60s, brother had stroke and MI in his 70s. son had MI in 40s other son stroke at young age    GERD (gastroesophageal reflux disease)     History of nuclear stress test 07/07/2020    EF 71%. Normal study.    Hyperlipidemia     Internal hemorrhoids 10/31/2014    Irritable bowel syndrome     Nausea & vomiting     Osteoarthritis     PONV (postoperative nausea and vomiting)     Rosacea      Past Surgical History:   Procedure Laterality Date    APPENDECTOMY  1963    BICEPS TENDON REPAIR Right 03/26/2024    RIGHT BICEPS TENODESIS performed by Maximo Mary DO

## 2025-04-14 NOTE — PROGRESS NOTES
Patient returns to the office with for a 1 year checkup on her right total reverse shoulder. Pt stated that she has seen progress in the last year and complains of little pain. Pt has continued doing her exercises and feels that's when her pain will increase. Pt stated that she feels stiff at times and has difficulty with activities behind her back.

## 2025-04-21 ENCOUNTER — OFFICE VISIT (OUTPATIENT)
Dept: ORTHOPEDIC SURGERY | Age: 75
End: 2025-04-21

## 2025-04-21 VITALS
HEIGHT: 64 IN | BODY MASS INDEX: 26.29 KG/M2 | WEIGHT: 154 LBS | OXYGEN SATURATION: 100 % | TEMPERATURE: 97.6 F | HEART RATE: 58 BPM | RESPIRATION RATE: 14 BRPM

## 2025-04-21 DIAGNOSIS — Z98.890 STATUS POST ARTHROSCOPY OF LEFT KNEE: Primary | ICD-10-CM

## 2025-04-21 PROCEDURE — 99024 POSTOP FOLLOW-UP VISIT: CPT | Performed by: PHYSICIAN ASSISTANT

## 2025-04-21 SDOH — HEALTH STABILITY: PHYSICAL HEALTH: ON AVERAGE, HOW MANY MINUTES DO YOU ENGAGE IN EXERCISE AT THIS LEVEL?: 10 MIN

## 2025-04-21 SDOH — HEALTH STABILITY: PHYSICAL HEALTH: ON AVERAGE, HOW MANY DAYS PER WEEK DO YOU ENGAGE IN MODERATE TO STRENUOUS EXERCISE (LIKE A BRISK WALK)?: 2 DAYS

## 2025-04-21 NOTE — PROGRESS NOTES
Date of surgery:   4/3/2025  Surgeon Dr. Maximo Mary    History:  Ms. Madiha Martin is here in follow up regarding her left knee arthroscopic medial meniscectomy and chondroplasty.  She states that she is doing pretty well and not taking any narcotics and only taking Tylenol for pain.  She does have some tightness in her hamstring and behind her knee.  She denies any shortness of breath or chest pain.  She is taking the aspirin as prescribed.    Physical:  Vitals:    04/21/25 1456   Pulse: 58   Resp: 14   Temp: 97.6 °F (36.4 °C)   SpO2: 100%      She demonstrates appropriate mood and affect.   Left knee:  Arthroscopic portal sites are healed, mild ecchymosis present.  No significant knee joint effusion  Range of motion 0-115 degrees.  There is tenderness to palpation over the medial joint line.    Impression: Status post left knee arthroscopy with medial meniscectomy    Plan:   May slowly begin activities as tolerated  Take the aspirin for another 1-2 weeks.  Follow-up in 4 weeks with Dr. Mary

## 2025-04-23 LAB — MAMMOGRAPHY, EXTERNAL: NORMAL

## 2025-04-24 ENCOUNTER — OFFICE VISIT (OUTPATIENT)
Dept: FAMILY MEDICINE CLINIC | Age: 75
End: 2025-04-24
Payer: MEDICARE

## 2025-04-24 VITALS
HEIGHT: 64 IN | OXYGEN SATURATION: 99 % | WEIGHT: 150 LBS | SYSTOLIC BLOOD PRESSURE: 134 MMHG | DIASTOLIC BLOOD PRESSURE: 72 MMHG | HEART RATE: 59 BPM | BODY MASS INDEX: 25.61 KG/M2

## 2025-04-24 DIAGNOSIS — M79.672 PAIN IN BOTH FEET: ICD-10-CM

## 2025-04-24 DIAGNOSIS — Z76.89 ENCOUNTER TO ESTABLISH CARE: ICD-10-CM

## 2025-04-24 DIAGNOSIS — N18.30 STAGE 3 CHRONIC KIDNEY DISEASE, UNSPECIFIED WHETHER STAGE 3A OR 3B CKD (HCC): Primary | ICD-10-CM

## 2025-04-24 DIAGNOSIS — E78.00 PURE HYPERCHOLESTEROLEMIA: ICD-10-CM

## 2025-04-24 DIAGNOSIS — Z98.890 S/P MEDIAL MENISCUS REPAIR OF LEFT KNEE: ICD-10-CM

## 2025-04-24 DIAGNOSIS — I10 BENIGN ESSENTIAL HTN: ICD-10-CM

## 2025-04-24 DIAGNOSIS — M79.671 PAIN IN BOTH FEET: ICD-10-CM

## 2025-04-24 PROBLEM — Z09 POSTOP CHECK: Status: RESOLVED | Noted: 2024-04-17 | Resolved: 2025-04-24

## 2025-04-24 PROCEDURE — 3078F DIAST BP <80 MM HG: CPT

## 2025-04-24 PROCEDURE — 99214 OFFICE O/P EST MOD 30 MIN: CPT

## 2025-04-24 PROCEDURE — 1123F ACP DISCUSS/DSCN MKR DOCD: CPT

## 2025-04-24 PROCEDURE — 1036F TOBACCO NON-USER: CPT

## 2025-04-24 PROCEDURE — G8417 CALC BMI ABV UP PARAM F/U: HCPCS

## 2025-04-24 PROCEDURE — 3017F COLORECTAL CA SCREEN DOC REV: CPT

## 2025-04-24 PROCEDURE — G8427 DOCREV CUR MEDS BY ELIG CLIN: HCPCS

## 2025-04-24 PROCEDURE — 1160F RVW MEDS BY RX/DR IN RCRD: CPT

## 2025-04-24 PROCEDURE — 1159F MED LIST DOCD IN RCRD: CPT

## 2025-04-24 PROCEDURE — 1090F PRES/ABSN URINE INCON ASSESS: CPT

## 2025-04-24 PROCEDURE — G8400 PT W/DXA NO RESULTS DOC: HCPCS

## 2025-04-24 PROCEDURE — 3075F SYST BP GE 130 - 139MM HG: CPT

## 2025-04-24 ASSESSMENT — ENCOUNTER SYMPTOMS
SHORTNESS OF BREATH: 0
ABDOMINAL PAIN: 0

## 2025-04-24 NOTE — PROGRESS NOTES
Cardiovascular:  Negative for chest pain.   Gastrointestinal:  Negative for abdominal pain.   Musculoskeletal:  Positive for arthralgias (Left foot/knee). Negative for gait problem and joint swelling.   Skin:  Negative for rash.   Psychiatric/Behavioral:  Negative for dysphoric mood. The patient is not nervous/anxious.        Physical Exam:  Vitals:    04/24/25 1033   BP: 134/72   Pulse: 59   SpO2: 99%     BP Readings from Last 3 Encounters:   04/24/25 134/72   02/24/25 126/76   02/10/25 132/64      Wt Readings from Last 3 Encounters:   04/24/25 68 kg (150 lb)   04/21/25 69.9 kg (154 lb)   03/14/25 69.9 kg (154 lb)       Physical Exam  Vitals and nursing note reviewed.   Constitutional:       General: She is not in acute distress.     Appearance: Normal appearance. She is not ill-appearing.   HENT:      Head: Normocephalic and atraumatic.   Cardiovascular:      Rate and Rhythm: Normal rate and regular rhythm.      Pulses: Normal pulses.      Heart sounds: Normal heart sounds. No murmur heard.  Pulmonary:      Effort: Pulmonary effort is normal.      Breath sounds: Normal breath sounds.   Abdominal:      General: Abdomen is flat. Bowel sounds are normal.      Palpations: Abdomen is soft.      Tenderness: There is no abdominal tenderness.   Musculoskeletal:         General: Tenderness (Mild left knee tenderness SP procedure) present. No swelling. Normal range of motion.      Cervical back: Normal range of motion.   Skin:     General: Skin is warm and dry.      Findings: No erythema or rash.   Neurological:      General: No focal deficit present.      Mental Status: She is alert and oriented to person, place, and time. Mental status is at baseline.   Psychiatric:         Mood and Affect: Mood normal.         Thought Content: Thought content normal.         The patient indicates understanding of of issues discussed today and agrees with the plan  I reviewed the patient's past medical, family and and social

## 2025-04-24 NOTE — ASSESSMENT & PLAN NOTE
Healing well 3 weeks postop meniscus repair (4/20/2025 with Dr. Mary, Ortho).  States she does not need PT at this time.  Follow-up with Ortho postop as scheduled

## 2025-04-24 NOTE — ASSESSMENT & PLAN NOTE
Has not seen nephrology.  2/2025 labs showed stable CKD.  Continue NSAID avoidance and increasing fluid hydration.  Recheck in 6 months.  May consider switching her off of the diuretic in the future

## 2025-04-24 NOTE — ASSESSMENT & PLAN NOTE
SP multiple surgeries.  Improving with hemp cream, continue as needed. Follow-up with Dr. Peterson

## 2025-04-25 DIAGNOSIS — Z09 POSTOP CHECK: ICD-10-CM

## 2025-04-28 RX ORDER — ASPIRIN 81 MG
325 TABLET,CHEWABLE ORAL DAILY
Qty: 90 TABLET | Refills: 1 | Status: SHIPPED | OUTPATIENT
Start: 2025-04-28

## 2025-05-06 ENCOUNTER — RESULTS FOLLOW-UP (OUTPATIENT)
Dept: FAMILY MEDICINE CLINIC | Age: 75
End: 2025-05-06

## 2025-05-16 ASSESSMENT — PROMIS GLOBAL HEALTH SCALE
TO WHAT EXTENT ARE YOU ABLE TO CARRY OUT YOUR EVERYDAY PHYSICAL ACTIVITIES SUCH AS WALKING, CLIMBING STAIRS, CARRYING GROCERIES, OR MOVING A CHAIR [ON A SCALE OF 1 (NOT AT ALL) TO 5 (COMPLETELY)]?: COMPLETELY
IN GENERAL, WOULD YOU SAY YOUR QUALITY OF LIFE IS...[ON A SCALE OF 1 (POOR) TO 5 (EXCELLENT)]: GOOD
IN GENERAL, WOULD YOU SAY YOUR HEALTH IS...[ON A SCALE OF 1 (POOR) TO 5 (EXCELLENT)]: GOOD
IN GENERAL, PLEASE RATE HOW WELL YOU CARRY OUT YOUR USUAL SOCIAL ACTIVITIES (INCLUDES ACTIVITIES AT HOME, AT WORK, AND IN YOUR COMMUNITY, AND RESPONSIBILITIES AS A PARENT, CHILD, SPOUSE, EMPLOYEE, FRIEND, ETC) [ON A SCALE OF 1 (POOR) TO 5 (EXCELLENT)]?: VERY GOOD
IN GENERAL, HOW WOULD YOU RATE YOUR SATISFACTION WITH YOUR SOCIAL ACTIVITIES AND RELATIONSHIPS [ON A SCALE OF 1 (POOR) TO 5 (EXCELLENT)]?: VERY GOOD
IN THE PAST 7 DAYS, HOW WOULD YOU RATE YOUR PAIN ON AVERAGE [ON A SCALE FROM 0 (NO PAIN) TO 10 (WORST IMAGINABLE PAIN)]?: 3
IN GENERAL, HOW WOULD YOU RATE YOUR MENTAL HEALTH, INCLUDING YOUR MOOD AND YOUR ABILITY TO THINK [ON A SCALE OF 1 (POOR) TO 5 (EXCELLENT)]?: VERY GOOD
IN THE PAST 7 DAYS, HOW OFTEN HAVE YOU BEEN BOTHERED BY EMOTIONAL PROBLEMS, SUCH AS FEELING ANXIOUS, DEPRESSED, OR IRRITABLE [ON A SCALE FROM 1 (NEVER) TO 5 (ALWAYS)]?: RARELY
IN GENERAL, HOW WOULD YOU RATE YOUR SATISFACTION WITH YOUR SOCIAL ACTIVITIES AND RELATIONSHIPS [ON A SCALE OF 1 (POOR) TO 5 (EXCELLENT)]?: VERY GOOD
HOW IS THE PROMIS V1.1 BEING ADMINISTERED?: ELECTRONIC
IN GENERAL, HOW WOULD YOU RATE YOUR PHYSICAL HEALTH [ON A SCALE OF 1 (POOR) TO 5 (EXCELLENT)]?: GOOD
IN THE PAST 7 DAYS, HOW OFTEN HAVE YOU BEEN BOTHERED BY EMOTIONAL PROBLEMS, SUCH AS FEELING ANXIOUS, DEPRESSED, OR IRRITABLE [ON A SCALE FROM 1 (NEVER) TO 5 (ALWAYS)]?: RARELY
SUM OF RESPONSES TO QUESTIONS 3, 6, 7, & 8: 14
IN THE PAST 7 DAYS, HOW WOULD YOU RATE YOUR FATIGUE ON AVERAGE [ON A SCALE FROM 1 (NONE) TO 5 (VERY SEVERE)]?: MODERATE
IN GENERAL, PLEASE RATE HOW WELL YOU CARRY OUT YOUR USUAL SOCIAL ACTIVITIES (INCLUDES ACTIVITIES AT HOME, AT WORK, AND IN YOUR COMMUNITY, AND RESPONSIBILITIES AS A PARENT, CHILD, SPOUSE, EMPLOYEE, FRIEND, ETC) [ON A SCALE OF 1 (POOR) TO 5 (EXCELLENT)]?: VERY GOOD
SUM OF RESPONSES TO QUESTIONS 2, 4, 5, & 10: 15
IN THE PAST 7 DAYS, HOW WOULD YOU RATE YOUR PAIN ON AVERAGE [ON A SCALE FROM 0 (NO PAIN) TO 10 (WORST IMAGINABLE PAIN)]?: 3
WHO IS THE PERSON COMPLETING THE PROMIS V1.1 SURVEY?: SELF
HOW IS THE PROMIS V1.1 BEING ADMINISTERED?: ELECTRONIC
TO WHAT EXTENT ARE YOU ABLE TO CARRY OUT YOUR EVERYDAY PHYSICAL ACTIVITIES SUCH AS WALKING, CLIMBING STAIRS, CARRYING GROCERIES, OR MOVING A CHAIR [ON A SCALE OF 1 (NOT AT ALL) TO 5 (COMPLETELY)]?: COMPLETELY
IN THE PAST 7 DAYS, HOW WOULD YOU RATE YOUR FATIGUE ON AVERAGE [ON A SCALE FROM 1 (NONE) TO 5 (VERY SEVERE)]?: MODERATE
WHO IS THE PERSON COMPLETING THE PROMIS V1.1 SURVEY?: SELF

## 2025-05-19 ENCOUNTER — OFFICE VISIT (OUTPATIENT)
Dept: ORTHOPEDIC SURGERY | Age: 75
End: 2025-05-19

## 2025-05-19 VITALS — WEIGHT: 148 LBS | BODY MASS INDEX: 25.4 KG/M2 | OXYGEN SATURATION: 98 % | HEART RATE: 64 BPM

## 2025-05-19 DIAGNOSIS — Z09 POSTOP CHECK: Primary | ICD-10-CM

## 2025-05-19 PROCEDURE — 99024 POSTOP FOLLOW-UP VISIT: CPT | Performed by: STUDENT IN AN ORGANIZED HEALTH CARE EDUCATION/TRAINING PROGRAM

## 2025-05-19 RX ORDER — METHYLPREDNISOLONE 4 MG/1
TABLET ORAL
Qty: 1 KIT | Refills: 0 | Status: SHIPPED | OUTPATIENT
Start: 2025-05-19 | End: 2025-05-25

## 2025-05-19 NOTE — PROGRESS NOTES
Date of surgery: 4-3-2025     Procedure:   Left knee arthroscopy, partial medial and lateral meniscectomies, chondroplasty       History:  Patient is here in follow up regarding their 6-week postop appoint for above procedure    Patient is doing well. They have slowly improving 2/10 pain.  She does have some chronic sciatica that is been somewhat worse over the last several weeks.  They deny chest pain, SOB, calf pain,fever,wound drainage.  No other issues.  Patient denies any constitutional symptoms.    Patient states they have been compliant with restrictions.  Patient ambulates without any assistive device and without limp      Physical:   Patient demonstrates appropriate mood and affect.     Left lower extremity exam:   The incisions are well-healed and are clean, dry, intact, and nontender with no erythema. They have no edema, the Leg compartments are soft . There are No cords or calf tenderness.  No significant calf/ankle edema. They are neurovascularly intact distally.     Range of motion of the left knee demonstrates full active and passive range of motion without pain     No areas of tenderness to palpation    Negative Amara's    Imaging:   No new orthopedic imaging     Impression: Status post above, doing well        Plan:   - Intraoperative arthroscopic imaging reviewed with patient  - Patient will continue activities as tolerated.  No bracing needed  -Home exercise program provided  -continue the PT protocol   -Patient is weight-bear as tolerated, range of motion as tolerated  -rest/elevation as needed  - Order placed for Medrol Dosepak to help with some of her sciatica.  She will continue taking the muscle relaxer from surgery to see if this also helps with her symptoms and she can call for refill as needed  -f/u in 6 week(s) for possible cortisone injection but she can call and cancel if she is doing well  -f/u sooner prn any issues

## 2025-05-19 NOTE — PATIENT INSTRUCTIONS
Home exercises given for knee and sciatic pain.  Medication sent to your pharmacy.  Follow up in 6 weeks.

## 2025-07-05 ASSESSMENT — PROMIS GLOBAL HEALTH SCALE
IN GENERAL, HOW WOULD YOU RATE YOUR SATISFACTION WITH YOUR SOCIAL ACTIVITIES AND RELATIONSHIPS [ON A SCALE OF 1 (POOR) TO 5 (EXCELLENT)]?: VERY GOOD
IN GENERAL, HOW WOULD YOU RATE YOUR PHYSICAL HEALTH [ON A SCALE OF 1 (POOR) TO 5 (EXCELLENT)]?: GOOD
HOW IS THE PROMIS V1.1 BEING ADMINISTERED?: ELECTRONIC
IN THE PAST 7 DAYS, HOW WOULD YOU RATE YOUR PAIN ON AVERAGE [ON A SCALE FROM 0 (NO PAIN) TO 10 (WORST IMAGINABLE PAIN)]?: 2
IN GENERAL, WOULD YOU SAY YOUR HEALTH IS...[ON A SCALE OF 1 (POOR) TO 5 (EXCELLENT)]: GOOD
HOW IS THE PROMIS V1.1 BEING ADMINISTERED?: ELECTRONIC
IN GENERAL, HOW WOULD YOU RATE YOUR MENTAL HEALTH, INCLUDING YOUR MOOD AND YOUR ABILITY TO THINK [ON A SCALE OF 1 (POOR) TO 5 (EXCELLENT)]?: GOOD
IN THE PAST 7 DAYS, HOW OFTEN HAVE YOU BEEN BOTHERED BY EMOTIONAL PROBLEMS, SUCH AS FEELING ANXIOUS, DEPRESSED, OR IRRITABLE [ON A SCALE FROM 1 (NEVER) TO 5 (ALWAYS)]?: NEVER
IN THE PAST 7 DAYS, HOW WOULD YOU RATE YOUR FATIGUE ON AVERAGE [ON A SCALE FROM 1 (NONE) TO 5 (VERY SEVERE)]?: MILD
IN GENERAL, PLEASE RATE HOW WELL YOU CARRY OUT YOUR USUAL SOCIAL ACTIVITIES (INCLUDES ACTIVITIES AT HOME, AT WORK, AND IN YOUR COMMUNITY, AND RESPONSIBILITIES AS A PARENT, CHILD, SPOUSE, EMPLOYEE, FRIEND, ETC) [ON A SCALE OF 1 (POOR) TO 5 (EXCELLENT)]?: VERY GOOD
IN THE PAST 7 DAYS, HOW OFTEN HAVE YOU BEEN BOTHERED BY EMOTIONAL PROBLEMS, SUCH AS FEELING ANXIOUS, DEPRESSED, OR IRRITABLE [ON A SCALE FROM 1 (NEVER) TO 5 (ALWAYS)]?: NEVER
WHO IS THE PERSON COMPLETING THE PROMIS V1.1 SURVEY?: SELF
SUM OF RESPONSES TO QUESTIONS 2, 4, 5, & 10: 16
SUM OF RESPONSES TO QUESTIONS 3, 6, 7, & 8: 14
IN THE PAST 7 DAYS, HOW WOULD YOU RATE YOUR PAIN ON AVERAGE [ON A SCALE FROM 0 (NO PAIN) TO 10 (WORST IMAGINABLE PAIN)]?: 2
TO WHAT EXTENT ARE YOU ABLE TO CARRY OUT YOUR EVERYDAY PHYSICAL ACTIVITIES SUCH AS WALKING, CLIMBING STAIRS, CARRYING GROCERIES, OR MOVING A CHAIR [ON A SCALE OF 1 (NOT AT ALL) TO 5 (COMPLETELY)]?: COMPLETELY
IN GENERAL, WOULD YOU SAY YOUR QUALITY OF LIFE IS...[ON A SCALE OF 1 (POOR) TO 5 (EXCELLENT)]: VERY GOOD
IN GENERAL, PLEASE RATE HOW WELL YOU CARRY OUT YOUR USUAL SOCIAL ACTIVITIES (INCLUDES ACTIVITIES AT HOME, AT WORK, AND IN YOUR COMMUNITY, AND RESPONSIBILITIES AS A PARENT, CHILD, SPOUSE, EMPLOYEE, FRIEND, ETC) [ON A SCALE OF 1 (POOR) TO 5 (EXCELLENT)]?: VERY GOOD
IN THE PAST 7 DAYS, HOW WOULD YOU RATE YOUR FATIGUE ON AVERAGE [ON A SCALE FROM 1 (NONE) TO 5 (VERY SEVERE)]?: MILD
IN GENERAL, HOW WOULD YOU RATE YOUR SATISFACTION WITH YOUR SOCIAL ACTIVITIES AND RELATIONSHIPS [ON A SCALE OF 1 (POOR) TO 5 (EXCELLENT)]?: VERY GOOD
TO WHAT EXTENT ARE YOU ABLE TO CARRY OUT YOUR EVERYDAY PHYSICAL ACTIVITIES SUCH AS WALKING, CLIMBING STAIRS, CARRYING GROCERIES, OR MOVING A CHAIR [ON A SCALE OF 1 (NOT AT ALL) TO 5 (COMPLETELY)]?: COMPLETELY
WHO IS THE PERSON COMPLETING THE PROMIS V1.1 SURVEY?: SELF

## 2025-07-07 ENCOUNTER — OFFICE VISIT (OUTPATIENT)
Dept: ORTHOPEDIC SURGERY | Age: 75
End: 2025-07-07
Payer: MEDICARE

## 2025-07-07 VITALS — WEIGHT: 152.8 LBS | HEART RATE: 60 BPM | BODY MASS INDEX: 26.23 KG/M2 | OXYGEN SATURATION: 99 %

## 2025-07-07 DIAGNOSIS — M17.12 ARTHRITIS OF KNEE, LEFT: Primary | ICD-10-CM

## 2025-07-07 PROCEDURE — 3017F COLORECTAL CA SCREEN DOC REV: CPT | Performed by: STUDENT IN AN ORGANIZED HEALTH CARE EDUCATION/TRAINING PROGRAM

## 2025-07-07 PROCEDURE — 20610 DRAIN/INJ JOINT/BURSA W/O US: CPT | Performed by: STUDENT IN AN ORGANIZED HEALTH CARE EDUCATION/TRAINING PROGRAM

## 2025-07-07 PROCEDURE — 1159F MED LIST DOCD IN RCRD: CPT | Performed by: STUDENT IN AN ORGANIZED HEALTH CARE EDUCATION/TRAINING PROGRAM

## 2025-07-07 PROCEDURE — G8400 PT W/DXA NO RESULTS DOC: HCPCS | Performed by: STUDENT IN AN ORGANIZED HEALTH CARE EDUCATION/TRAINING PROGRAM

## 2025-07-07 PROCEDURE — 1125F AMNT PAIN NOTED PAIN PRSNT: CPT | Performed by: STUDENT IN AN ORGANIZED HEALTH CARE EDUCATION/TRAINING PROGRAM

## 2025-07-07 PROCEDURE — 1090F PRES/ABSN URINE INCON ASSESS: CPT | Performed by: STUDENT IN AN ORGANIZED HEALTH CARE EDUCATION/TRAINING PROGRAM

## 2025-07-07 PROCEDURE — G8417 CALC BMI ABV UP PARAM F/U: HCPCS | Performed by: STUDENT IN AN ORGANIZED HEALTH CARE EDUCATION/TRAINING PROGRAM

## 2025-07-07 PROCEDURE — 1036F TOBACCO NON-USER: CPT | Performed by: STUDENT IN AN ORGANIZED HEALTH CARE EDUCATION/TRAINING PROGRAM

## 2025-07-07 PROCEDURE — 1123F ACP DISCUSS/DSCN MKR DOCD: CPT | Performed by: STUDENT IN AN ORGANIZED HEALTH CARE EDUCATION/TRAINING PROGRAM

## 2025-07-07 PROCEDURE — G8427 DOCREV CUR MEDS BY ELIG CLIN: HCPCS | Performed by: STUDENT IN AN ORGANIZED HEALTH CARE EDUCATION/TRAINING PROGRAM

## 2025-07-07 PROCEDURE — 99213 OFFICE O/P EST LOW 20 MIN: CPT | Performed by: STUDENT IN AN ORGANIZED HEALTH CARE EDUCATION/TRAINING PROGRAM

## 2025-07-07 RX ORDER — TRIAMCINOLONE ACETONIDE 40 MG/ML
40 INJECTION, SUSPENSION INTRA-ARTICULAR; INTRAMUSCULAR ONCE
Status: COMPLETED | OUTPATIENT
Start: 2025-07-07 | End: 2025-07-07

## 2025-07-07 RX ADMIN — TRIAMCINOLONE ACETONIDE 40 MG: 40 INJECTION, SUSPENSION INTRA-ARTICULAR; INTRAMUSCULAR at 10:02

## 2025-07-07 ASSESSMENT — ENCOUNTER SYMPTOMS
VOMITING: 0
VOICE CHANGE: 0
BACK PAIN: 0
WHEEZING: 0
COLOR CHANGE: 0
SHORTNESS OF BREATH: 0
COUGH: 0
SORE THROAT: 0
NAUSEA: 0

## 2025-07-07 NOTE — PROGRESS NOTES
Date of surgery: 4-3-2025     Procedure:   Left knee arthroscopy, partial medial and lateral meniscectomies, chondroplasty       History:  Patient is here in follow up regarding their 3-month postop appoint for above procedure    Patient is doing well. They have slowly improving and now more intermittent 2/10 pain. They deny chest pain, SOB, calf pain,fever,wound drainage.  No other issues.  Patient denies any constitutional symptoms.    Patient states they have been compliant with restrictions.  Patient ambulates without any assistive device and without limp      Review of Systems   Constitutional:  Positive for activity change. Negative for fatigue and fever.   HENT:  Negative for sneezing, sore throat and voice change.    Respiratory:  Negative for cough, shortness of breath and wheezing.    Cardiovascular:  Negative for leg swelling.   Gastrointestinal:  Negative for nausea and vomiting.   Musculoskeletal:  Positive for arthralgias. Negative for back pain, gait problem, joint swelling, myalgias, neck pain and neck stiffness.   Skin:  Negative for color change, rash and wound.   Neurological:  Negative for weakness and numbness.   Psychiatric/Behavioral:  Negative for behavioral problems, confusion and self-injury.          Physical:   Patient demonstrates appropriate mood and affect.     Left lower extremity exam:   The incisions are well-healed and are clean, dry, intact, and nontender with no erythema. They have no edema, the Leg compartments are soft . There are No cords or calf tenderness.  No significant calf/ankle edema. They are neurovascularly intact distally.     Range of motion of the left knee demonstrates full active and passive range of motion without pain     No areas of tenderness to palpation    Negative Amara's    Imaging:   No new orthopedic imaging    Left Lateral Infrapatetellar Injection Procedure Note   Pre-operative Diagnosis: Left knee osteoarthritis   Post-operative Diagnosis: same

## 2025-07-07 NOTE — PROGRESS NOTES
Pt comes in today for a 12 wk FU s/p LT knee arthroscopy on 4/3/25. Last seen in our office on 5/19/25. Pain is 2/10 today. She reports increased pain when getting into her car and uneven surfaces. She also reports random sharp pain with walking/ pivoting. She denies any new falls or injures.

## 2025-07-07 NOTE — PATIENT INSTRUCTIONS
POST-INJECTION INSTRUCTIONS: Ice  for 15-20 minutes as needed to relieve any injection site related pain. Decrease activity for the next 24 to 48 hours. May use prescription or OTC pain relievers as needed. If at any time you are concerned you may contact the office for further instructions or care.   Continue exercises for strengthening.  Follow up in 3 months.

## 2025-07-31 SDOH — HEALTH STABILITY: PHYSICAL HEALTH: ON AVERAGE, HOW MANY MINUTES DO YOU ENGAGE IN EXERCISE AT THIS LEVEL?: 20 MIN

## 2025-07-31 SDOH — HEALTH STABILITY: PHYSICAL HEALTH: ON AVERAGE, HOW MANY DAYS PER WEEK DO YOU ENGAGE IN MODERATE TO STRENUOUS EXERCISE (LIKE A BRISK WALK)?: 2 DAYS

## 2025-07-31 ASSESSMENT — LIFESTYLE VARIABLES
HOW OFTEN DO YOU HAVE A DRINK CONTAINING ALCOHOL: NEVER
HOW MANY STANDARD DRINKS CONTAINING ALCOHOL DO YOU HAVE ON A TYPICAL DAY: PATIENT DOES NOT DRINK
HOW OFTEN DO YOU HAVE A DRINK CONTAINING ALCOHOL: 1
HOW OFTEN DO YOU HAVE SIX OR MORE DRINKS ON ONE OCCASION: 1
HOW MANY STANDARD DRINKS CONTAINING ALCOHOL DO YOU HAVE ON A TYPICAL DAY: 0

## 2025-07-31 ASSESSMENT — PATIENT HEALTH QUESTIONNAIRE - PHQ9
1. LITTLE INTEREST OR PLEASURE IN DOING THINGS: NOT AT ALL
SUM OF ALL RESPONSES TO PHQ QUESTIONS 1-9: 0
SUM OF ALL RESPONSES TO PHQ QUESTIONS 1-9: 0
2. FEELING DOWN, DEPRESSED OR HOPELESS: NOT AT ALL
SUM OF ALL RESPONSES TO PHQ QUESTIONS 1-9: 0
SUM OF ALL RESPONSES TO PHQ QUESTIONS 1-9: 0

## 2025-08-14 ENCOUNTER — OFFICE VISIT (OUTPATIENT)
Dept: FAMILY MEDICINE CLINIC | Age: 75
End: 2025-08-14
Payer: MEDICARE

## 2025-08-14 VITALS
DIASTOLIC BLOOD PRESSURE: 66 MMHG | WEIGHT: 150 LBS | BODY MASS INDEX: 25.61 KG/M2 | HEART RATE: 69 BPM | HEIGHT: 64 IN | OXYGEN SATURATION: 99 % | SYSTOLIC BLOOD PRESSURE: 122 MMHG

## 2025-08-14 DIAGNOSIS — Z00.00 MEDICARE ANNUAL WELLNESS VISIT, SUBSEQUENT: Primary | ICD-10-CM

## 2025-08-14 PROCEDURE — 3074F SYST BP LT 130 MM HG: CPT | Performed by: STUDENT IN AN ORGANIZED HEALTH CARE EDUCATION/TRAINING PROGRAM

## 2025-08-14 PROCEDURE — 1123F ACP DISCUSS/DSCN MKR DOCD: CPT | Performed by: STUDENT IN AN ORGANIZED HEALTH CARE EDUCATION/TRAINING PROGRAM

## 2025-08-14 PROCEDURE — G0439 PPPS, SUBSEQ VISIT: HCPCS | Performed by: STUDENT IN AN ORGANIZED HEALTH CARE EDUCATION/TRAINING PROGRAM

## 2025-08-14 PROCEDURE — 3078F DIAST BP <80 MM HG: CPT | Performed by: STUDENT IN AN ORGANIZED HEALTH CARE EDUCATION/TRAINING PROGRAM

## 2025-08-14 PROCEDURE — 1159F MED LIST DOCD IN RCRD: CPT | Performed by: STUDENT IN AN ORGANIZED HEALTH CARE EDUCATION/TRAINING PROGRAM

## 2025-08-14 ASSESSMENT — PATIENT HEALTH QUESTIONNAIRE - PHQ9
SUM OF ALL RESPONSES TO PHQ QUESTIONS 1-9: 0
SUM OF ALL RESPONSES TO PHQ QUESTIONS 1-9: 0
2. FEELING DOWN, DEPRESSED OR HOPELESS: NOT AT ALL
1. LITTLE INTEREST OR PLEASURE IN DOING THINGS: NOT AT ALL
SUM OF ALL RESPONSES TO PHQ QUESTIONS 1-9: 0
SUM OF ALL RESPONSES TO PHQ QUESTIONS 1-9: 0

## 2025-08-14 ASSESSMENT — LIFESTYLE VARIABLES
HOW OFTEN DO YOU HAVE A DRINK CONTAINING ALCOHOL: NEVER
HOW MANY STANDARD DRINKS CONTAINING ALCOHOL DO YOU HAVE ON A TYPICAL DAY: PATIENT DOES NOT DRINK

## (undated) DEVICE — PACK,BASIC,IX: Brand: MEDLINE

## (undated) DEVICE — SOLUTION IV IRRIG POUR BRL 0.9% SODIUM CHL 2F7124

## (undated) DEVICE — GLOVE ORANGE PI 7 1/2   MSG9075

## (undated) DEVICE — GLOVE ORANGE PI 8   MSG9080

## (undated) DEVICE — 3M™ STERI-DRAPE™ INSTRUMENT POUCH 1018: Brand: STERI-DRAPE™

## (undated) DEVICE — COTTON UNDERCAST PADDING,CRIMPED FINISH: Brand: WEBRIL

## (undated) DEVICE — SHOULDER PK

## (undated) DEVICE — HOOD, PEEL-AWAY: Brand: FLYTE

## (undated) DEVICE — STRYKER PERFORMANCE SERIES SAGITTAL BLADE: Brand: STRYKER PERFORMANCE SERIES

## (undated) DEVICE — SUTURE PERMAHAND SZ 2-0 L17X18IN NONABSORBABLE BLK SILK SA65H

## (undated) DEVICE — PROTECTOR EYE PT SELF ADH NS OPT GRD LF

## (undated) DEVICE — SOLUTION IV IRRIG WATER 1000ML POUR BRL 2F7114

## (undated) DEVICE — SYSTEM SKIN CLSR 22CM DERMBND PRINEO

## (undated) DEVICE — TUBING, SUCTION, 9/32" X 10', STRAIGHT: Brand: MEDLINE

## (undated) DEVICE — SPONGE GZ W4XL8IN COT WVN 12 PLY

## (undated) DEVICE — DRESSING PETRO W3XL3IN OIL EMUL N ADH GZ KNIT IMPREG CELOS

## (undated) DEVICE — BANDAGE COMPR ELASTIC 9 FTX4 IN STRL ESMARCH LF

## (undated) DEVICE — SUTURE VCRL SZ 0 L27IN ABSRB UD L36MM CT-1 1/2 CIR J260H

## (undated) DEVICE — DRAPE,U/ SHT,SPLIT,PLAS,STERIL: Brand: MEDLINE

## (undated) DEVICE — SYRINGE IRRIG 60ML SFT PLIABLE BLB EZ TO GRP 1 HND USE W/

## (undated) DEVICE — Z INACTIVE USE 2660664 SOLUTION IRRIG 3000ML 0.9% SOD CHL USP UROMATIC PLAS CONT

## (undated) DEVICE — SUTURE ETHLN SZ 3-0 L30IN NONABSORBABLE BLK FS-1 L24MM 3/8 669H

## (undated) DEVICE — TOWEL,OR,DSP,ST,BLUE,STD,6/PK,12PK/CS: Brand: MEDLINE

## (undated) DEVICE — SUTURE ABSORBABLE BRAIDED 2-0 CT-1 27 IN UD VICRYL J259H

## (undated) DEVICE — SUTURE ABSORBABLE 3-0 PS-1 18 IN UD MONOCRYL + STRATAFIX SXMP1B102

## (undated) DEVICE — PAD THER SM SHLDR WRP ON REG HOSE

## (undated) DEVICE — SUTURE FIBERWIRE SZ 2 W/ TAPERED NEEDLE BLUE L38IN NONABSORB BLU L26.5MM 1/2 CIRCLE AR7200

## (undated) DEVICE — GOWN,SIRUS,POLYRNF,BRTHSLV,XLN/XL,20/CS: Brand: MEDLINE

## (undated) DEVICE — HEWSON SUTURE RETRIEVER: Brand: HEWSON SUTURE RETRIEVER

## (undated) DEVICE — BANDAGE,SELF ADHRNT,COFLEX,4"X5YD,STRL: Brand: COLABEL

## (undated) DEVICE — ELECTRODE ES AD CRDLSS PT RET REM POLYHESIVE

## (undated) DEVICE — COUNTER NDL 30 COUNT FOAM STRP SGL MAG

## (undated) DEVICE — PENCIL ES CRD L10FT HND SWCHING ROCK SWCH W/ EDGE COAT BLDE

## (undated) DEVICE — SUTURE ETHBND EXCEL SZ 5 L30IN NONABSORBABLE GRN L40MM V-37 MB66G

## (undated) DEVICE — YANKAUER,FLEXIBLE HANDLE,REGLR CAPACITY: Brand: MEDLINE INDUSTRIES, INC.

## (undated) DEVICE — SUTURE N ABSRB MONOFILAMENT 2-0 38 IN 39 MM BLU FORC FIBER 3910900022

## (undated) DEVICE — IMMOBILIZER SHLDR SWTH UNIV DEV BLK P.A.D. III

## (undated) DEVICE — GUIDEPIN ORTH 3X75 MM SS SIMPLICITI

## (undated) DEVICE — SPONGE LAP W18XL18IN WHT COT 4 PLY FLD STRUNG RADPQ DISP ST 2 PER PACK

## (undated) DEVICE — PAD,ABDOMINAL,5"X9",ST,LF,25/BX: Brand: MEDLINE INDUSTRIES, INC.

## (undated) DEVICE — GLOVE SURG SZ 8 L12IN THK75MIL DK GRN LTX FREE

## (undated) DEVICE — SOLUTION SURG PREP PVP IOD 10% 8 OZ BTL SCRB CARE

## (undated) DEVICE — DRAPE SURGICAL HAND PROX AURORA

## (undated) DEVICE — INTENDED FOR TISSUE SEPARATION, AND OTHER PROCEDURES THAT REQUIRE A SHARP SURGICAL BLADE TO PUNCTURE OR CUT.: Brand: BARD-PARKER ® STAINLESS STEEL BLADES

## (undated) DEVICE — MARKER SURG SKIN UTIL REGULAR/FINE 2 TIP W/ RUL AND 9 LBL

## (undated) DEVICE — CLASSIC CLD THER SYS